# Patient Record
Sex: MALE | Race: WHITE | Employment: OTHER | URBAN - METROPOLITAN AREA
[De-identification: names, ages, dates, MRNs, and addresses within clinical notes are randomized per-mention and may not be internally consistent; named-entity substitution may affect disease eponyms.]

---

## 2019-01-18 ENCOUNTER — HOSPITAL ENCOUNTER (EMERGENCY)
Age: 54
Discharge: HOME | End: 2019-01-18
Attending: EMERGENCY MEDICINE
Payer: MEDICARE

## 2019-01-18 ENCOUNTER — APPOINTMENT (OUTPATIENT)
Dept: CT IMAGING | Age: 54
End: 2019-01-18
Payer: MEDICARE

## 2019-01-18 ENCOUNTER — APPOINTMENT (OUTPATIENT)
Dept: GENERAL RADIOLOGY | Age: 54
End: 2019-01-18
Payer: MEDICARE

## 2019-01-18 DIAGNOSIS — S83.92XA SPRAIN OF LEFT KNEE, UNSPECIFIED LIGAMENT, INITIAL ENCOUNTER: ICD-10-CM

## 2019-01-18 DIAGNOSIS — S00.83XA CONTUSION OF FACE, INITIAL ENCOUNTER: ICD-10-CM

## 2019-01-18 DIAGNOSIS — J01.00 ACUTE MAXILLARY SINUSITIS, RECURRENCE NOT SPECIFIED: ICD-10-CM

## 2019-01-18 DIAGNOSIS — S00.81XA ABRASION OF FACE, INITIAL ENCOUNTER: ICD-10-CM

## 2019-01-18 DIAGNOSIS — S09.90XA CLOSED HEAD INJURY, INITIAL ENCOUNTER: ICD-10-CM
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
## 2019-01-18 PROCEDURE — 70486 CT MAXILLOFACIAL W/O DYE: CPT

## 2019-01-18 PROCEDURE — 70450 CT HEAD/BRAIN W/O DYE: CPT

## 2019-01-18 PROCEDURE — 6370000000 HC RX 637 (ALT 250 FOR IP): Performed by: EMERGENCY MEDICINE

## 2019-01-18 PROCEDURE — 73560 X-RAY EXAM OF KNEE 1 OR 2: CPT

## 2019-01-18 PROCEDURE — 72125 CT NECK SPINE W/O DYE: CPT

## 2019-01-18 PROCEDURE — 99284 EMERGENCY DEPT VISIT MOD MDM: CPT

## 2019-01-18 RX ORDER — ACETAMINOPHEN 325 MG/1
650 TABLET ORAL EVERY 6 HOURS PRN
Qty: 30 TABLET | Refills: 0 | Status: SHIPPED | OUTPATIENT
Start: 2019-01-18 | End: 2022-06-20

## 2019-01-18 RX ORDER — DIAPER,BRIEF,INFANT-TODD,DISP
EACH MISCELLANEOUS ONCE
Status: COMPLETED | OUTPATIENT
Start: 2019-01-18 | End: 2019-01-18

## 2019-01-18 RX ORDER — LEVOFLOXACIN 500 MG/1
500 TABLET, FILM COATED ORAL DAILY
Qty: 10 TABLET | Refills: 0 | Status: SHIPPED | OUTPATIENT
Start: 2019-01-18 | End: 2019-01-28

## 2019-01-18 RX ORDER — GINSENG 100 MG
CAPSULE ORAL
Qty: 1 TUBE | Refills: 3 | Status: SHIPPED | OUTPATIENT
Start: 2019-01-18 | End: 2019-01-28

## 2019-01-18 RX ORDER — ACETAMINOPHEN 500 MG
1000 TABLET ORAL ONCE
Status: COMPLETED | OUTPATIENT
Start: 2019-01-18 | End: 2019-01-18

## 2019-01-18 RX ORDER — LEVOFLOXACIN 500 MG/1
500 TABLET, FILM COATED ORAL DAILY
Status: DISCONTINUED | OUTPATIENT
Start: 2019-01-18 | End: 2019-01-18 | Stop reason: HOSPADM

## 2019-01-18 RX ADMIN — ACETAMINOPHEN 1000 MG: 500 TABLET ORAL at 20:26

## 2019-01-18 RX ADMIN — LEVOFLOXACIN 500 MG: 500 TABLET, FILM COATED ORAL at 20:26

## 2019-01-18 RX ADMIN — BACITRACIN ZINC 1 G: 500 OINTMENT TOPICAL at 20:30

## 2019-01-18 ASSESSMENT — PAIN SCALES - GENERAL
PAINLEVEL_OUTOF10: 4
PAINLEVEL_OUTOF10: 7

## 2019-01-18 ASSESSMENT — PAIN DESCRIPTION - LOCATION: LOCATION: KNEE

## 2019-01-18 ASSESSMENT — PAIN DESCRIPTION - PAIN TYPE: TYPE: ACUTE PAIN

## 2019-01-18 ASSESSMENT — PAIN DESCRIPTION - ORIENTATION: ORIENTATION: LEFT

## 2019-01-19 VITALS
TEMPERATURE: 99 F | SYSTOLIC BLOOD PRESSURE: 132 MMHG | DIASTOLIC BLOOD PRESSURE: 87 MMHG | WEIGHT: 150 LBS | RESPIRATION RATE: 16 BRPM | BODY MASS INDEX: 23.54 KG/M2 | HEART RATE: 92 BPM | HEIGHT: 67 IN | OXYGEN SATURATION: 96 %

## 2019-11-30 ENCOUNTER — APPOINTMENT (OUTPATIENT)
Dept: CT IMAGING | Age: 54
DRG: 392 | End: 2019-11-30
Payer: MEDICARE

## 2019-11-30 ENCOUNTER — HOSPITAL ENCOUNTER (INPATIENT)
Age: 54
LOS: 2 days | Discharge: HOME | DRG: 392 | End: 2019-12-03
Attending: EMERGENCY MEDICINE | Admitting: INTERNAL MEDICINE
Payer: MEDICARE

## 2019-11-30 ENCOUNTER — APPOINTMENT (OUTPATIENT)
Dept: GENERAL RADIOLOGY | Age: 54
DRG: 392 | End: 2019-11-30
Payer: MEDICARE

## 2019-11-30 DIAGNOSIS — R11.2 NAUSEA AND VOMITING, INTRACTABILITY OF VOMITING NOT SPECIFIED, UNSPECIFIED VOMITING TYPE: ICD-10-CM

## 2019-11-30 DIAGNOSIS — K92.0 HEMATEMESIS WITH NAUSEA: Primary | ICD-10-CM

## 2019-11-30 DIAGNOSIS — K20.90 ESOPHAGITIS: ICD-10-CM

## 2019-11-30 DIAGNOSIS — E87.20 LACTIC ACIDOSIS: ICD-10-CM

## 2019-11-30 DIAGNOSIS — R10.84 GENERALIZED ABDOMINAL PAIN: ICD-10-CM

## 2019-11-30 LAB
ABO/RH: NORMAL
ALBUMIN SERPL-MCNC: 3.7 GM/DL (ref 3.4–5)
ALP BLD-CCNC: 104 IU/L (ref 40–128)
ALT SERPL-CCNC: 17 U/L (ref 10–40)
ANION GAP SERPL CALCULATED.3IONS-SCNC: 16 MMOL/L (ref 4–16)
ANTIBODY SCREEN: NEGATIVE
APTT: 22.7 SECONDS (ref 25.1–37.1)
AST SERPL-CCNC: 15 IU/L (ref 15–37)
BACTERIA: NEGATIVE /HPF
BASOPHILS ABSOLUTE: 0 K/CU MM
BASOPHILS RELATIVE PERCENT: 0.1 % (ref 0–1)
BILIRUB SERPL-MCNC: 0.8 MG/DL (ref 0–1)
BILIRUBIN URINE: NEGATIVE MG/DL
BLOOD, URINE: ABNORMAL
BUN BLDV-MCNC: 19 MG/DL (ref 6–23)
CALCIUM SERPL-MCNC: 8.4 MG/DL (ref 8.3–10.6)
CHLORIDE BLD-SCNC: 104 MMOL/L (ref 99–110)
CLARITY: CLEAR
CO2: 18 MMOL/L (ref 21–32)
COLOR: YELLOW
CREAT SERPL-MCNC: 0.8 MG/DL (ref 0.9–1.3)
DIFFERENTIAL TYPE: ABNORMAL
EOSINOPHILS ABSOLUTE: 0.1 K/CU MM
EOSINOPHILS RELATIVE PERCENT: 0.5 % (ref 0–3)
GFR AFRICAN AMERICAN: >60 ML/MIN/1.73M2
GFR NON-AFRICAN AMERICAN: >60 ML/MIN/1.73M2
GLUCOSE BLD-MCNC: 138 MG/DL (ref 70–99)
GLUCOSE, URINE: NEGATIVE MG/DL
HCT VFR BLD CALC: 52.8 % (ref 42–52)
HEMOGLOBIN: 16.9 GM/DL (ref 13.5–18)
IMMATURE NEUTROPHIL %: 0.4 % (ref 0–0.43)
INR BLD: 1.08 INDEX
KETONES, URINE: NEGATIVE MG/DL
LACTATE: 2.9 MMOL/L (ref 0.4–2)
LACTATE: ABNORMAL MMOL/L (ref 0.4–2)
LEUKOCYTE ESTERASE, URINE: NEGATIVE
LIPASE: 30 IU/L (ref 13–60)
LYMPHOCYTES ABSOLUTE: 0.6 K/CU MM
LYMPHOCYTES RELATIVE PERCENT: 4.5 % (ref 24–44)
MAGNESIUM: 1.6 MG/DL (ref 1.8–2.4)
MCH RBC QN AUTO: 30.6 PG (ref 27–31)
MCHC RBC AUTO-ENTMCNC: 32 % (ref 32–36)
MCV RBC AUTO: 95.7 FL (ref 78–100)
MONOCYTES ABSOLUTE: 1.3 K/CU MM
MONOCYTES RELATIVE PERCENT: 9.5 % (ref 0–4)
MUCUS: ABNORMAL HPF
NITRITE URINE, QUANTITATIVE: NEGATIVE
NUCLEATED RBC %: 0 %
PDW BLD-RTO: 12.3 % (ref 11.7–14.9)
PH, URINE: 5 (ref 5–8)
PLATELET # BLD: 235 K/CU MM (ref 140–440)
PMV BLD AUTO: 9.9 FL (ref 7.5–11.1)
POTASSIUM SERPL-SCNC: 3.4 MMOL/L (ref 3.5–5.1)
PRO-BNP: 17.54 PG/ML
PROTEIN UA: NEGATIVE MG/DL
PROTHROMBIN TIME: 12.3 SECONDS (ref 11.7–14.5)
RBC # BLD: 5.52 M/CU MM (ref 4.6–6.2)
RBC URINE: 2 /HPF (ref 0–3)
SEGMENTED NEUTROPHILS ABSOLUTE COUNT: 11.6 K/CU MM
SEGMENTED NEUTROPHILS RELATIVE PERCENT: 85 % (ref 36–66)
SODIUM BLD-SCNC: 138 MMOL/L (ref 135–145)
SPECIFIC GRAVITY UA: 1.03 (ref 1–1.03)
TOTAL IMMATURE NEUTOROPHIL: 0.06 K/CU MM
TOTAL NUCLEATED RBC: 0 K/CU MM
TOTAL PROTEIN: 7 GM/DL (ref 6.4–8.2)
TRICHOMONAS: ABNORMAL /HPF
TROPONIN T: <0.01 NG/ML
UROBILINOGEN, URINE: NORMAL MG/DL (ref 0.2–1)
WBC # BLD: 13.7 K/CU MM (ref 4–10.5)
WBC UA: 2 /HPF (ref 0–2)

## 2019-11-30 PROCEDURE — 96375 TX/PRO/DX INJ NEW DRUG ADDON: CPT

## 2019-11-30 PROCEDURE — 99285 EMERGENCY DEPT VISIT HI MDM: CPT

## 2019-11-30 PROCEDURE — 83880 ASSAY OF NATRIURETIC PEPTIDE: CPT

## 2019-11-30 PROCEDURE — 6360000004 HC RX CONTRAST MEDICATION: Performed by: PHYSICIAN ASSISTANT

## 2019-11-30 PROCEDURE — 74177 CT ABD & PELVIS W/CONTRAST: CPT

## 2019-11-30 PROCEDURE — 80053 COMPREHEN METABOLIC PANEL: CPT

## 2019-11-30 PROCEDURE — 93005 ELECTROCARDIOGRAM TRACING: CPT | Performed by: PHYSICIAN ASSISTANT

## 2019-11-30 PROCEDURE — 2580000003 HC RX 258: Performed by: PHYSICIAN ASSISTANT

## 2019-11-30 PROCEDURE — 71046 X-RAY EXAM CHEST 2 VIEWS: CPT

## 2019-11-30 PROCEDURE — 84484 ASSAY OF TROPONIN QUANT: CPT

## 2019-11-30 PROCEDURE — 86850 RBC ANTIBODY SCREEN: CPT

## 2019-11-30 PROCEDURE — 83605 ASSAY OF LACTIC ACID: CPT

## 2019-11-30 PROCEDURE — 85730 THROMBOPLASTIN TIME PARTIAL: CPT

## 2019-11-30 PROCEDURE — 83690 ASSAY OF LIPASE: CPT

## 2019-11-30 PROCEDURE — 36415 COLL VENOUS BLD VENIPUNCTURE: CPT

## 2019-11-30 PROCEDURE — 96374 THER/PROPH/DIAG INJ IV PUSH: CPT

## 2019-11-30 PROCEDURE — 85025 COMPLETE CBC W/AUTO DIFF WBC: CPT

## 2019-11-30 PROCEDURE — 83735 ASSAY OF MAGNESIUM: CPT

## 2019-11-30 PROCEDURE — 86900 BLOOD TYPING SEROLOGIC ABO: CPT

## 2019-11-30 PROCEDURE — 86901 BLOOD TYPING SEROLOGIC RH(D): CPT

## 2019-11-30 PROCEDURE — 2500000003 HC RX 250 WO HCPCS: Performed by: PHYSICIAN ASSISTANT

## 2019-11-30 PROCEDURE — 85610 PROTHROMBIN TIME: CPT

## 2019-11-30 PROCEDURE — 6360000002 HC RX W HCPCS: Performed by: PHYSICIAN ASSISTANT

## 2019-11-30 PROCEDURE — 81001 URINALYSIS AUTO W/SCOPE: CPT

## 2019-11-30 RX ORDER — 0.9 % SODIUM CHLORIDE 0.9 %
250 INTRAVENOUS SOLUTION INTRAVENOUS ONCE
Status: COMPLETED | OUTPATIENT
Start: 2019-11-30 | End: 2019-11-30

## 2019-11-30 RX ORDER — 0.9 % SODIUM CHLORIDE 0.9 %
1000 INTRAVENOUS SOLUTION INTRAVENOUS ONCE
Status: COMPLETED | OUTPATIENT
Start: 2019-11-30 | End: 2019-12-01

## 2019-11-30 RX ORDER — SODIUM CHLORIDE 0.9 % (FLUSH) 0.9 %
10 SYRINGE (ML) INJECTION 2 TIMES DAILY
Status: DISCONTINUED | OUTPATIENT
Start: 2019-11-30 | End: 2019-12-03 | Stop reason: HOSPADM

## 2019-11-30 RX ORDER — ONDANSETRON 2 MG/ML
4 INJECTION INTRAMUSCULAR; INTRAVENOUS ONCE
Status: COMPLETED | OUTPATIENT
Start: 2019-11-30 | End: 2019-11-30

## 2019-11-30 RX ADMIN — SODIUM CHLORIDE 250 ML: 9 INJECTION, SOLUTION INTRAVENOUS at 18:13

## 2019-11-30 RX ADMIN — FAMOTIDINE 20 MG: 10 INJECTION, SOLUTION INTRAVENOUS at 18:13

## 2019-11-30 RX ADMIN — IOPAMIDOL 80 ML: 755 INJECTION, SOLUTION INTRAVENOUS at 20:52

## 2019-11-30 RX ADMIN — Medication 10 ML: at 20:52

## 2019-11-30 RX ADMIN — ONDANSETRON 4 MG: 2 INJECTION INTRAMUSCULAR; INTRAVENOUS at 18:13

## 2019-11-30 RX ADMIN — SODIUM CHLORIDE 1000 ML: 9 INJECTION, SOLUTION INTRAVENOUS at 22:40

## 2019-11-30 ASSESSMENT — PAIN SCALES - GENERAL: PAINLEVEL_OUTOF10: 10

## 2019-11-30 ASSESSMENT — PAIN DESCRIPTION - PAIN TYPE: TYPE: ACUTE PAIN

## 2019-11-30 ASSESSMENT — PAIN DESCRIPTION - LOCATION: LOCATION: ABDOMEN

## 2019-12-01 PROBLEM — K92.2 ACUTE UPPER GI BLEED: Status: ACTIVE | Noted: 2019-12-01

## 2019-12-01 LAB
HCT VFR BLD CALC: 43.5 % (ref 42–52)
HEMOCCULT SP1 STL QL: POSITIVE
HEMOGLOBIN: 14.4 GM/DL (ref 13.5–18)

## 2019-12-01 PROCEDURE — 82705 FATS/LIPIDS FECES QUAL: CPT

## 2019-12-01 PROCEDURE — 87507 IADNA-DNA/RNA PROBE TQ 12-25: CPT

## 2019-12-01 PROCEDURE — 85014 HEMATOCRIT: CPT

## 2019-12-01 PROCEDURE — 87046 STOOL CULTR AEROBIC BACT EA: CPT

## 2019-12-01 PROCEDURE — 87045 FECES CULTURE AEROBIC BACT: CPT

## 2019-12-01 PROCEDURE — 87425 ROTAVIRUS AG IA: CPT

## 2019-12-01 PROCEDURE — 85018 HEMOGLOBIN: CPT

## 2019-12-01 PROCEDURE — 6360000002 HC RX W HCPCS: Performed by: INTERNAL MEDICINE

## 2019-12-01 PROCEDURE — C9113 INJ PANTOPRAZOLE SODIUM, VIA: HCPCS | Performed by: INTERNAL MEDICINE

## 2019-12-01 PROCEDURE — 36415 COLL VENOUS BLD VENIPUNCTURE: CPT

## 2019-12-01 PROCEDURE — 87329 GIARDIA AG IA: CPT

## 2019-12-01 PROCEDURE — 1200000000 HC SEMI PRIVATE

## 2019-12-01 PROCEDURE — G0328 FECAL BLOOD SCRN IMMUNOASSAY: HCPCS

## 2019-12-01 PROCEDURE — 93010 ELECTROCARDIOGRAM REPORT: CPT | Performed by: INTERNAL MEDICINE

## 2019-12-01 PROCEDURE — 2580000003 HC RX 258: Performed by: INTERNAL MEDICINE

## 2019-12-01 RX ORDER — SODIUM CHLORIDE 9 MG/ML
INJECTION, SOLUTION INTRAVENOUS CONTINUOUS
Status: DISCONTINUED | OUTPATIENT
Start: 2019-12-01 | End: 2019-12-01

## 2019-12-01 RX ORDER — MAGNESIUM SULFATE 4 G/50ML
4 INJECTION INTRAVENOUS ONCE
Status: COMPLETED | OUTPATIENT
Start: 2019-12-01 | End: 2019-12-01

## 2019-12-01 RX ORDER — PANTOPRAZOLE SODIUM 40 MG/10ML
40 INJECTION, POWDER, LYOPHILIZED, FOR SOLUTION INTRAVENOUS EVERY 12 HOURS
Status: DISCONTINUED | OUTPATIENT
Start: 2019-12-01 | End: 2019-12-03

## 2019-12-01 RX ORDER — SODIUM CHLORIDE 0.9 % (FLUSH) 0.9 %
10 SYRINGE (ML) INJECTION PRN
Status: DISCONTINUED | OUTPATIENT
Start: 2019-12-01 | End: 2019-12-03 | Stop reason: HOSPADM

## 2019-12-01 RX ORDER — LEVOTHYROXINE SODIUM 0.03 MG/1
25 TABLET ORAL DAILY
COMMUNITY

## 2019-12-01 RX ORDER — AMLODIPINE BESYLATE 10 MG/1
10 TABLET ORAL EVERY MORNING
Status: DISCONTINUED | OUTPATIENT
Start: 2019-12-02 | End: 2019-12-03 | Stop reason: HOSPADM

## 2019-12-01 RX ORDER — PANTOPRAZOLE SODIUM 40 MG/10ML
40 INJECTION, POWDER, LYOPHILIZED, FOR SOLUTION INTRAVENOUS ONCE
Status: DISCONTINUED | OUTPATIENT
Start: 2019-12-01 | End: 2019-12-01

## 2019-12-01 RX ORDER — PAROXETINE HYDROCHLORIDE 20 MG/1
40 TABLET, FILM COATED ORAL EVERY MORNING
Status: DISCONTINUED | OUTPATIENT
Start: 2019-12-02 | End: 2019-12-03 | Stop reason: HOSPADM

## 2019-12-01 RX ORDER — SODIUM CHLORIDE AND POTASSIUM CHLORIDE .9; .15 G/100ML; G/100ML
SOLUTION INTRAVENOUS CONTINUOUS
Status: DISCONTINUED | OUTPATIENT
Start: 2019-12-01 | End: 2019-12-03 | Stop reason: HOSPADM

## 2019-12-01 RX ORDER — ACETAMINOPHEN 325 MG/1
650 TABLET ORAL EVERY 4 HOURS PRN
Status: DISCONTINUED | OUTPATIENT
Start: 2019-12-01 | End: 2019-12-03 | Stop reason: HOSPADM

## 2019-12-01 RX ORDER — SODIUM CHLORIDE 9 MG/ML
10 INJECTION INTRAVENOUS EVERY 12 HOURS
Status: DISCONTINUED | OUTPATIENT
Start: 2019-12-01 | End: 2019-12-03 | Stop reason: HOSPADM

## 2019-12-01 RX ORDER — SODIUM CHLORIDE 0.9 % (FLUSH) 0.9 %
10 SYRINGE (ML) INJECTION EVERY 12 HOURS SCHEDULED
Status: DISCONTINUED | OUTPATIENT
Start: 2019-12-01 | End: 2019-12-03 | Stop reason: HOSPADM

## 2019-12-01 RX ORDER — ASPIRIN 81 MG/1
81 TABLET, CHEWABLE ORAL DAILY
Status: DISCONTINUED | OUTPATIENT
Start: 2019-12-01 | End: 2019-12-03 | Stop reason: HOSPADM

## 2019-12-01 RX ORDER — ONDANSETRON 2 MG/ML
4 INJECTION INTRAMUSCULAR; INTRAVENOUS EVERY 6 HOURS PRN
Status: DISCONTINUED | OUTPATIENT
Start: 2019-12-01 | End: 2019-12-03 | Stop reason: HOSPADM

## 2019-12-01 RX ADMIN — PANTOPRAZOLE SODIUM 40 MG: 40 INJECTION, POWDER, FOR SOLUTION INTRAVENOUS at 09:07

## 2019-12-01 RX ADMIN — MAGNESIUM SULFATE HEPTAHYDRATE 4 G: 80 INJECTION, SOLUTION INTRAVENOUS at 09:09

## 2019-12-01 RX ADMIN — POTASSIUM CHLORIDE AND SODIUM CHLORIDE: 900; 150 INJECTION, SOLUTION INTRAVENOUS at 09:19

## 2019-12-01 RX ADMIN — PANTOPRAZOLE SODIUM 40 MG: 40 INJECTION, POWDER, FOR SOLUTION INTRAVENOUS at 20:22

## 2019-12-01 RX ADMIN — SODIUM CHLORIDE: 9 INJECTION, SOLUTION INTRAVENOUS at 06:05

## 2019-12-01 RX ADMIN — Medication 10 ML: at 09:10

## 2019-12-01 RX ADMIN — SODIUM CHLORIDE: 9 INJECTION, SOLUTION INTRAVENOUS at 01:24

## 2019-12-01 RX ADMIN — Medication 10 ML: at 13:24

## 2019-12-01 RX ADMIN — LEVETIRACETAM 500 MG: 100 INJECTION, SOLUTION, CONCENTRATE INTRAVENOUS at 20:22

## 2019-12-01 RX ADMIN — ENOXAPARIN SODIUM 30 MG: 30 INJECTION SUBCUTANEOUS at 13:40

## 2019-12-01 RX ADMIN — Medication 10 ML: at 20:22

## 2019-12-01 RX ADMIN — LEVETIRACETAM 500 MG: 100 INJECTION, SOLUTION, CONCENTRATE INTRAVENOUS at 13:25

## 2019-12-01 RX ADMIN — ONDANSETRON 4 MG: 2 INJECTION INTRAMUSCULAR; INTRAVENOUS at 06:06

## 2019-12-01 ASSESSMENT — PAIN SCALES - GENERAL
PAINLEVEL_OUTOF10: 5
PAINLEVEL_OUTOF10: 0
PAINLEVEL_OUTOF10: 5
PAINLEVEL_OUTOF10: 5
PAINLEVEL_OUTOF10: 2
PAINLEVEL_OUTOF10: 5
PAINLEVEL_OUTOF10: 5
PAINLEVEL_OUTOF10: 0
PAINLEVEL_OUTOF10: 5
PAINLEVEL_OUTOF10: 5
PAINLEVEL_OUTOF10: 0

## 2019-12-01 ASSESSMENT — PAIN SCALES - WONG BAKER: WONGBAKER_NUMERICALRESPONSE: 0

## 2019-12-01 ASSESSMENT — PAIN DESCRIPTION - LOCATION
LOCATION: ABDOMEN

## 2019-12-01 ASSESSMENT — PAIN DESCRIPTION - FREQUENCY: FREQUENCY: CONTINUOUS

## 2019-12-01 ASSESSMENT — PAIN DESCRIPTION - PAIN TYPE
TYPE: ACUTE PAIN

## 2019-12-01 ASSESSMENT — PAIN DESCRIPTION - DESCRIPTORS: DESCRIPTORS: CRAMPING;DULL

## 2019-12-02 ENCOUNTER — ANESTHESIA EVENT (OUTPATIENT)
Dept: ENDOSCOPY | Age: 54
DRG: 392 | End: 2019-12-02
Payer: MEDICARE

## 2019-12-02 LAB
AMYLASE: 122 U/L (ref 25–115)
ANION GAP SERPL CALCULATED.3IONS-SCNC: 7 MMOL/L (ref 4–16)
APTT: 30.3 SECONDS (ref 25.1–37.1)
BASOPHILS ABSOLUTE: 0 K/CU MM
BASOPHILS RELATIVE PERCENT: 0.3 % (ref 0–1)
BUN BLDV-MCNC: 7 MG/DL (ref 6–23)
CALCIUM SERPL-MCNC: 8.1 MG/DL (ref 8.3–10.6)
CHLORIDE BLD-SCNC: 107 MMOL/L (ref 99–110)
CO2: 28 MMOL/L (ref 21–32)
CREAT SERPL-MCNC: 0.9 MG/DL (ref 0.9–1.3)
DIFFERENTIAL TYPE: ABNORMAL
EOSINOPHILS ABSOLUTE: 0.5 K/CU MM
EOSINOPHILS RELATIVE PERCENT: 5.5 % (ref 0–3)
GFR AFRICAN AMERICAN: >60 ML/MIN/1.73M2
GFR NON-AFRICAN AMERICAN: >60 ML/MIN/1.73M2
GIARDIA ANTIGEN STOOL: NEGATIVE
GLUCOSE BLD-MCNC: 97 MG/DL (ref 70–99)
HCT VFR BLD CALC: 43.3 % (ref 42–52)
HEMOGLOBIN: 14.3 GM/DL (ref 13.5–18)
IMMATURE NEUTROPHIL %: 0.3 % (ref 0–0.43)
INR BLD: 1.13 INDEX
LACTATE: 0.9 MMOL/L (ref 0.4–2)
LIPASE: 78 IU/L (ref 13–60)
LYMPHOCYTES ABSOLUTE: 1.7 K/CU MM
LYMPHOCYTES RELATIVE PERCENT: 19.3 % (ref 24–44)
MAGNESIUM: 2.2 MG/DL (ref 1.8–2.4)
MCH RBC QN AUTO: 30.9 PG (ref 27–31)
MCHC RBC AUTO-ENTMCNC: 33 % (ref 32–36)
MCV RBC AUTO: 93.5 FL (ref 78–100)
MONOCYTES ABSOLUTE: 1.6 K/CU MM
MONOCYTES RELATIVE PERCENT: 17.6 % (ref 0–4)
NUCLEATED RBC %: 0 %
PDW BLD-RTO: 12.4 % (ref 11.7–14.9)
PLATELET # BLD: 219 K/CU MM (ref 140–440)
PMV BLD AUTO: 10.1 FL (ref 7.5–11.1)
POTASSIUM SERPL-SCNC: 3.9 MMOL/L (ref 3.5–5.1)
PROTHROMBIN TIME: 12.9 SECONDS (ref 11.7–14.5)
RBC # BLD: 4.63 M/CU MM (ref 4.6–6.2)
ROTAVIRUS ANTIGEN: NEGATIVE
SEGMENTED NEUTROPHILS ABSOLUTE COUNT: 5 K/CU MM
SEGMENTED NEUTROPHILS RELATIVE PERCENT: 57 % (ref 36–66)
SODIUM BLD-SCNC: 142 MMOL/L (ref 135–145)
TOTAL IMMATURE NEUTOROPHIL: 0.03 K/CU MM
TOTAL NUCLEATED RBC: 0 K/CU MM
WBC # BLD: 8.8 K/CU MM (ref 4–10.5)

## 2019-12-02 PROCEDURE — 82150 ASSAY OF AMYLASE: CPT

## 2019-12-02 PROCEDURE — 83735 ASSAY OF MAGNESIUM: CPT

## 2019-12-02 PROCEDURE — 83690 ASSAY OF LIPASE: CPT

## 2019-12-02 PROCEDURE — 83605 ASSAY OF LACTIC ACID: CPT

## 2019-12-02 PROCEDURE — 6360000002 HC RX W HCPCS: Performed by: INTERNAL MEDICINE

## 2019-12-02 PROCEDURE — 80048 BASIC METABOLIC PNL TOTAL CA: CPT

## 2019-12-02 PROCEDURE — C9113 INJ PANTOPRAZOLE SODIUM, VIA: HCPCS | Performed by: INTERNAL MEDICINE

## 2019-12-02 PROCEDURE — 85025 COMPLETE CBC W/AUTO DIFF WBC: CPT

## 2019-12-02 PROCEDURE — 1200000000 HC SEMI PRIVATE

## 2019-12-02 PROCEDURE — 85730 THROMBOPLASTIN TIME PARTIAL: CPT

## 2019-12-02 PROCEDURE — 85610 PROTHROMBIN TIME: CPT

## 2019-12-02 PROCEDURE — 2580000003 HC RX 258: Performed by: EMERGENCY MEDICINE

## 2019-12-02 PROCEDURE — 2580000003 HC RX 258: Performed by: INTERNAL MEDICINE

## 2019-12-02 PROCEDURE — 36415 COLL VENOUS BLD VENIPUNCTURE: CPT

## 2019-12-02 PROCEDURE — 6370000000 HC RX 637 (ALT 250 FOR IP): Performed by: INTERNAL MEDICINE

## 2019-12-02 RX ADMIN — AMLODIPINE BESYLATE 10 MG: 10 TABLET ORAL at 11:28

## 2019-12-02 RX ADMIN — PAROXETINE HYDROCHLORIDE 40 MG: 20 TABLET, FILM COATED ORAL at 11:29

## 2019-12-02 RX ADMIN — LEVETIRACETAM 500 MG: 100 INJECTION, SOLUTION, CONCENTRATE INTRAVENOUS at 21:23

## 2019-12-02 RX ADMIN — PANTOPRAZOLE SODIUM 40 MG: 40 INJECTION, POWDER, FOR SOLUTION INTRAVENOUS at 21:22

## 2019-12-02 RX ADMIN — POTASSIUM CHLORIDE AND SODIUM CHLORIDE: 900; 150 INJECTION, SOLUTION INTRAVENOUS at 16:29

## 2019-12-02 RX ADMIN — Medication 10 ML: at 21:22

## 2019-12-02 RX ADMIN — Medication 10 ML: at 21:23

## 2019-12-02 RX ADMIN — Medication 10 ML: at 09:41

## 2019-12-02 RX ADMIN — POTASSIUM CHLORIDE AND SODIUM CHLORIDE: 900; 150 INJECTION, SOLUTION INTRAVENOUS at 05:31

## 2019-12-02 RX ADMIN — PANTOPRAZOLE SODIUM 40 MG: 40 INJECTION, POWDER, FOR SOLUTION INTRAVENOUS at 09:41

## 2019-12-02 RX ADMIN — LEVETIRACETAM 500 MG: 100 INJECTION, SOLUTION, CONCENTRATE INTRAVENOUS at 09:41

## 2019-12-02 ASSESSMENT — PAIN DESCRIPTION - LOCATION: LOCATION: ABDOMEN

## 2019-12-02 ASSESSMENT — PAIN - FUNCTIONAL ASSESSMENT: PAIN_FUNCTIONAL_ASSESSMENT: ACTIVITIES ARE NOT PREVENTED

## 2019-12-02 ASSESSMENT — PAIN DESCRIPTION - PROGRESSION: CLINICAL_PROGRESSION: GRADUALLY IMPROVING

## 2019-12-02 ASSESSMENT — PAIN SCALES - GENERAL: PAINLEVEL_OUTOF10: 4

## 2019-12-02 ASSESSMENT — PAIN DESCRIPTION - DESCRIPTORS: DESCRIPTORS: CRAMPING;DULL

## 2019-12-02 ASSESSMENT — PAIN DESCRIPTION - PAIN TYPE: TYPE: ACUTE PAIN

## 2019-12-02 ASSESSMENT — PAIN DESCRIPTION - ONSET: ONSET: ON-GOING

## 2019-12-02 ASSESSMENT — PAIN DESCRIPTION - FREQUENCY: FREQUENCY: CONTINUOUS

## 2019-12-02 ASSESSMENT — PAIN DESCRIPTION - ORIENTATION: ORIENTATION: LEFT

## 2019-12-03 ENCOUNTER — ANESTHESIA (OUTPATIENT)
Dept: ENDOSCOPY | Age: 54
DRG: 392 | End: 2019-12-03
Payer: MEDICARE

## 2019-12-03 VITALS — DIASTOLIC BLOOD PRESSURE: 71 MMHG | SYSTOLIC BLOOD PRESSURE: 101 MMHG | OXYGEN SATURATION: 93 %

## 2019-12-03 VITALS
OXYGEN SATURATION: 94 % | SYSTOLIC BLOOD PRESSURE: 136 MMHG | HEIGHT: 68 IN | RESPIRATION RATE: 17 BRPM | HEART RATE: 80 BPM | WEIGHT: 160 LBS | BODY MASS INDEX: 24.25 KG/M2 | TEMPERATURE: 96.2 F | DIASTOLIC BLOOD PRESSURE: 94 MMHG

## 2019-12-03 LAB
ADENOVIRUS F 40 41 PCR: NOT DETECTED
ASTROVIRUS PCR: NOT DETECTED
CAMPYLOBACTER PCR: NOT DETECTED
CRYPTOSPORIDIUM PCR: NOT DETECTED
CYCLOSPORA CAYETANENSIS PCR: NOT DETECTED
E COLI 0157 PCR: NOT DETECTED
E COLI ENTEROAGGREGATIVE PCR: NOT DETECTED
E COLI ENTEROPATHOGENIC PCR: NOT DETECTED
E COLI ENTEROTOXIGENIC PCR: NOT DETECTED
E COLI SHIGA LIKE TOXIN PCR: NOT DETECTED
E COLI SHIGELLA/ENTEROINVASIVE PCR: NOT DETECTED
ENTAMOEBA HISTOLYTICA PCR: NOT DETECTED
GIARDIA LAMBLIA PCR: NOT DETECTED
MAGNESIUM: 1.9 MG/DL (ref 1.8–2.4)
NOROVIRUS GI GII PCR: ABNORMAL
PLESIOMONAS SHIGELLOIDES PCR: NOT DETECTED
ROTAVIRUS A PCR: NOT DETECTED
SALMONELLA PCR: NOT DETECTED
SAPOVIRUS PCR: NOT DETECTED
VIBRIO CHOLERAE PCR: NOT DETECTED
VIBRIO PCR: NOT DETECTED
YERSINIA ENTEROCOLITICA PCR: NOT DETECTED

## 2019-12-03 PROCEDURE — 88305 TISSUE EXAM BY PATHOLOGIST: CPT

## 2019-12-03 PROCEDURE — 6360000002 HC RX W HCPCS: Performed by: INTERNAL MEDICINE

## 2019-12-03 PROCEDURE — 0DB68ZX EXCISION OF STOMACH, VIA NATURAL OR ARTIFICIAL OPENING ENDOSCOPIC, DIAGNOSTIC: ICD-10-PCS | Performed by: INTERNAL MEDICINE

## 2019-12-03 PROCEDURE — 6370000000 HC RX 637 (ALT 250 FOR IP): Performed by: INTERNAL MEDICINE

## 2019-12-03 PROCEDURE — 88342 IMHCHEM/IMCYTCHM 1ST ANTB: CPT

## 2019-12-03 PROCEDURE — 6360000002 HC RX W HCPCS: Performed by: NURSE ANESTHETIST, CERTIFIED REGISTERED

## 2019-12-03 PROCEDURE — 3609012400 HC EGD TRANSORAL BIOPSY SINGLE/MULTIPLE: Performed by: SPECIALIST

## 2019-12-03 PROCEDURE — 83735 ASSAY OF MAGNESIUM: CPT

## 2019-12-03 PROCEDURE — 2580000003 HC RX 258: Performed by: ANESTHESIOLOGY

## 2019-12-03 PROCEDURE — 2709999900 HC NON-CHARGEABLE SUPPLY: Performed by: SPECIALIST

## 2019-12-03 PROCEDURE — 0DB68ZZ EXCISION OF STOMACH, VIA NATURAL OR ARTIFICIAL OPENING ENDOSCOPIC: ICD-10-PCS | Performed by: INTERNAL MEDICINE

## 2019-12-03 PROCEDURE — 3700000000 HC ANESTHESIA ATTENDED CARE: Performed by: SPECIALIST

## 2019-12-03 PROCEDURE — 3700000001 HC ADD 15 MINUTES (ANESTHESIA): Performed by: SPECIALIST

## 2019-12-03 PROCEDURE — 2500000003 HC RX 250 WO HCPCS: Performed by: NURSE ANESTHETIST, CERTIFIED REGISTERED

## 2019-12-03 PROCEDURE — 83605 ASSAY OF LACTIC ACID: CPT

## 2019-12-03 PROCEDURE — 0DB58ZX EXCISION OF ESOPHAGUS, VIA NATURAL OR ARTIFICIAL OPENING ENDOSCOPIC, DIAGNOSTIC: ICD-10-PCS | Performed by: INTERNAL MEDICINE

## 2019-12-03 PROCEDURE — 36415 COLL VENOUS BLD VENIPUNCTURE: CPT

## 2019-12-03 RX ORDER — LIDOCAINE HYDROCHLORIDE 20 MG/ML
INJECTION, SOLUTION EPIDURAL; INFILTRATION; INTRACAUDAL; PERINEURAL PRN
Status: DISCONTINUED | OUTPATIENT
Start: 2019-12-03 | End: 2019-12-03 | Stop reason: SDUPTHER

## 2019-12-03 RX ORDER — PANTOPRAZOLE SODIUM 40 MG/1
40 TABLET, DELAYED RELEASE ORAL
Status: DISCONTINUED | OUTPATIENT
Start: 2019-12-03 | End: 2019-12-03 | Stop reason: HOSPADM

## 2019-12-03 RX ORDER — LEVETIRACETAM 500 MG/1
500 TABLET ORAL 2 TIMES DAILY
Status: DISCONTINUED | OUTPATIENT
Start: 2019-12-03 | End: 2019-12-03 | Stop reason: HOSPADM

## 2019-12-03 RX ORDER — SODIUM CHLORIDE, SODIUM LACTATE, POTASSIUM CHLORIDE, CALCIUM CHLORIDE 600; 310; 30; 20 MG/100ML; MG/100ML; MG/100ML; MG/100ML
INJECTION, SOLUTION INTRAVENOUS
Status: COMPLETED
Start: 2019-12-03 | End: 2019-12-03

## 2019-12-03 RX ORDER — SODIUM CHLORIDE, SODIUM LACTATE, POTASSIUM CHLORIDE, CALCIUM CHLORIDE 600; 310; 30; 20 MG/100ML; MG/100ML; MG/100ML; MG/100ML
INJECTION, SOLUTION INTRAVENOUS ONCE
Status: COMPLETED | OUTPATIENT
Start: 2019-12-03 | End: 2019-12-03

## 2019-12-03 RX ORDER — PROPOFOL 10 MG/ML
INJECTION, EMULSION INTRAVENOUS PRN
Status: DISCONTINUED | OUTPATIENT
Start: 2019-12-03 | End: 2019-12-03 | Stop reason: SDUPTHER

## 2019-12-03 RX ORDER — PANTOPRAZOLE SODIUM 40 MG/1
40 TABLET, DELAYED RELEASE ORAL
Qty: 60 TABLET | Refills: 0 | Status: ON HOLD | OUTPATIENT
Start: 2019-12-03 | End: 2022-04-21

## 2019-12-03 RX ADMIN — LIDOCAINE HYDROCHLORIDE 50 MG: 20 INJECTION, SOLUTION EPIDURAL; INFILTRATION; INTRACAUDAL; PERINEURAL at 08:30

## 2019-12-03 RX ADMIN — LEVETIRACETAM 500 MG: 500 TABLET, FILM COATED ORAL at 12:53

## 2019-12-03 RX ADMIN — SODIUM CHLORIDE, POTASSIUM CHLORIDE, SODIUM LACTATE AND CALCIUM CHLORIDE: 600; 310; 30; 20 INJECTION, SOLUTION INTRAVENOUS at 08:26

## 2019-12-03 RX ADMIN — ENOXAPARIN SODIUM 30 MG: 30 INJECTION SUBCUTANEOUS at 12:53

## 2019-12-03 RX ADMIN — PROPOFOL 30 MG: 10 INJECTION, EMULSION INTRAVENOUS at 08:31

## 2019-12-03 RX ADMIN — PROPOFOL 30 MG: 10 INJECTION, EMULSION INTRAVENOUS at 08:39

## 2019-12-03 RX ADMIN — POTASSIUM CHLORIDE AND SODIUM CHLORIDE: 900; 150 INJECTION, SOLUTION INTRAVENOUS at 03:39

## 2019-12-03 RX ADMIN — PAROXETINE HYDROCHLORIDE 40 MG: 20 TABLET, FILM COATED ORAL at 09:15

## 2019-12-03 RX ADMIN — ASPIRIN 81 MG 81 MG: 81 TABLET ORAL at 09:15

## 2019-12-03 RX ADMIN — LIDOCAINE HYDROCHLORIDE 50 MG: 20 INJECTION, SOLUTION EPIDURAL; INFILTRATION; INTRACAUDAL; PERINEURAL at 08:31

## 2019-12-03 RX ADMIN — PROPOFOL 30 MG: 10 INJECTION, EMULSION INTRAVENOUS at 08:33

## 2019-12-03 RX ADMIN — PROPOFOL 30 MG: 10 INJECTION, EMULSION INTRAVENOUS at 08:36

## 2019-12-03 RX ADMIN — AMLODIPINE BESYLATE 10 MG: 10 TABLET ORAL at 09:15

## 2019-12-03 RX ADMIN — PROPOFOL 50 MG: 10 INJECTION, EMULSION INTRAVENOUS at 08:30

## 2019-12-04 LAB
CULTURE: NORMAL
EKG ATRIAL RATE: 118 BPM
EKG DIAGNOSIS: NORMAL
EKG P AXIS: 20 DEGREES
EKG P-R INTERVAL: 142 MS
EKG Q-T INTERVAL: 324 MS
EKG QRS DURATION: 76 MS
EKG QTC CALCULATION (BAZETT): 454 MS
EKG R AXIS: 20 DEGREES
EKG T AXIS: 22 DEGREES
EKG VENTRICULAR RATE: 118 BPM
FAT QUALITATIVE NEUTRAL STOOL: NORMAL
FAT QUALITATIVE SPLIT STOOL: NORMAL
FAT QUALITATIVE SPLIT STOOL: NORMAL
Lab: NORMAL
SPECIMEN: NORMAL

## 2020-01-16 PROBLEM — K63.5 CECAL POLYP: Status: ACTIVE | Noted: 2020-01-16

## 2020-01-16 PROBLEM — L72.3 SEBACEOUS CYST: Status: ACTIVE | Noted: 2020-01-16

## 2020-08-03 ENCOUNTER — HOSPITAL ENCOUNTER (EMERGENCY)
Age: 55
Discharge: HOME | End: 2020-08-04
Attending: EMERGENCY MEDICINE
Payer: MEDICARE

## 2020-08-03 ENCOUNTER — APPOINTMENT (OUTPATIENT)
Dept: GENERAL RADIOLOGY | Age: 55
End: 2020-08-03
Payer: MEDICARE

## 2020-08-03 VITALS
RESPIRATION RATE: 15 BRPM | DIASTOLIC BLOOD PRESSURE: 95 MMHG | TEMPERATURE: 98.6 F | SYSTOLIC BLOOD PRESSURE: 139 MMHG | BODY MASS INDEX: 26.37 KG/M2 | HEIGHT: 67 IN | WEIGHT: 168 LBS | HEART RATE: 100 BPM | OXYGEN SATURATION: 94 %

## 2020-08-03 LAB
ALBUMIN SERPL-MCNC: 4.4 GM/DL (ref 3.4–5)
ALP BLD-CCNC: 100 IU/L (ref 40–129)
ALT SERPL-CCNC: 49 U/L (ref 10–40)
ANION GAP SERPL CALCULATED.3IONS-SCNC: 12 MMOL/L (ref 4–16)
AST SERPL-CCNC: 27 IU/L (ref 15–37)
BACTERIA: NEGATIVE /HPF
BASOPHILS ABSOLUTE: 0.1 K/CU MM
BASOPHILS RELATIVE PERCENT: 0.8 % (ref 0–1)
BILIRUB SERPL-MCNC: 0.4 MG/DL (ref 0–1)
BILIRUBIN URINE: NEGATIVE MG/DL
BLOOD, URINE: ABNORMAL
BUN BLDV-MCNC: 15 MG/DL (ref 6–23)
CALCIUM SERPL-MCNC: 9.3 MG/DL (ref 8.3–10.6)
CHLORIDE BLD-SCNC: 101 MMOL/L (ref 99–110)
CLARITY: CLEAR
CO2: 24 MMOL/L (ref 21–32)
COLOR: ABNORMAL
CREAT SERPL-MCNC: 1 MG/DL (ref 0.9–1.3)
DIFFERENTIAL TYPE: ABNORMAL
EOSINOPHILS ABSOLUTE: 0.8 K/CU MM
EOSINOPHILS RELATIVE PERCENT: 9 % (ref 0–3)
GFR AFRICAN AMERICAN: >60 ML/MIN/1.73M2
GFR NON-AFRICAN AMERICAN: >60 ML/MIN/1.73M2
GLUCOSE BLD-MCNC: 128 MG/DL (ref 70–99)
GLUCOSE, URINE: NEGATIVE MG/DL
HCT VFR BLD CALC: 47.1 % (ref 42–52)
HEMOGLOBIN: 16.4 GM/DL (ref 13.5–18)
IMMATURE NEUTROPHIL %: 0.2 % (ref 0–0.43)
KETONES, URINE: NEGATIVE MG/DL
LEUKOCYTE ESTERASE, URINE: NEGATIVE
LIPASE: 90 IU/L (ref 13–60)
LYMPHOCYTES ABSOLUTE: 2.3 K/CU MM
LYMPHOCYTES RELATIVE PERCENT: 25.1 % (ref 24–44)
MCH RBC QN AUTO: 31.7 PG (ref 27–31)
MCHC RBC AUTO-ENTMCNC: 34.8 % (ref 32–36)
MCV RBC AUTO: 90.9 FL (ref 78–100)
MONOCYTES ABSOLUTE: 1.1 K/CU MM
MONOCYTES RELATIVE PERCENT: 11.9 % (ref 0–4)
MUCUS: ABNORMAL HPF
NITRITE URINE, QUANTITATIVE: NEGATIVE
NUCLEATED RBC %: 0 %
PDW BLD-RTO: 11.8 % (ref 11.7–14.9)
PH, URINE: 5 (ref 5–8)
PLATELET # BLD: 226 K/CU MM (ref 140–440)
PMV BLD AUTO: 9.7 FL (ref 7.5–11.1)
POTASSIUM SERPL-SCNC: 3.7 MMOL/L (ref 3.5–5.1)
PROTEIN UA: NEGATIVE MG/DL
RBC # BLD: 5.18 M/CU MM (ref 4.6–6.2)
RBC URINE: ABNORMAL /HPF (ref 0–3)
SEGMENTED NEUTROPHILS ABSOLUTE COUNT: 4.8 K/CU MM
SEGMENTED NEUTROPHILS RELATIVE PERCENT: 53 % (ref 36–66)
SODIUM BLD-SCNC: 137 MMOL/L (ref 135–145)
SPECIFIC GRAVITY UA: 1.01 (ref 1–1.03)
TOTAL IMMATURE NEUTOROPHIL: 0.02 K/CU MM
TOTAL NUCLEATED RBC: 0 K/CU MM
TOTAL PROTEIN: 7.5 GM/DL (ref 6.4–8.2)
TRICHOMONAS: ABNORMAL /HPF
TROPONIN T: <0.01 NG/ML
UROBILINOGEN, URINE: NORMAL MG/DL (ref 0.2–1)
WBC # BLD: 9.1 K/CU MM (ref 4–10.5)
WBC UA: ABNORMAL /HPF (ref 0–2)

## 2020-08-03 PROCEDURE — 2580000003 HC RX 258: Performed by: EMERGENCY MEDICINE

## 2020-08-03 PROCEDURE — 81001 URINALYSIS AUTO W/SCOPE: CPT

## 2020-08-03 PROCEDURE — 83690 ASSAY OF LIPASE: CPT

## 2020-08-03 PROCEDURE — 99284 EMERGENCY DEPT VISIT MOD MDM: CPT

## 2020-08-03 PROCEDURE — 71045 X-RAY EXAM CHEST 1 VIEW: CPT

## 2020-08-03 PROCEDURE — 84484 ASSAY OF TROPONIN QUANT: CPT

## 2020-08-03 PROCEDURE — 80053 COMPREHEN METABOLIC PANEL: CPT

## 2020-08-03 PROCEDURE — 85025 COMPLETE CBC W/AUTO DIFF WBC: CPT

## 2020-08-03 PROCEDURE — 93005 ELECTROCARDIOGRAM TRACING: CPT | Performed by: EMERGENCY MEDICINE

## 2020-08-03 PROCEDURE — 6370000000 HC RX 637 (ALT 250 FOR IP): Performed by: EMERGENCY MEDICINE

## 2020-08-03 RX ORDER — DICYCLOMINE HCL 20 MG
20 TABLET ORAL ONCE
Status: COMPLETED | OUTPATIENT
Start: 2020-08-03 | End: 2020-08-03

## 2020-08-03 RX ORDER — SODIUM CHLORIDE, SODIUM LACTATE, POTASSIUM CHLORIDE, CALCIUM CHLORIDE 600; 310; 30; 20 MG/100ML; MG/100ML; MG/100ML; MG/100ML
1000 INJECTION, SOLUTION INTRAVENOUS ONCE
Status: COMPLETED | OUTPATIENT
Start: 2020-08-03 | End: 2020-08-04

## 2020-08-03 RX ADMIN — DICYCLOMINE HYDROCHLORIDE 20 MG: 20 TABLET ORAL at 23:06

## 2020-08-03 RX ADMIN — SODIUM CHLORIDE, POTASSIUM CHLORIDE, SODIUM LACTATE AND CALCIUM CHLORIDE 1000 ML: 600; 310; 30; 20 INJECTION, SOLUTION INTRAVENOUS at 23:30

## 2020-08-03 ASSESSMENT — ENCOUNTER SYMPTOMS
CONSTIPATION: 0
SORE THROAT: 0
NAUSEA: 0
ABDOMINAL PAIN: 1
VOMITING: 0
COUGH: 0
DIARRHEA: 1
SHORTNESS OF BREATH: 0
BACK PAIN: 0
RHINORRHEA: 0
EYE REDNESS: 0

## 2020-08-03 ASSESSMENT — PAIN SCALES - GENERAL: PAINLEVEL_OUTOF10: 9

## 2020-08-04 ENCOUNTER — APPOINTMENT (OUTPATIENT)
Dept: CT IMAGING | Age: 55
End: 2020-08-04
Payer: MEDICARE

## 2020-08-04 PROCEDURE — 93010 ELECTROCARDIOGRAM REPORT: CPT | Performed by: INTERNAL MEDICINE

## 2020-08-04 PROCEDURE — 74177 CT ABD & PELVIS W/CONTRAST: CPT

## 2020-08-04 PROCEDURE — 6360000004 HC RX CONTRAST MEDICATION: Performed by: EMERGENCY MEDICINE

## 2020-08-04 RX ORDER — DICYCLOMINE HYDROCHLORIDE 10 MG/1
10 CAPSULE ORAL 3 TIMES DAILY
Qty: 15 CAPSULE | Refills: 0 | Status: ON HOLD | OUTPATIENT
Start: 2020-08-04 | End: 2022-04-21

## 2020-08-04 RX ADMIN — IOPAMIDOL 75 ML: 755 INJECTION, SOLUTION INTRAVENOUS at 00:24

## 2020-08-04 NOTE — ED NOTES
Discharge instructions reviewed. Pt verbalized understanding.        Mamadou Beckett RN  08/04/20 5150

## 2020-08-04 NOTE — ED PROVIDER NOTES
Triage Chief Complaint:   Abdominal Pain    Yocha Dehe:  Eliana De Dios is a 53 y.o. male that presents with periumbilical abdominal pain since yesterday.  + diarrhea. Nonbloody. States he is having a couple of episodes of diarrhea today. No nausea or vomiting. No fever or chills. No dysuria or increased urinary frequency. No chest pain, shortness of breath or cough. Had polyps taken out of colon a few months ago. Had a history of Meckel's diverticulum as a child but has not had any other abdominal surgeries. ROS:   Review of Systems   Constitutional: Negative for chills and fever. HENT: Negative for congestion, rhinorrhea and sore throat. Eyes: Negative for redness and visual disturbance. Respiratory: Negative for cough and shortness of breath. Cardiovascular: Negative for chest pain and leg swelling. Gastrointestinal: Positive for abdominal pain and diarrhea. Negative for constipation, nausea and vomiting. Genitourinary: Negative for dysuria and frequency. Musculoskeletal: Negative for arthralgias and back pain. Skin: Negative for rash and wound. Neurological: Negative for syncope and headaches. Psychiatric/Behavioral: Negative for hallucinations and suicidal ideas. Past Medical History:   Diagnosis Date    Cerebral [facility]y ([facility] [address]     Depression     Hypertension     Seizures ([facility] [address]      Past Surgical History:   Procedure Laterality Date    ABDOMEN SURGERY      EYE SURGERY      UPPER GASTROINTESTINAL ENDOSCOPY N/A 12/3/2019    EGD BIOPSY performed by Philip Narayan MD at [place] [address] reviewed. No pertinent family history.   Social History     Socioeconomic History    Marital status: Single     Spouse name: Not on file    Number of children: Not on file    Years of education: Not on file    Highest education level: Not on file   Occupational History    Not on file   Social Needs    Financial resource strain: Not on file    Food insecurity     Worry: Not on file     Inability: Not on file    Transportation needs     Medical: Not on file     Non-medical: Not on file   Tobacco Use    Smoking status: Never Smoker    Smokeless tobacco: Never Used   Substance and Sexual Activity    Alcohol use: No    Drug use: No    Sexual activity: Not on file   Lifestyle    Physical activity     Days per week: Not on file     Minutes per session: Not on file    Stress: Not on file   Relationships    Social connections     Talks on phone: Not on file     Gets together: Not on file     Attends Yarsanism service: Not on file     Active member of club or organization: Not on file     Attends meetings of clubs or organizations: Not on file     Relationship status: Not on file    Intimate partner violence     Fear of current or ex partner: Not on file     Emotionally abused: Not on file     Physically abused: Not on file     Forced sexual activity: Not on file   Other Topics Concern    Not on file   Social History Narrative    Not on file     No current facility-administered medications for this encounter.       Current Outpatient Medications   Medication Sig Dispense Refill    dicyclomine (BENTYL) 10 MG capsule Take 1 capsule by mouth 3 times daily As needed for abdominal pain 15 capsule 0    pantoprazole (PROTONIX) 40 MG tablet Take 1 tablet by mouth 2 times daily (before meals) 60 tablet 0    levothyroxine (SYNTHROID) 25 MCG tablet Take 25 mcg by mouth Daily      acetaminophen (AMINOFEN) 325 MG tablet Take 2 tablets by mouth every 6 hours as needed for Pain 30 tablet 0    levETIRAcetam (KEPPRA) 500 MG tablet Take 1 tablet by mouth 2 times daily 60 tablet 0    PARoxetine (PAXIL) 40 MG tablet Take 1 tablet by mouth every morning 30 tablet 0    amLODIPine (NORVASC) 10 MG tablet Take 1 tablet by mouth every morning 30 tablet 0     No Known Allergies    Nursing Notes Reviewed     Physical Exam:   ED Triage Vitals   Enc Vitals Group      BP 08/03/20 1923 (!) 139/95      Pulse 08/03/20 1923 100      Resp 08/03/20 1923 15      Temp 08/03/20 1923 98.6 °F (37 °C)      Temp Source 08/03/20 1923 Oral      SpO2 08/03/20 1923 94 %      Weight 08/03/20 1920 168 lb (76.2 kg)      Height 08/03/20 1920 5' 7\" (1.702 m)      Head Circumference --       Peak Flow --       Pain Score --       Pain Loc --       Pain Edu? --       Excl. in [address]? --      BP (!) 139/95   Pulse 100   Temp 98.6 °F (37 °C) (Oral)   Resp 15   Ht 5' 7\" (1.702 m)   Wt 168 lb (76.2 kg)   SpO2 94%   BMI 26.31 kg/m²   My pulse ox interpretation is - normal  Physical Exam  Constitutional:       General: He is not in acute distress. Appearance: He is well-developed. He is not diaphoretic. HENT:      Head: Normocephalic and atraumatic. Eyes:      General:         Right eye: No discharge. Left eye: No discharge. Conjunctiva/sclera: Conjunctivae normal.   Cardiovascular:      Rate and Rhythm: Normal rate and regular rhythm. Pulmonary:      Effort: Pulmonary effort is normal. No respiratory distress. Breath sounds: Normal breath sounds. Abdominal:      General: There is no distension. Palpations: Abdomen is soft. Tenderness: There is abdominal tenderness (mild periumbilical and suprapubic). There is no guarding or rebound. Musculoskeletal: Normal range of motion. General: No swelling or signs of injury. Skin:     General: Skin is warm and dry. Neurological:      General: No focal deficit present. Mental Status: He is alert. Cranial Nerves: No cranial nerve deficit.    Psychiatric:         Mood and Affect: Mood normal.         Behavior: Behavior normal.         I have reviewed and interpreted all of the currently available lab results from this visit (if applicable):  Results for orders placed or performed during the hospital encounter of 08/03/20   Urinalysis   Result Value Ref Range    Color, UA STRAW (A) YELLOW    Clarity, UA CLEAR CLEAR    Glucose, Urine NEGATIVE American >60 >60 mL/min/1.73m2    Anion Gap 12 4 - 16   Lipase   Result Value Ref Range    Lipase 90 (H) 13 - 60 IU/L   Troponin   Result Value Ref Range    Troponin T <0.010 <0.01 NG/ML   EKG 12 Lead   Result Value Ref Range    Ventricular Rate 76 BPM    Atrial Rate 76 BPM    P-R Interval 152 ms    QRS Duration 100 ms    Q-T Interval 390 ms    QTc Calculation (Bazett) 438 ms    P Axis 15 degrees    R Axis 21 degrees    T Axis 16 degrees    Diagnosis       Normal sinus rhythm  Normal ECG  When compared with ECG of 30-NOV-2019 17:27,  Vent. rate has decreased BY  42 BPM  Confirmed by [facility] Reid DURAN ([id number]) on 8/4/2020 5:03:40 PM        Radiographs (if obtained):  [] The following radiograph was interpreted by myself in the absence of a radiologist:  [x]Radiologist's Report Reviewed:  CT ABDOMEN PELVIS W IV CONTRAST Additional Contrast? None   Final Result   1. No acute findings in the abdomen or pelvis. 2. Persistent circumferential wall thickening in the distal thoracic   esophagus likely due to esophagitis given a tiny sliding hiatal hernia as   well as findings of esophagitis at prior endoscopy. 3. Additional incidental findings as above, including hepatic steatosis. XR CHEST PORTABLE   Final Result   No acute cardiopulmonary process. EKG (if obtained): (All EKG's are interpreted by myself in the absence of a cardiologist)  Normal sinus rhythm with a rate of 76. MD interval 152, , QTc 438. No ST elevations or depressions. Normal T waves. Impression: Normal EKG. When compared to previous EKG from 11/30/2019, the previously noted tachycardia is resolved, otherwise no significant changes. MDM:  Differential diagnoses considered include but are not limited to abdominal cramping, gastritis, gastroenteritis, diverticulitis, colitis, urinary tract infection, renal colic, appendicitis. Basic labs are obtained and are unremarkable. Slightly elevated lipase level.   EKG is not concerning for acute ischemia and troponin is negative. Urinalysis is not concerning for an infection. CT scan of patient's abdomen shows no acute findings. There is persistent wall thickening of the distal esophagus likely due to esophagitis. Patient was given 1 dose of Bentyl in the emergency department and is feeling better  Repeat abdominal exam is benign. I do not suspect an emergent cause of patient's symptoms. We will discharge him home in stable condition. Prescription. Plan of care explained to patient. Concerning signs and symptoms warranting a return visit to the Emergency Department were explained in detail. All questions and concerns were addressed to the patient's satisfaction. Patient understood and agreed with plan. I did don appropriate PPE (including N95 face mask, protective eye ware/safety glasses, gloves, hair covering, and no isolation gown), as recommended by the health facility/national standard best practice, during my bedside interactions with the patient. The likelihood of other entities in the differential is insufficient to justify any further testing for them. This was explained to the patient. The patient was advised that persistent or worsening symptoms would requirefurther evaluation. Clinical Impression:  1. Periumbilical abdominal pain          Jason Argueta MD       Please note that portions of this note may have been complete with a voice recognition program.  Effortswere made to edit the dictations, but occasional words are mis-transcribed.           Jason Argueta MD  08/06/20 5559

## 2020-08-06 LAB
EKG ATRIAL RATE: 76 BPM
EKG DIAGNOSIS: NORMAL
EKG P AXIS: 15 DEGREES
EKG P-R INTERVAL: 152 MS
EKG Q-T INTERVAL: 390 MS
EKG QRS DURATION: 100 MS
EKG QTC CALCULATION (BAZETT): 438 MS
EKG R AXIS: 21 DEGREES
EKG T AXIS: 16 DEGREES
EKG VENTRICULAR RATE: 76 BPM

## 2020-08-25 ENCOUNTER — HOSPITAL ENCOUNTER (EMERGENCY)
Age: 55
Discharge: HOME | End: 2020-08-26
Attending: EMERGENCY MEDICINE
Payer: MEDICARE

## 2020-08-25 ENCOUNTER — APPOINTMENT (OUTPATIENT)
Dept: GENERAL RADIOLOGY | Age: 55
End: 2020-08-25
Payer: MEDICARE

## 2020-08-25 ENCOUNTER — APPOINTMENT (OUTPATIENT)
Dept: CT IMAGING | Age: 55
End: 2020-08-25
Payer: MEDICARE

## 2020-08-25 VITALS
WEIGHT: 168 LBS | OXYGEN SATURATION: 94 % | HEART RATE: 97 BPM | TEMPERATURE: 98 F | SYSTOLIC BLOOD PRESSURE: 132 MMHG | HEIGHT: 67 IN | DIASTOLIC BLOOD PRESSURE: 97 MMHG | BODY MASS INDEX: 26.37 KG/M2 | RESPIRATION RATE: 16 BRPM

## 2020-08-25 LAB
ALBUMIN SERPL-MCNC: 4.3 GM/DL (ref 3.4–5)
ALP BLD-CCNC: 91 IU/L (ref 40–129)
ALT SERPL-CCNC: 64 U/L (ref 10–40)
ANION GAP SERPL CALCULATED.3IONS-SCNC: 12 MMOL/L (ref 4–16)
AST SERPL-CCNC: 32 IU/L (ref 15–37)
BASOPHILS ABSOLUTE: 0.1 K/CU MM
BASOPHILS RELATIVE PERCENT: 0.8 % (ref 0–1)
BILIRUB SERPL-MCNC: 0.9 MG/DL (ref 0–1)
BUN BLDV-MCNC: 15 MG/DL (ref 6–23)
CALCIUM SERPL-MCNC: 8.9 MG/DL (ref 8.3–10.6)
CHLORIDE BLD-SCNC: 106 MMOL/L (ref 99–110)
CO2: 23 MMOL/L (ref 21–32)
CREAT SERPL-MCNC: 1 MG/DL (ref 0.9–1.3)
DIFFERENTIAL TYPE: ABNORMAL
EOSINOPHILS ABSOLUTE: 0.8 K/CU MM
EOSINOPHILS RELATIVE PERCENT: 9.2 % (ref 0–3)
GFR AFRICAN AMERICAN: >60 ML/MIN/1.73M2
GFR NON-AFRICAN AMERICAN: >60 ML/MIN/1.73M2
GLUCOSE BLD-MCNC: 157 MG/DL (ref 70–99)
HCT VFR BLD CALC: 46.7 % (ref 42–52)
HEMOGLOBIN: 15.8 GM/DL (ref 13.5–18)
IMMATURE NEUTROPHIL %: 0.3 % (ref 0–0.43)
LYMPHOCYTES ABSOLUTE: 2.1 K/CU MM
LYMPHOCYTES RELATIVE PERCENT: 24.1 % (ref 24–44)
MAGNESIUM: 2.2 MG/DL (ref 1.8–2.4)
MCH RBC QN AUTO: 31.2 PG (ref 27–31)
MCHC RBC AUTO-ENTMCNC: 33.8 % (ref 32–36)
MCV RBC AUTO: 92.1 FL (ref 78–100)
MONOCYTES ABSOLUTE: 1.3 K/CU MM
MONOCYTES RELATIVE PERCENT: 15.1 % (ref 0–4)
NUCLEATED RBC %: 0 %
PDW BLD-RTO: 11.8 % (ref 11.7–14.9)
PLATELET # BLD: 210 K/CU MM (ref 140–440)
PMV BLD AUTO: 9.7 FL (ref 7.5–11.1)
POTASSIUM SERPL-SCNC: 3.5 MMOL/L (ref 3.5–5.1)
RBC # BLD: 5.07 M/CU MM (ref 4.6–6.2)
SEGMENTED NEUTROPHILS ABSOLUTE COUNT: 4.3 K/CU MM
SEGMENTED NEUTROPHILS RELATIVE PERCENT: 50.5 % (ref 36–66)
SODIUM BLD-SCNC: 141 MMOL/L (ref 135–145)
TOTAL IMMATURE NEUTOROPHIL: 0.03 K/CU MM
TOTAL NUCLEATED RBC: 0 K/CU MM
TOTAL PROTEIN: 6.8 GM/DL (ref 6.4–8.2)
WBC # BLD: 8.6 K/CU MM (ref 4–10.5)

## 2020-08-25 PROCEDURE — 2580000003 HC RX 258: Performed by: EMERGENCY MEDICINE

## 2020-08-25 PROCEDURE — 85025 COMPLETE CBC W/AUTO DIFF WBC: CPT

## 2020-08-25 PROCEDURE — 80053 COMPREHEN METABOLIC PANEL: CPT

## 2020-08-25 PROCEDURE — 96368 THER/DIAG CONCURRENT INF: CPT

## 2020-08-25 PROCEDURE — 84484 ASSAY OF TROPONIN QUANT: CPT

## 2020-08-25 PROCEDURE — 71046 X-RAY EXAM CHEST 2 VIEWS: CPT

## 2020-08-25 PROCEDURE — 96375 TX/PRO/DX INJ NEW DRUG ADDON: CPT

## 2020-08-25 PROCEDURE — 83880 ASSAY OF NATRIURETIC PEPTIDE: CPT

## 2020-08-25 PROCEDURE — 99285 EMERGENCY DEPT VISIT HI MDM: CPT

## 2020-08-25 PROCEDURE — 36415 COLL VENOUS BLD VENIPUNCTURE: CPT

## 2020-08-25 PROCEDURE — 83735 ASSAY OF MAGNESIUM: CPT

## 2020-08-25 PROCEDURE — 96365 THER/PROPH/DIAG IV INF INIT: CPT

## 2020-08-25 PROCEDURE — 83690 ASSAY OF LIPASE: CPT

## 2020-08-25 PROCEDURE — 6360000002 HC RX W HCPCS: Performed by: EMERGENCY MEDICINE

## 2020-08-25 RX ORDER — LORAZEPAM 2 MG/ML
1 INJECTION INTRAMUSCULAR ONCE
Status: COMPLETED | OUTPATIENT
Start: 2020-08-25 | End: 2020-08-25

## 2020-08-25 RX ORDER — SODIUM CHLORIDE, SODIUM LACTATE, POTASSIUM CHLORIDE, CALCIUM CHLORIDE 600; 310; 30; 20 MG/100ML; MG/100ML; MG/100ML; MG/100ML
1000 INJECTION, SOLUTION INTRAVENOUS ONCE
Status: COMPLETED | OUTPATIENT
Start: 2020-08-25 | End: 2020-08-26

## 2020-08-25 RX ADMIN — LORAZEPAM 1 MG: 2 INJECTION INTRAMUSCULAR; INTRAVENOUS at 23:56

## 2020-08-25 RX ADMIN — SODIUM CHLORIDE, POTASSIUM CHLORIDE, SODIUM LACTATE AND CALCIUM CHLORIDE 1000 ML: 600; 310; 30; 20 INJECTION, SOLUTION INTRAVENOUS at 23:41

## 2020-08-25 RX ADMIN — LEVETIRACETAM 1000 MG: 100 INJECTION, SOLUTION INTRAVENOUS at 23:56

## 2020-08-26 ENCOUNTER — APPOINTMENT (OUTPATIENT)
Dept: CT IMAGING | Age: 55
End: 2020-08-26
Payer: MEDICARE

## 2020-08-26 LAB
LIPASE: 57 IU/L (ref 13–60)
PRO-BNP: 20.25 PG/ML
TROPONIN T: <0.01 NG/ML

## 2020-08-26 PROCEDURE — 96366 THER/PROPH/DIAG IV INF ADDON: CPT

## 2020-08-26 PROCEDURE — 70450 CT HEAD/BRAIN W/O DYE: CPT

## 2020-08-26 ASSESSMENT — ENCOUNTER SYMPTOMS
STRIDOR: 0
BLOOD IN STOOL: 0
EYES NEGATIVE: 1
DIARRHEA: 0
CHOKING: 0
WHEEZING: 0
RECTAL PAIN: 0
GASTROINTESTINAL NEGATIVE: 1
SINUS PRESSURE: 0
CONSTIPATION: 0
NAUSEA: 0
COUGH: 0
FACIAL SWELLING: 0
RHINORRHEA: 0
PHOTOPHOBIA: 0
APNEA: 0
EYE REDNESS: 0
SHORTNESS OF BREATH: 0
ABDOMINAL PAIN: 0
RESPIRATORY NEGATIVE: 1
EYE DISCHARGE: 0
VOICE CHANGE: 0
TROUBLE SWALLOWING: 0
EYE PAIN: 0
SINUS PAIN: 0
EYE ITCHING: 0
BACK PAIN: 0
CHEST TIGHTNESS: 0
VOMITING: 0

## 2020-08-26 NOTE — ED PROVIDER NOTES
[address] ENCOUNTER      Pt Name: Deandra Go  MRN: [id number]  [place] 1965  Date of evaluation: 8/25/2020  Provider: Abida Briggs, [address] [place] [address]       Chief Complaint   Patient presents with    Seizures     group home staff states 3 min seizure, no postictal state, hx of seizures, on keppra BID          HISTORY OF PRESENT ILLNESS      Deandra Go is a 53 y.o. male who presents to the emergency department  for   Chief Complaint   Patient presents with    Seizures     group home staff states 3 min seizure, no postictal state, hx of seizures, on keppra BID        The history is provided by the patient, the EMS personnel and medical records. No  was used.    Seizures   Seizure activity on arrival: no    Seizure type:  Grand mal  Preceding symptoms: no sensation of an aura present, no dizziness, no euphoria, no headache, no hyperventilation, no nausea, no numbness, no panic and no vision change    Initial focality:  None  Episode characteristics: abnormal movements and generalized shaking    Episode characteristics: no apnea, no combativeness, no confusion, no disorientation, no eye deviation, no focal shaking, no incontinence, no limpness, fully responsive, no stiffening, no tongue biting and responsive    Postictal symptoms: no confusion, no memory loss and no somnolence    Return to baseline: yes    Severity:  Moderate  Duration:  3 minutes  Timing:  Once  Progression:  Resolved  Context: cerebral palsy and developmental delay    Context: not alcohol withdrawal, not change in medication, not sleeping less, not drug use, not emotional upset, not fever, not hydrocephalus, not intracranial lesion, not intracranial shunt, medical compliance, not possible hypoglycemia, not possible medication ingestion, not pregnant, not previous head injury and not stress    Recent head injury:  No recent head injuries  PTA treatment:  None  History of seizures: yes          Nursing Notes, Triage Notes & Vital Signs were reviewed. REVIEW OF SYSTEMS    (2-9 systems for level 4, 10 or more for level 5)     Review of Systems   Constitutional: Negative. Negative for activity change, appetite change, chills, fatigue and fever. HENT: Negative. Negative for congestion, dental problem, drooling, facial swelling, nosebleeds, postnasal drip, rhinorrhea, sinus pressure, sinus pain, tinnitus, trouble swallowing and voice change. Eyes: Negative. Negative for photophobia, pain, discharge, redness, itching and visual disturbance. Respiratory: Negative. Negative for apnea, cough, choking, chest tightness, shortness of breath, wheezing and stridor. Cardiovascular: Negative. Negative for chest pain, palpitations and leg swelling. Gastrointestinal: Negative. Negative for abdominal pain, blood in stool, constipation, diarrhea, nausea, rectal pain and vomiting. Endocrine: Negative for polyuria. Genitourinary: Negative. Negative for dysuria, flank pain, frequency, hematuria and urgency. Musculoskeletal: Negative. Negative for arthralgias, back pain, gait problem, myalgias, neck pain and neck stiffness. Skin: Negative. Negative for rash. Neurological: Positive for seizures. Negative for dizziness, speech difficulty, light-headedness, numbness and headaches. Psychiatric/Behavioral: Negative. Negative for agitation, confusion, self-injury, sleep disturbance and suicidal ideas. The patient is not nervous/anxious. All other systems reviewed and are negative. Except as noted above the remainder of the review of systems was reviewed and negative. PAST MEDICAL HISTORY     Past Medical History:   Diagnosis Date    C[facility] palsy ([facility] [address]     Depression     Hypertension     Seizures ([facility] [address]        Prior to Admission medications    Medication Sig Start Date End Date Taking?  Authorizing Provider   dicyclomine (BENTYL) 10 MG capsule Take 1 capsule by mouth 3 times daily As needed for abdominal pain 8/4/20   Brandi Main MD   pantoprazole (PROTONIX) 40 MG tablet Take 1 tablet by mouth 2 times daily (before meals) 12/3/19   Brigida Jensen MD   levothyroxine (SYNTHROID) 25 MCG tablet Take 25 mcg by mouth Daily    Historical Provider, MD   acetaminophen (AMINOFEN) 325 MG tablet Take 2 tablets by mouth every 6 hours as needed for Pain 1/18/19   Juana Gilford, MD   levETIRAcetam (KEPPRA) 500 MG tablet Take 1 tablet by mouth 2 times daily 3/22/16   C Emmanuel Monzon MD   PARoxetine (PAXIL) 40 MG tablet Take 1 tablet by mouth every morning 3/22/16   C Emmanuel Monzon MD   amLODIPine (NORVASC) 10 MG tablet Take 1 tablet by mouth every morning 3/22/16   C Emmanuel Monzon MD        Patient Active Problem List   Diagnosis    Pneumonia    Cerebral palsy, hemiplegic ([address]    Hypoxia    Seizure disorder ([address]    Gait disturbance    Pneumonia of both lungs due to infectious organism    Acute upper GI bleed    Cecal polyp    Sebaceous cyst         SURGICAL HISTORY       Past Surgical History:   Procedure Laterality Date    ABDOMEN SURGERY      EYE SURGERY      UPPER GASTROINTESTINAL ENDOSCOPY N/A 12/3/2019    EGD BIOPSY performed by Ruthie Nava MD at [address] [place] [address]       Previous Medications    ACETAMINOPHEN (AMINOFEN) 325 MG TABLET    Take 2 tablets by mouth every 6 hours as needed for Pain    AMLODIPINE (NORVASC) 10 MG TABLET    Take 1 tablet by mouth every morning    DICYCLOMINE (BENTYL) 10 MG CAPSULE    Take 1 capsule by mouth 3 times daily As needed for abdominal pain    LEVETIRACETAM (KEPPRA) 500 MG TABLET    Take 1 tablet by mouth 2 times daily    LEVOTHYROXINE (SYNTHROID) 25 MCG TABLET    Take 25 mcg by mouth Daily    PANTOPRAZOLE (PROTONIX) 40 MG TABLET    Take 1 tablet by mouth 2 times daily (before meals)    PAROXETINE (PAXIL) 40 MG TABLET    Take 1 tablet by mouth cerumen. Left Ear: Tympanic membrane, ear canal and external ear normal. There is no impacted cerumen. Nose: Nose normal. No congestion or rhinorrhea. Mouth/Throat:      Mouth: Mucous membranes are dry. Pharynx: Oropharynx is clear. No oropharyngeal exudate or posterior oropharyngeal erythema. Eyes:      General: No scleral icterus. Right eye: No discharge. Left eye: No discharge. Extraocular Movements: Extraocular movements intact. Conjunctiva/sclera: Conjunctivae normal.      Pupils: Pupils are equal, round, and reactive to light. Neck:      Musculoskeletal: Normal range of motion and neck supple. No neck rigidity or muscular tenderness. Vascular: No carotid bruit. Cardiovascular:      Rate and Rhythm: Normal rate. Pulses: Normal pulses. Heart sounds: Normal heart sounds. No murmur. No friction rub. No gallop. Pulmonary:      Effort: Pulmonary effort is normal. No respiratory distress. Breath sounds: Normal breath sounds. No stridor. No wheezing, rhonchi or rales. Chest:      Chest wall: No tenderness. Abdominal:      General: Abdomen is flat. Bowel sounds are normal. There is no distension. Palpations: Abdomen is soft. There is no mass. Tenderness: There is no abdominal tenderness. There is no right CVA tenderness, left CVA tenderness, guarding or rebound. Hernia: No hernia is present. Musculoskeletal: Normal range of motion. General: No swelling, tenderness, deformity or signs of injury. Right lower leg: No edema. Left lower leg: No edema. Lymphadenopathy:      Cervical: No cervical adenopathy. Skin:     Capillary Refill: Capillary refill takes less than 2 seconds. Coloration: Skin is not jaundiced or pale. Findings: No bruising, erythema, lesion or rash. Neurological:      General: No focal deficit present. Mental Status: He is alert and oriented to person, place, and time. limiting evaluation. Bibasilar atelectasis is   seen. ED BEDSIDE ULTRASOUND:   Performed by ED Physician Sky Parham DO       LABS:  Labs Reviewed   COMPREHENSIVE METABOLIC PANEL - Abnormal; Notable for the following components:       Result Value    Glucose 157 (*)     ALT 64 (*)     All other components within normal limits   CBC WITH AUTO DIFFERENTIAL - Abnormal; Notable for the following components:    MCH 31.2 (*)     Monocytes % 15.1 (*)     Eosinophils % 9.2 (*)     All other components within normal limits   MAGNESIUM   BRAIN NATRIURETIC PEPTIDE   TROPONIN   LIPASE   BLOOD GAS, VENOUS       All other labs were within normal range or not returned as of this dictation. EMERGENCY DEPARTMENT COURSE and DIFFERENTIAL DIAGNOSIS/MDM:   Vitals:    Vitals:    08/25/20 2122   BP: (!) 132/97   Pulse: 97   Resp: 16   Temp: 98 °F (36.7 °C)   TempSrc: Oral   SpO2: 94%   Weight: 168 lb (76.2 kg)   Height: 5' 7\" (1.702 m)           MDM  Number of Diagnoses or Management Options  Diagnosis management comments: 26-year-old male presents emergency department from a group home for reported seizure. Witnesses reported seizure that lasted 3 minutes. Patient does have a history of seizures and takes Keppra. Patient reports his last seizure was a decade ago. CT of the head was negative. Lab work was negative including sodium level. Patient is at his normal baseline. Patient does have a history of cerebral palsy. Patient has no neurologic deficits whatsoever. HI NTS exam is normal.  Patient was given Keppra in the emergency department and 1 mg of Ativan. Patient had no seizure activity while in the emergency department. Will discharge patient to home with return precautions and primary care follow-up. Patient is to resume his Keppra as previously ordered. Witnesses reported no postictal phase on this patient. Unclear if this was a true seizure.   Patient is negative per [facility] syncope rules. Amount and/or Complexity of Data Reviewed  Clinical lab tests: ordered and reviewed  Tests in the radiology section of CPT®: ordered and reviewed  Tests in the medicine section of CPT®: ordered and reviewed    Risk of Complications, Morbidity, and/or Mortality  Presenting problems: moderate  Diagnostic procedures: moderate  Management options: moderate    Critical Care  Total time providing critical care: < 30 minutes    Patient Progress  Patient progress: improved        REASSESSMENT          CRITICAL CARE TIME     This excludes seperately billable procedures and family discussion time. Critical care time provided for obtaining history, conducting a physical exam, performing and monitoring interventions, ordering, collecting and interpreting tests, and establishing medical decision-making. There was a potential for life/limb threatening pathology requiring close evaluation and intervention with concern for patient decompensation. CONSULTS:  None    PROCEDURES:  None performed unless otherwise noted below     Procedures        FINAL IMPRESSION      1. Seizure ([facility] [address]    2. Seizure disorder ([facility] [address]    3. Convulsions, unspecified convulsion type [facility])          DISPOSITION/PLAN   DISPOSITION Decision To Discharge 08/26/2020 12:49:45 AM      PATIENT REFERRED TO:  Randy Trinidad MD  [facility]  [phone]    In 1 day        DISCHARGE MEDICATIONS:  New Prescriptions    No medications on file       ED Provider Disposition Time  DISPOSITION Decision To Discharge 08/26/2020 12:49:45 AM      Appropriate personal protective equipment was worn during the patient's evaluation. These included surgical, eye protection, surgical mask or in 95 respirator and gloves. The patient was also placed in a surgical mask for the prevention of possible spread of respiratory viral illnesses. The Patient was instructed to read the package inserts with any medication that was prescribed.   Major potential

## 2021-06-01 ENCOUNTER — HOSPITAL ENCOUNTER (INPATIENT)
Age: 56
LOS: 7 days | Discharge: HOME | DRG: 871 | End: 2021-06-09
Attending: INTERNAL MEDICINE | Admitting: INTERNAL MEDICINE
Payer: MEDICARE

## 2021-06-01 ENCOUNTER — APPOINTMENT (OUTPATIENT)
Dept: GENERAL RADIOLOGY | Age: 56
DRG: 871 | End: 2021-06-01
Payer: MEDICARE

## 2021-06-01 DIAGNOSIS — J18.9 PNEUMONIA OF BOTH LUNGS DUE TO INFECTIOUS ORGANISM, UNSPECIFIED PART OF LUNG: ICD-10-CM

## 2021-06-01 DIAGNOSIS — R09.02 HYPOXIA: ICD-10-CM

## 2021-06-01 DIAGNOSIS — J18.9 MULTIFOCAL PNEUMONIA: Primary | ICD-10-CM

## 2021-06-01 DIAGNOSIS — K76.0 HEPATIC STEATOSIS: ICD-10-CM

## 2021-06-01 DIAGNOSIS — K44.9 HIATAL HERNIA: ICD-10-CM

## 2021-06-01 PROCEDURE — 83880 ASSAY OF NATRIURETIC PEPTIDE: CPT

## 2021-06-01 PROCEDURE — 85025 COMPLETE CBC W/AUTO DIFF WBC: CPT

## 2021-06-01 PROCEDURE — 87081 CULTURE SCREEN ONLY: CPT

## 2021-06-01 PROCEDURE — 80053 COMPREHEN METABOLIC PANEL: CPT

## 2021-06-01 PROCEDURE — 87635 SARS-COV-2 COVID-19 AMP PRB: CPT

## 2021-06-01 PROCEDURE — 71045 X-RAY EXAM CHEST 1 VIEW: CPT

## 2021-06-01 PROCEDURE — 96365 THER/PROPH/DIAG IV INF INIT: CPT

## 2021-06-01 PROCEDURE — 99285 EMERGENCY DEPT VISIT HI MDM: CPT

## 2021-06-01 PROCEDURE — 87430 STREP A AG IA: CPT

## 2021-06-01 PROCEDURE — 93005 ELECTROCARDIOGRAM TRACING: CPT | Performed by: PHYSICIAN ASSISTANT

## 2021-06-01 RX ORDER — GUAIFENESIN 600 MG/1
600 TABLET, EXTENDED RELEASE ORAL ONCE
Status: COMPLETED | OUTPATIENT
Start: 2021-06-01 | End: 2021-06-02

## 2021-06-01 RX ORDER — ACETAMINOPHEN 500 MG
1000 TABLET ORAL ONCE
Status: COMPLETED | OUTPATIENT
Start: 2021-06-01 | End: 2021-06-02

## 2021-06-01 RX ORDER — BENZONATATE 100 MG/1
100 CAPSULE ORAL ONCE
Status: COMPLETED | OUTPATIENT
Start: 2021-06-01 | End: 2021-06-02

## 2021-06-02 ENCOUNTER — APPOINTMENT (OUTPATIENT)
Dept: CT IMAGING | Age: 56
DRG: 871 | End: 2021-06-02
Payer: MEDICARE

## 2021-06-02 LAB
ADENOVIRUS DETECTION BY PCR: NOT DETECTED
ALBUMIN SERPL-MCNC: 4.1 GM/DL (ref 3.4–5)
ALP BLD-CCNC: 72 IU/L (ref 40–129)
ALT SERPL-CCNC: 39 U/L (ref 10–40)
ANION GAP SERPL CALCULATED.3IONS-SCNC: 10 MMOL/L (ref 4–16)
AST SERPL-CCNC: 25 IU/L (ref 15–37)
BASE EXCESS MIXED: 1 (ref 0–1.2)
BASOPHILS ABSOLUTE: 0.1 K/CU MM
BASOPHILS RELATIVE PERCENT: 0.6 % (ref 0–1)
BILIRUB SERPL-MCNC: 1 MG/DL (ref 0–1)
BORDETELLA PARAPERTUSSIS BY PCR: NOT DETECTED
BORDETELLA PERTUSSIS PCR: NOT DETECTED
BUN BLDV-MCNC: 13 MG/DL (ref 6–23)
CALCIUM SERPL-MCNC: 8.4 MG/DL (ref 8.3–10.6)
CHLAMYDOPHILA PNEUMONIA PCR: NOT DETECTED
CHLORIDE BLD-SCNC: 100 MMOL/L (ref 99–110)
CO2: 25 MMOL/L (ref 21–32)
COMMENT: ABNORMAL
CORONAVIRUS 229E PCR: NOT DETECTED
CORONAVIRUS HKU1 PCR: NOT DETECTED
CORONAVIRUS NL63 PCR: NOT DETECTED
CORONAVIRUS OC43 PCR: NOT DETECTED
CREAT SERPL-MCNC: 1 MG/DL (ref 0.9–1.3)
DIFFERENTIAL TYPE: ABNORMAL
EKG ATRIAL RATE: 106 BPM
EKG DIAGNOSIS: NORMAL
EKG P AXIS: 27 DEGREES
EKG P-R INTERVAL: 132 MS
EKG Q-T INTERVAL: 330 MS
EKG QRS DURATION: 82 MS
EKG QTC CALCULATION (BAZETT): 438 MS
EKG R AXIS: 25 DEGREES
EKG T AXIS: 28 DEGREES
EKG VENTRICULAR RATE: 106 BPM
EOSINOPHILS ABSOLUTE: 0.2 K/CU MM
EOSINOPHILS RELATIVE PERCENT: 2 % (ref 0–3)
GFR AFRICAN AMERICAN: >60 ML/MIN/1.73M2
GFR NON-AFRICAN AMERICAN: >60 ML/MIN/1.73M2
GLUCOSE BLD-MCNC: 117 MG/DL (ref 70–99)
HCO3 VENOUS: 27.1 MMOL/L (ref 19–25)
HCT VFR BLD CALC: 43.2 % (ref 42–52)
HEMOGLOBIN: 14.8 GM/DL (ref 13.5–18)
HUMAN METAPNEUMOVIRUS PCR: NOT DETECTED
IMMATURE NEUTROPHIL %: 0.5 % (ref 0–0.43)
INFLUENZA A BY PCR: NOT DETECTED
INFLUENZA A H1 (2009) PCR: NOT DETECTED
INFLUENZA A H1 PANDEMIC PCR: NOT DETECTED
INFLUENZA A H3 PCR: NOT DETECTED
INFLUENZA B BY PCR: NOT DETECTED
LACTIC ACID, SEPSIS: 1.5 MMOL/L (ref 0.5–1.9)
LYMPHOCYTES ABSOLUTE: 1.4 K/CU MM
LYMPHOCYTES RELATIVE PERCENT: 17.8 % (ref 24–44)
MCH RBC QN AUTO: 31.6 PG (ref 27–31)
MCHC RBC AUTO-ENTMCNC: 34.3 % (ref 32–36)
MCV RBC AUTO: 92.1 FL (ref 78–100)
MONOCYTES ABSOLUTE: 1.4 K/CU MM
MONOCYTES RELATIVE PERCENT: 17.8 % (ref 0–4)
MYCOPLASMA PNEUMONIAE PCR: NOT DETECTED
NUCLEATED RBC %: 0 %
O2 SAT, VEN: 93.7 % (ref 50–70)
PARAINFLUENZA 1 PCR: NOT DETECTED
PARAINFLUENZA 2 PCR: NOT DETECTED
PARAINFLUENZA 3 PCR: ABNORMAL
PARAINFLUENZA 4 PCR: NOT DETECTED
PCO2, VEN: 48 MMHG (ref 38–52)
PDW BLD-RTO: 12 % (ref 11.7–14.9)
PH VENOUS: 7.36 (ref 7.32–7.42)
PLATELET # BLD: 163 K/CU MM (ref 140–440)
PMV BLD AUTO: 10.1 FL (ref 7.5–11.1)
PO2, VEN: 85 MMHG (ref 28–48)
POTASSIUM SERPL-SCNC: 3.7 MMOL/L (ref 3.5–5.1)
PRO-BNP: 50.93 PG/ML
RBC # BLD: 4.69 M/CU MM (ref 4.6–6.2)
RHINOVIRUS ENTEROVIRUS PCR: NOT DETECTED
RSV PCR: NOT DETECTED
SARS-COV-2, NAAT: NOT DETECTED
SARS-COV-2: NOT DETECTED
SEGMENTED NEUTROPHILS ABSOLUTE COUNT: 4.9 K/CU MM
SEGMENTED NEUTROPHILS RELATIVE PERCENT: 61.3 % (ref 36–66)
SODIUM BLD-SCNC: 135 MMOL/L (ref 135–145)
SOURCE: NORMAL
TOTAL IMMATURE NEUTOROPHIL: 0.04 K/CU MM
TOTAL NUCLEATED RBC: 0 K/CU MM
TOTAL PROTEIN: 6.5 GM/DL (ref 6.4–8.2)
WBC # BLD: 8 K/CU MM (ref 4–10.5)

## 2021-06-02 PROCEDURE — 6360000002 HC RX W HCPCS: Performed by: PHYSICIAN ASSISTANT

## 2021-06-02 PROCEDURE — 2580000003 HC RX 258: Performed by: INTERNAL MEDICINE

## 2021-06-02 PROCEDURE — 74177 CT ABD & PELVIS W/CONTRAST: CPT

## 2021-06-02 PROCEDURE — 6370000000 HC RX 637 (ALT 250 FOR IP): Performed by: PHYSICIAN ASSISTANT

## 2021-06-02 PROCEDURE — 87040 BLOOD CULTURE FOR BACTERIA: CPT

## 2021-06-02 PROCEDURE — 6360000002 HC RX W HCPCS: Performed by: INTERNAL MEDICINE

## 2021-06-02 PROCEDURE — 6370000000 HC RX 637 (ALT 250 FOR IP): Performed by: INTERNAL MEDICINE

## 2021-06-02 PROCEDURE — 94640 AIRWAY INHALATION TREATMENT: CPT

## 2021-06-02 PROCEDURE — 93010 ELECTROCARDIOGRAM REPORT: CPT | Performed by: INTERNAL MEDICINE

## 2021-06-02 PROCEDURE — 83605 ASSAY OF LACTIC ACID: CPT

## 2021-06-02 PROCEDURE — 82805 BLOOD GASES W/O2 SATURATION: CPT

## 2021-06-02 PROCEDURE — 0202U NFCT DS 22 TRGT SARS-COV-2: CPT

## 2021-06-02 PROCEDURE — 1200000000 HC SEMI PRIVATE

## 2021-06-02 PROCEDURE — 6360000004 HC RX CONTRAST MEDICATION: Performed by: PHYSICIAN ASSISTANT

## 2021-06-02 PROCEDURE — 2580000003 HC RX 258: Performed by: PHYSICIAN ASSISTANT

## 2021-06-02 PROCEDURE — 71275 CT ANGIOGRAPHY CHEST: CPT

## 2021-06-02 RX ORDER — ONDANSETRON 2 MG/ML
4 INJECTION INTRAMUSCULAR; INTRAVENOUS EVERY 6 HOURS PRN
Status: DISCONTINUED | OUTPATIENT
Start: 2021-06-02 | End: 2021-06-09 | Stop reason: HOSPADM

## 2021-06-02 RX ORDER — PANTOPRAZOLE SODIUM 40 MG/1
40 TABLET, DELAYED RELEASE ORAL
Status: DISCONTINUED | OUTPATIENT
Start: 2021-06-02 | End: 2021-06-09 | Stop reason: HOSPADM

## 2021-06-02 RX ORDER — SODIUM CHLORIDE 0.9 % (FLUSH) 0.9 %
5-40 SYRINGE (ML) INJECTION PRN
Status: DISCONTINUED | OUTPATIENT
Start: 2021-06-02 | End: 2021-06-09 | Stop reason: HOSPADM

## 2021-06-02 RX ORDER — ALBUTEROL SULFATE 90 UG/1
2 AEROSOL, METERED RESPIRATORY (INHALATION) EVERY 6 HOURS PRN
Status: DISCONTINUED | OUTPATIENT
Start: 2021-06-02 | End: 2021-06-05

## 2021-06-02 RX ORDER — LEVETIRACETAM 500 MG/1
500 TABLET ORAL 2 TIMES DAILY
Status: DISCONTINUED | OUTPATIENT
Start: 2021-06-02 | End: 2021-06-09 | Stop reason: HOSPADM

## 2021-06-02 RX ORDER — POLYETHYLENE GLYCOL 3350 17 G/17G
17 POWDER, FOR SOLUTION ORAL DAILY PRN
Status: DISCONTINUED | OUTPATIENT
Start: 2021-06-02 | End: 2021-06-09 | Stop reason: HOSPADM

## 2021-06-02 RX ORDER — SODIUM CHLORIDE 9 MG/ML
25 INJECTION, SOLUTION INTRAVENOUS PRN
Status: DISCONTINUED | OUTPATIENT
Start: 2021-06-02 | End: 2021-06-09 | Stop reason: HOSPADM

## 2021-06-02 RX ORDER — LEVOTHYROXINE SODIUM 0.03 MG/1
25 TABLET ORAL DAILY
Status: DISCONTINUED | OUTPATIENT
Start: 2021-06-02 | End: 2021-06-09 | Stop reason: HOSPADM

## 2021-06-02 RX ORDER — SODIUM CHLORIDE 0.9 % (FLUSH) 0.9 %
5-40 SYRINGE (ML) INJECTION EVERY 12 HOURS SCHEDULED
Status: DISCONTINUED | OUTPATIENT
Start: 2021-06-02 | End: 2021-06-09 | Stop reason: HOSPADM

## 2021-06-02 RX ORDER — ACETAMINOPHEN 650 MG/1
650 SUPPOSITORY RECTAL EVERY 6 HOURS PRN
Status: DISCONTINUED | OUTPATIENT
Start: 2021-06-02 | End: 2021-06-09 | Stop reason: HOSPADM

## 2021-06-02 RX ORDER — AZITHROMYCIN 250 MG/1
500 TABLET, FILM COATED ORAL EVERY 24 HOURS
Status: DISCONTINUED | OUTPATIENT
Start: 2021-06-02 | End: 2021-06-02 | Stop reason: SDUPTHER

## 2021-06-02 RX ORDER — PAROXETINE HYDROCHLORIDE 20 MG/1
40 TABLET, FILM COATED ORAL EVERY MORNING
Status: DISCONTINUED | OUTPATIENT
Start: 2021-06-03 | End: 2021-06-09 | Stop reason: HOSPADM

## 2021-06-02 RX ORDER — ONDANSETRON 4 MG/1
4 TABLET, ORALLY DISINTEGRATING ORAL EVERY 8 HOURS PRN
Status: DISCONTINUED | OUTPATIENT
Start: 2021-06-02 | End: 2021-06-09 | Stop reason: HOSPADM

## 2021-06-02 RX ORDER — ACETAMINOPHEN 325 MG/1
650 TABLET ORAL EVERY 6 HOURS PRN
Status: DISCONTINUED | OUTPATIENT
Start: 2021-06-02 | End: 2021-06-09 | Stop reason: HOSPADM

## 2021-06-02 RX ORDER — ALBUTEROL SULFATE 90 UG/1
4 AEROSOL, METERED RESPIRATORY (INHALATION) ONCE
Status: COMPLETED | OUTPATIENT
Start: 2021-06-02 | End: 2021-06-02

## 2021-06-02 RX ORDER — 0.9 % SODIUM CHLORIDE 0.9 %
500 INTRAVENOUS SOLUTION INTRAVENOUS ONCE
Status: COMPLETED | OUTPATIENT
Start: 2021-06-02 | End: 2021-06-02

## 2021-06-02 RX ORDER — AMLODIPINE BESYLATE 10 MG/1
10 TABLET ORAL EVERY MORNING
Status: DISCONTINUED | OUTPATIENT
Start: 2021-06-03 | End: 2021-06-09 | Stop reason: HOSPADM

## 2021-06-02 RX ADMIN — ACETAMINOPHEN 1000 MG: 500 TABLET, FILM COATED ORAL at 00:49

## 2021-06-02 RX ADMIN — ENOXAPARIN SODIUM 40 MG: 40 INJECTION SUBCUTANEOUS at 14:58

## 2021-06-02 RX ADMIN — CEFTRIAXONE SODIUM 1000 MG: 1 INJECTION, POWDER, FOR SOLUTION INTRAMUSCULAR; INTRAVENOUS at 04:34

## 2021-06-02 RX ADMIN — LEVETIRACETAM 500 MG: 500 TABLET, FILM COATED ORAL at 14:57

## 2021-06-02 RX ADMIN — IOPAMIDOL 100 ML: 755 INJECTION, SOLUTION INTRAVENOUS at 03:29

## 2021-06-02 RX ADMIN — ALBUTEROL SULFATE 4 PUFF: 90 AEROSOL, METERED RESPIRATORY (INHALATION) at 03:40

## 2021-06-02 RX ADMIN — SODIUM CHLORIDE 500 ML: 9 INJECTION, SOLUTION INTRAVENOUS at 04:41

## 2021-06-02 RX ADMIN — GUAIFENESIN 600 MG: 600 TABLET, EXTENDED RELEASE ORAL at 00:49

## 2021-06-02 RX ADMIN — BENZONATATE 100 MG: 100 CAPSULE ORAL at 00:49

## 2021-06-02 RX ADMIN — PANTOPRAZOLE SODIUM 40 MG: 40 TABLET, DELAYED RELEASE ORAL at 18:40

## 2021-06-02 RX ADMIN — LEVOTHYROXINE SODIUM 25 MCG: 25 TABLET ORAL at 14:57

## 2021-06-02 RX ADMIN — AZITHROMYCIN MONOHYDRATE 500 MG: 500 INJECTION, POWDER, LYOPHILIZED, FOR SOLUTION INTRAVENOUS at 05:50

## 2021-06-02 RX ADMIN — SODIUM CHLORIDE, PRESERVATIVE FREE 10 ML: 5 INJECTION INTRAVENOUS at 21:44

## 2021-06-02 RX ADMIN — LEVETIRACETAM 500 MG: 500 TABLET, FILM COATED ORAL at 21:44

## 2021-06-02 ASSESSMENT — PAIN SCALES - GENERAL
PAINLEVEL_OUTOF10: 0

## 2021-06-02 NOTE — ED TRIAGE NOTES
Pt presents to the ED via EMS with complaints of cough, nasal congestion, and pharyngitis. Pt states this has been going on for 3 days but worse today. Pt states he is unable to cough anything up, feels like its in his throat.

## 2021-06-02 NOTE — PROGRESS NOTES
Patient seen and examined at bedside. Mild distress on 5L NC. Denies any chest pains or fevers/chills. Will continue breathing treatments and abx today. Appreciate pulmonology recs.

## 2021-06-02 NOTE — PROGRESS NOTES
Physician Progress Note      Jean Gandara  CSN #:                  [id number]  :                       1965  ADMIT DATE:       2021 10:22 PM  [address] [place] DATE:  RESPONDING  PROVIDER #:        Emily Boston MD          QUERY TEXT:    Dear Hospitalist,    Pt admitted with PNA. Pt noted to have fever, tachycardia, tachypnea and acute   hypoxia. If possible, please document in the progress notes and discharge   summary if you are evaluating and /or treating any of the following: The medical record reflects the following:  Risk Factors: PNA, DM, CP, respiratory failure  Clinical Indicators: T 100.7 - 97.1,  P 116 - 86,  R 30 - 20,  Alex Catragena PA-c in ED provider notes \"Patient is hypoxic at 88% on room air. At 3 L of   oxygen per minute via nasal cannula his oxygen saturation is at 92%. \"  CTA   pulmonary on  \"Multifocal pneumonia. \", Lactic acid 1.5, WBC 8  Treatment: CXR, CTA pulmonary, lactic acid, CBC, 500 ml 0.9% NS bolus, IV   Zithromax and Rocephin, O2    Thank you,  RUY EllsworthN, RN, CDS  [phone]  Options provided:  -- Sepsis, present on admission  -- No Sepsis, pneumonia only  -- Other - I will add my own diagnosis  -- Disagree - Not applicable / Not valid  -- Disagree - Clinically unable to determine / Unknown  -- Refer to Clinical Documentation Reviewer    PROVIDER RESPONSE TEXT:    This patient has sepsis which was present on admission.     Query created by: Arpita Tolbert on 2021 8:16 AM      Electronically signed by:  Emily Boston MD 2021 10:27 AM

## 2021-06-02 NOTE — ED PROVIDER NOTES
Josefina VASQUEZ Johnson 89 ENCOUNTER        PCP: Noble Rodríguez MD    [facility]    Chief Complaint   Patient presents with    Cough    Nasal Congestion    Pharyngitis       This patient was not evaluated by the attending physician. I have independently evaluated this patient. My supervising physician was available for consultation. HPI      Floyd Putnam is a 63 y.o. male with PMH of cerebral palsy, seizure disorder who presents with nonproductive cough cough, nasal congestion, mild sore throat. Onset was about 3 days ago. Duration that has been mostly constant since the onset. Patient reports sore throat worsens with coughing. Patient has not been taking medication for symptoms. Patient reports mild left upper abdominal pain with coughing only. Pain does not radiate. Patient denies any recent sick contacts. Patient has been vaccinated against COVID-19 with second dosage of COVID-19 vaccine more than 2 weeks ago. Patient does not use oxygen at home. Patient denies having asthma or COPD. Patient denies chest pain, shortness of breath, wheezing, hemoptysis, nausea, vomiting. REVIEW OF SYSTEMS    Constitutional:  Denies fever, chills. HENT:  Denies sore throat or ear pain   Cardiovascular:  Denies chest pain, palpitations, syncope, extremity edema.   Respiratory: See HPI    GI:  + abdominal pain; Denies nausea, vomiting, diarrhea  :  Denies any urinary symptoms  Musculoskeletal:  Denies back pain, extremity swelling  Skin:  Denies rash  Neurologic:  Denies lightheadedness, dizziness, headache, focal weakness or sensory changes   Endocrine:  Denies polyuria or polydypsia   Lymphatic:  Denies swollen glands     All other review of systems are negative  See HPI and nursing notes for additional information         PAST MEDICAL & SURGICAL HISTORY    Past Medical History:   Diagnosis Date    [facility] [address]     Depression     Hypertension     Seizures ([facility] [address]      Past Surgical History:   Procedure Laterality Date    ABDOMEN SURGERY      EYE SURGERY      UPPER GASTROINTESTINAL ENDOSCOPY N/A 12/3/2019    EGD BIOPSY performed by Harika Coffey MD at [address] [place] [address] Drive    Current Outpatient Rx   Medication Sig Dispense Refill    dicyclomine (BENTYL) 10 MG capsule Take 1 capsule by mouth 3 times daily As needed for abdominal pain 15 capsule 0    pantoprazole (PROTONIX) 40 MG tablet Take 1 tablet by mouth 2 times daily (before meals) 60 tablet 0    levothyroxine (SYNTHROID) 25 MCG tablet Take 25 mcg by mouth Daily      acetaminophen (AMINOFEN) 325 MG tablet Take 2 tablets by mouth every 6 hours as needed for Pain 30 tablet 0    levETIRAcetam (KEPPRA) 500 MG tablet Take 1 tablet by mouth 2 times daily 60 tablet 0    PARoxetine (PAXIL) 40 MG tablet Take 1 tablet by mouth every morning 30 tablet 0    amLODIPine (NORVASC) 10 MG tablet Take 1 tablet by mouth every morning 30 tablet 0       ALLERGIES    No Known Allergies    SOCIAL & FAMILY HISTORY    Social History     Socioeconomic History    Marital status: Single     Spouse name: Not on file    Number of children: Not on file    Years of education: Not on file    Highest education level: Not on file   Occupational History    Not on file   Tobacco Use    Smoking status: Never Smoker    Smokeless tobacco: Never Used   Substance and Sexual Activity    Alcohol use: No    Drug use: No    Sexual activity: Not on file   Other Topics Concern    Not on file   Social History Narrative    Not on file     Social Determinants of Health     Financial Resource Strain:     Difficulty of Paying Living Expenses:    Food Insecurity:     Worried About Running Out of Food in the Last Year:     Ran Out of Food in the Last Year:    Transportation Needs:     Lack of Transportation (Medical):      Lack of Transportation (Non-Medical):    Physical Activity:     Days of Exercise per Week:     Minutes of Exercise per Session: Stress:     Feeling of Stress :    Social Connections:     Frequency of Communication with Friends and Family:     Frequency of Social Gatherings with Friends and Family:     Attends Ten[employer] Services:     Active Member of Clubs or Organizations:     Attends Club or Organization Meetings:     Marital Status:    Intimate Partner Violence:     Fear of Current or Ex-Partner:     Emotionally Abused:     Physically Abused:     Sexually Abused:      No family history on file. PHYSICAL EXAM    VITAL SIGNS: BP (!) 149/86   Pulse 108   Temp 100.7 °F (38.2 °C) (Oral)   Resp 20   Ht 5' 7\" (1.702 m)   Wt 168 lb (76.2 kg)   SpO2 95%   BMI 26.31 kg/m²    Constitutional:  Well developed, well nourished, no acute distress but is hypoxic requiring supplemental oxygen. HENT:  Atraumatic, moist mucus membranes. Posterior oropharynx without edema. Uvula is midline and rises evenly with phonation. No tonsillar hypertrophy or exudates. No hot potato voice. Neck/Lymphatics: supple, no JVD  Respiratory:  Lungs clear without wheezing, rhonchi, rales. No retractions. No accessory muscle usage. Patient is hypoxic at 88% on room air. At 3 L of oxygen per minute via nasal cannula his oxygen saturation is at 92%. Cardiovascular:  Rate tachycardic, regular Rhythm,  no murmurs/rubs/gallops. Vascular: Radial pulses and DP pulses 2+ equal bilaterally  GI:  Soft, nontender, normal bowel sounds. No guarding or rigidity. No distention. No discoloration. Musculoskeletal:  No edema, no acute deformities. No thigh/calf tenderness to palpation bilaterally. Compartments are soft in bilateral lower extremities. Integument:  Skin warm and dry, no petechiae. Neurologic:  Alert & oriented, normal speech.   Psych: Pleasant affect, no hallucinations        LABS:  Results for orders placed or performed during the hospital encounter of 06/01/21   Strep Screen Group A Throat    Specimen: Throat   Result Value Ref Range Specimen THROAT     Special Requests NONE     Strep A Direct Screen NEGATIVE    COVID-19, Rapid    Specimen: Nasopharyngeal   Result Value Ref Range    Source THROAT     SARS-CoV-2, NAAT NOT DETECTED NOT DETECTED   CBC Auto Differential   Result Value Ref Range    WBC 8.0 4.0 - 10.5 K/CU MM    RBC 4.69 4.6 - 6.2 M/CU MM    Hemoglobin 14.8 13.5 - 18.0 GM/DL    Hematocrit 43.2 42 - 52 %    MCV 92.1 78 - 100 FL    MCH 31.6 (H) 27 - 31 PG    MCHC 34.3 32.0 - 36.0 %    RDW 12.0 11.7 - 14.9 %    Platelets 851 084 - 361 K/CU MM    MPV 10.1 7.5 - 11.1 FL    Differential Type AUTOMATED DIFFERENTIAL     Segs Relative 61.3 36 - 66 %    Lymphocytes % 17.8 (L) 24 - 44 %    Monocytes % 17.8 (H) 0 - 4 %    Eosinophils % 2.0 0 - 3 %    Basophils % 0.6 0 - 1 %    Segs Absolute 4.9 K/CU MM    Lymphocytes Absolute 1.4 K/CU MM    Monocytes Absolute 1.4 K/CU MM    Eosinophils Absolute 0.2 K/CU MM    Basophils Absolute 0.1 K/CU MM    Nucleated RBC % 0.0 %    Total Nucleated RBC 0.0 K/CU MM    Total Immature Neutrophil 0.04 K/CU MM    Immature Neutrophil % 0.5 (H) 0 - 0.43 %   Comprehensive Metabolic Panel   Result Value Ref Range    Sodium 135 135 - 145 MMOL/L    Potassium 3.7 3.5 - 5.1 MMOL/L    Chloride 100 99 - 110 mMol/L    CO2 25 21 - 32 MMOL/L    BUN 13 6 - 23 MG/DL    CREATININE 1.0 0.9 - 1.3 MG/DL    Glucose 117 (H) 70 - 99 MG/DL    Calcium 8.4 8.3 - 10.6 MG/DL    Albumin 4.1 3.4 - 5.0 GM/DL    Total Protein 6.5 6.4 - 8.2 GM/DL    Total Bilirubin 1.0 0.0 - 1.0 MG/DL    ALT 39 10 - 40 U/L    AST 25 15 - 37 IU/L    Alkaline Phosphatase 72 40 - 129 IU/L    GFR Non-African American >60 >60 mL/min/1.73m2    GFR African American >60 >60 mL/min/1.73m2    Anion Gap 10 4 - 16   Brain Natriuretic Peptide   Result Value Ref Range    Pro-BNP 50.93 <300 PG/ML   EKG 12 Lead   Result Value Ref Range    Ventricular Rate 106 BPM    Atrial Rate 106 BPM    P-R Interval 132 ms    QRS Duration 82 ms    Q-T Interval 330 ms    QTc Calculation (Bazett) 438 ms    P Axis 27 degrees    R Axis 25 degrees    T Axis 28 degrees    Diagnosis       Sinus tachycardia  Possible Left atrial enlargement  Borderline ECG  When compared with ECG of 03-AUG-2020 23:23,  No significant change was found           EKG: EKG Interpretation  Please see ED physician's note for EKG interpretation- Dr. Chavez Naknek    RADIOLOGY/PROCEDURES    CTA PULMONARY W CONTRAST   Preliminary Result   1. No pulmonary embolus to the lobar level. Further evaluation is limited by   bolus quality and patient motion. 2. Multifocal pneumonia. 3. Hepatic steatosis. 4. Hiatal hernia. CT ABDOMEN PELVIS W IV CONTRAST Additional Contrast? None   Preliminary Result   1. No acute intra-abdominal abnormality. 2. Hepatic steatosis. 3. Basilar opacities, right greater than left. Please refer to dedicated CT   of the chest for further evaluation. 4. Hiatal hernia. XR CHEST PORTABLE   Preliminary Result   1. Mild prominence of the perihilar vasculature without overt congestive   heart failure. 2. Elevation of the right hemidiaphragm. ED COURSE & MEDICAL DECISION MAKING       Vital signs and nursing notes reviewed during ED course. I have independently evaluated this patient. Supervising physician present in the Emergency Department, available for consultation, throughout entirety of patient care. Patient presents as above. Patient placed on telemetry monitoring as well as continuous pulse oximetry monitoring. While in the ED today, labs, imaging, and EKG were obtained. For EKG interpretation- please see ED physician's note. Labs reveal rapid strep test be negative. Throat culture in process. BNP normal.  No leukocytosis or leukopenia. Rapid COVID-19 test is negative. Chest xray obtained today and reveals mild prominence of the perihilar vasculature without overt CHF. There is elevation of right josiah diaphragm per radiologist read.   As no apparent source of infection is identified at this time did obtain CTA pulmonary with contrast and CT abdomen and pelvis to further evaluate for PE, occult pneumonia, or intra-abdominal infection given left upper abdominal pain with coughing. Imaging significant for multifocal pneumonia as well as hepatic static ptosis and hiatal hernia. No acute intra-abdominal abnormality present. No evidence of PE to the lobar level. Patient started on IV azithromycin and IV ceftriaxone for pneumonia. Patient is requiring supplemental oxygen via nasal cannula for hypoxia. Lactic acid is normal.  Blood cultures obtained. Did trial albuterol MDIs. Patient also treated with cough medications in the ED and Tylenol with resolution of fever. Given patient's hypoxia and multifocal pneumonia- I consulted with hospitalist and we discussed the patient's case. Hospitalist, Dr. Abebe Villagran, agrees to admit the patient at this time for further evaluation and care. All pertinent Lab data and radiographic results reviewed with patient at bedside. The patient and/or the family were informed of the results of any tests/labs/imaging, the treatment plan, and time was allotted to answer questions. Diagnosis, disposition, and treatment plan reviewed in detail with patient who understands and agrees to admission at this time. See chart for details of medications given during the ED stay. In consideration of current COVID19 pandemic, with effort to minimize unnecessary provider exposure, this patient was seen at bedside by me independently. However, in compliance with current hospital [facility]/ED protocol, prior to admission I did discuss this patient case with emergency department physician, Dr. Beverley Aguilar. CRITICAL CARE NOTE:  There was a high probability of clinically significant life-threatening deterioration of the patient's condition requiring my urgent intervention due to hypoxia.   Supplemental oxygen via nasal cannula being titrated upward, albuterol metered-dose inhaler was performed to address this. Total critical care time is  31 minutes. This includes vital sign monitoring, pulse oximetry monitoring, telemetry monitoring, clinical response to the IV medications, reviewing the nursing notes, consultation time, dictation/documentation time, and interpretation of the lab work. This time excludes time spent performing procedures and separately billable procedures and family discussion time. Clinical  IMPRESSION    1. Multifocal pneumonia    2. Hypoxia    3. Hepatic steatosis    4. Hiatal hernia        PLAN:  Admission to the hospital    Comment: Please note this report has been produced using speech recognition software and may contain errors related to that system including errors in grammar, punctuation, and spelling, as well as words and phrases that may be inappropriate. If there are any questions or concerns please feel free to contact the dictating provider for clarification.        Alex Munguia PA-C  06/02/21 0351

## 2021-06-02 NOTE — H&P
HISTORY AND PHYSICAL  (Hospitalist, Internal Medicine)  IDENTIFYING INFORMATION   PATIENT:  Regulo Dean  MRN:  [id number]  ADMIT DATE: 6/1/2021  TIME OF EVALUATION: 6/2/2021 4:32 AM    CHIEF COMPLAINT     Cough and shortness of breath  HISTORY OF PRESENT ILLNESS   Regulo Dean is a 63 y.o. male admitted for shortness of breath shortness of breath has been going on for couple days, patient states that he has cough, and productive phlegm. Patient states that he has had pneumonia in the past, however his breathing has been much worse this time. Patient states that he does not use oxygen at home, lives in a group home because of the cerebral palsy. Denies any associated chest pain with the shortness of breath, has no nausea, vomiting, diarrhea, open wounds or rashes. Patient states that he has no urinary troubles either. He does complain of fevers, and has some chills. PMH listed below:    PAST MEDICAL, SURGICAL, FAMILY, and SOCIAL HISTORY     Past Medical History:   Diagnosis Date    Cerebral palsy ([facility] [address]     Depression     Hypertension     Seizures ([facility] [address]      Past Surgical History:   Procedure Laterality Date    ABDOMEN SURGERY      EYE SURGERY      UPPER GASTROINTESTINAL ENDOSCOPY N/A 12/3/2019    [facility]OPSY [facility] at [facility]     No family history on file.   Family Hx of HTN  Family Hx as reviewed above, otherwise non-contributory  Social History     Socioeconomic History    Marital status: Single     Spouse name: Not on file    Number of children: Not on file    Years of education: Not on file    Highest education level: Not on file   Occupational History    Not on file   Tobacco Use    Smoking status: Never Smoker    Smokeless tobacco: Never Used   Substance and Sexual Activity    Alcohol use: No    Drug use: No    Sexual activity: Not on file   Other Topics Concern    Not on file   Social History Narrative    Not on file     Social Determinants of Health     Financial Resource Strain:     Difficulty of Paying Living Expenses:    Food Insecurity:     Worried About [address] in the Last Year:     [address] in the Last Year:    Transportation Needs:     Lack of Transportation (Medical):  Lack of Transportation (Non-Medical):    Physical Activity:     Days of Exercise per Week:     Minutes of Exercise per Session:    Stress:     Feeling of Stress :    Social Connections:     Frequency of Communication with Friends and Family:     Frequency of Social Gatherings with Friends and Family:     Attends Nondenominational Services:     Active Member of Clubs or Organizations:     Attends Club or Organization Meetings:     Marital Status:    Intimate Partner Violence:     Fear of Current or Ex-Partner:     Emotionally Abused:     Physically Abused:     Sexually Abused:        MEDICATIONS   Medications Prior to Admission  Not in a hospital admission.     Current Medications  Current Facility-Administered Medications   Medication Dose Route Frequency Provider Last Rate Last Admin    sodium chloride flush 0.9 % injection 5-40 mL  5-40 mL Intravenous PRN Moiz Sterling PA-C        0.9 % sodium chloride bolus  500 mL Intravenous Once Moiz Sterling PA-C        cefTRIAXone (ROCEPHIN) 1000 mg IVPB in 50 mL D5W minibag  1,000 mg Intravenous Once Moiz Sterling PA-C        azithromycin (ZITHROMAX) 500 mg in dextrose 5 % 250 mL IVPB  500 mg Intravenous Once Moiz Sterling PA-C         Current Outpatient Medications   Medication Sig Dispense Refill    dicyclomine (BENTYL) 10 MG capsule Take 1 capsule by mouth 3 times daily As needed for abdominal pain 15 capsule 0    pantoprazole (PROTONIX) 40 MG tablet Take 1 tablet by mouth 2 times daily (before meals) 60 tablet 0    levothyroxine (SYNTHROID) 25 MCG tablet Take 25 mcg by mouth Daily      acetaminophen (AMINOFEN) 325 MG tablet Take 2 tablets by mouth every 6 hours as needed for Pain 30 tablet 0    levETIRAcetam (KEPPRA) 500 MG tablet Take 1 tablet by mouth 2 times daily 60 tablet 0    PARoxetine (PAXIL) 40 MG tablet Take 1 tablet by mouth every morning 30 tablet 0    amLODIPine (NORVASC) 10 MG tablet Take 1 tablet by mouth every morning 30 tablet 0         Allergies  No Known Allergies    REVIEW OF SYSTEMS   Within above limitations. 14 point review of systems reviewed. Pertinent positive or negative as per HPI or otherwise negative per 14 point systems review. PHYSICAL EXAM     Wt Readings from Last 3 Encounters:   06/01/21 168 lb (76.2 kg)   08/25/20 168 lb (76.2 kg)   08/03/20 168 lb (76.2 kg)       Blood pressure 121/79, pulse 92, temperature 98.7 °F (37.1 °C), temperature source Oral, resp. rate 23, height 5' 7\" (1.702 m), weight 168 lb (76.2 kg), SpO2 90 %. General - AAO x 3  Psych - Appropriate affect/speech. No agitation  Eyes - Eye lids intact. No scleral icterus  ENT - Lips wnl. External ear clear/dry/intact. No thyromegaly on inspection  Neuro - No gross peripheral or central neuro deficits on inspection  Heart - Sinus. RRR. S1 and S2 present. No added HS/murmurs appreciated. No elevated JVD appreciated  Lung - Adequate air entry b/l, rhonchus, no wheezing  GI - Soft. No guarding/rigidity. No hepatosplenomegaly/ascites. BS+   - No CVA/suprapubic tenderness or palpable bladder distension  Skin - Intact. No rash/petechiae/ecchymosis. Warm extremities  MSK - Joints with normal ROM.  No joint swellings    Lines/Drains/Airways/Wounds:  [unfilled]    LABS AND IMAGING   CBC  [unfilled]    Last 3 Hemoglobin  Lab Results   Component Value Date    HGB 14.8 06/01/2021    HGB 15.8 08/25/2020    HGB 16.4 08/03/2020     Last 3 WBC/ANC  Lab Results   Component Value Date    WBC 8.0 06/01/2021    WBC 8.6 08/25/2020    WBC 9.1 08/03/2020     No components found for: GRNLOCTYABS  Last 3 Platelets  No results found for: PLATELET  Chemistry  [unfilled]  [unfilled]  No results found for: LDH  Coagulation Studies  Lab Results   Component Value Date    INR 1.13 12/02/2019     Liver Function Studies  Lab Results   Component Value Date    ALT 39 06/01/2021    AST 25 06/01/2021    ALKPHOS 72 06/01/2021       Recent Imaging        Relevant labs and imaging reviewed    ASSESSMENT AND PLAN   Kermit Mccoy is a 63 y.o. male p/w    Acute hypoxic respiratory failure likely d/t Community-acquired pneumonia  -Unlikely PE  Possibly emphysematous disease  - CXR with infiltrates  - ceftriaxone and azithromycin   - sputum cuture, , legionella antigen, strep pneumonia antigen  - flu swab  - PRN O2 via NC  - pulmonary consult if needed    Type 2 diabetes- uncontrolled  ISS, TIDAC finger stick  Hypoglycemic protocol  F/u A1c      Possible emphysematous disease  Continue duo nebs  Pulmonary consult  Continuous pulse ox    New diagnosis hepatic steatosis  Consider GI consult    Hypertension-labile, uncontrolled  -Patient is on Norvasc    Depression-patient is on Paxil.     Seizure disorder  Keppra 500 mg twice daily p.o. twice daily    History of cerebral palsy    Case d/w ED provider    DVT ppx: Lovenox  Code status: Full code    [facility], Internal Medicine  6/2/2021 at 4:32 AM

## 2021-06-03 LAB
ALBUMIN SERPL-MCNC: 4.1 GM/DL (ref 3.4–5)
ALP BLD-CCNC: 77 IU/L (ref 40–129)
ALT SERPL-CCNC: 32 U/L (ref 10–40)
ANION GAP SERPL CALCULATED.3IONS-SCNC: 13 MMOL/L (ref 4–16)
AST SERPL-CCNC: 21 IU/L (ref 15–37)
BASOPHILS ABSOLUTE: 0.1 K/CU MM
BASOPHILS RELATIVE PERCENT: 0.6 % (ref 0–1)
BILIRUB SERPL-MCNC: 1.1 MG/DL (ref 0–1)
BUN BLDV-MCNC: 11 MG/DL (ref 6–23)
CALCIUM SERPL-MCNC: 8.4 MG/DL (ref 8.3–10.6)
CHLORIDE BLD-SCNC: 98 MMOL/L (ref 99–110)
CO2: 25 MMOL/L (ref 21–32)
CREAT SERPL-MCNC: 0.8 MG/DL (ref 0.9–1.3)
DIFFERENTIAL TYPE: ABNORMAL
EOSINOPHILS ABSOLUTE: 0.5 K/CU MM
EOSINOPHILS RELATIVE PERCENT: 5.1 % (ref 0–3)
GFR AFRICAN AMERICAN: >60 ML/MIN/1.73M2
GFR NON-AFRICAN AMERICAN: >60 ML/MIN/1.73M2
GLUCOSE BLD-MCNC: 97 MG/DL (ref 70–99)
HCT VFR BLD CALC: 44.2 % (ref 42–52)
HEMOGLOBIN: 15.4 GM/DL (ref 13.5–18)
HIGH SENSITIVE C-REACTIVE PROTEIN: 89.9 MG/L
IMMATURE NEUTROPHIL %: 0.3 % (ref 0–0.43)
LYMPHOCYTES ABSOLUTE: 1.8 K/CU MM
LYMPHOCYTES RELATIVE PERCENT: 19.6 % (ref 24–44)
MCH RBC QN AUTO: 30.7 PG (ref 27–31)
MCHC RBC AUTO-ENTMCNC: 34.8 % (ref 32–36)
MCV RBC AUTO: 88.2 FL (ref 78–100)
MONOCYTES ABSOLUTE: 1.6 K/CU MM
MONOCYTES RELATIVE PERCENT: 17.4 % (ref 0–4)
NUCLEATED RBC %: 0 %
PDW BLD-RTO: 11.7 % (ref 11.7–14.9)
PLATELET # BLD: 166 K/CU MM (ref 140–440)
PMV BLD AUTO: 9.9 FL (ref 7.5–11.1)
POTASSIUM SERPL-SCNC: 3.6 MMOL/L (ref 3.5–5.1)
PROCALCITONIN: 0.21
RBC # BLD: 5.01 M/CU MM (ref 4.6–6.2)
SEGMENTED NEUTROPHILS ABSOLUTE COUNT: 5.1 K/CU MM
SEGMENTED NEUTROPHILS RELATIVE PERCENT: 57 % (ref 36–66)
SODIUM BLD-SCNC: 136 MMOL/L (ref 135–145)
TOTAL IMMATURE NEUTOROPHIL: 0.03 K/CU MM
TOTAL NUCLEATED RBC: 0 K/CU MM
TOTAL PROTEIN: 6.5 GM/DL (ref 6.4–8.2)
WBC # BLD: 9 K/CU MM (ref 4–10.5)

## 2021-06-03 PROCEDURE — 80053 COMPREHEN METABOLIC PANEL: CPT

## 2021-06-03 PROCEDURE — 94664 DEMO&/EVAL PT USE INHALER: CPT

## 2021-06-03 PROCEDURE — 84145 PROCALCITONIN (PCT): CPT

## 2021-06-03 PROCEDURE — 94640 AIRWAY INHALATION TREATMENT: CPT

## 2021-06-03 PROCEDURE — 87449 NOS EACH ORGANISM AG IA: CPT

## 2021-06-03 PROCEDURE — 2700000000 HC OXYGEN THERAPY PER DAY

## 2021-06-03 PROCEDURE — 87070 CULTURE OTHR SPECIMN AEROBIC: CPT

## 2021-06-03 PROCEDURE — 36415 COLL VENOUS BLD VENIPUNCTURE: CPT

## 2021-06-03 PROCEDURE — 2580000003 HC RX 258: Performed by: INTERNAL MEDICINE

## 2021-06-03 PROCEDURE — 1200000000 HC SEMI PRIVATE

## 2021-06-03 PROCEDURE — 6360000002 HC RX W HCPCS: Performed by: INTERNAL MEDICINE

## 2021-06-03 PROCEDURE — 6370000000 HC RX 637 (ALT 250 FOR IP): Performed by: INTERNAL MEDICINE

## 2021-06-03 PROCEDURE — 86141 C-REACTIVE PROTEIN HS: CPT

## 2021-06-03 PROCEDURE — 87899 AGENT NOS ASSAY W/OPTIC: CPT

## 2021-06-03 PROCEDURE — 87205 SMEAR GRAM STAIN: CPT

## 2021-06-03 PROCEDURE — 94761 N-INVAS EAR/PLS OXIMETRY MLT: CPT

## 2021-06-03 PROCEDURE — 85025 COMPLETE CBC W/AUTO DIFF WBC: CPT

## 2021-06-03 RX ORDER — IPRATROPIUM BROMIDE AND ALBUTEROL SULFATE 2.5; .5 MG/3ML; MG/3ML
1 SOLUTION RESPIRATORY (INHALATION)
Status: DISCONTINUED | OUTPATIENT
Start: 2021-06-03 | End: 2021-06-05

## 2021-06-03 RX ORDER — GUAIFENESIN 600 MG/1
600 TABLET, EXTENDED RELEASE ORAL 2 TIMES DAILY
Status: DISCONTINUED | OUTPATIENT
Start: 2021-06-03 | End: 2021-06-09 | Stop reason: HOSPADM

## 2021-06-03 RX ADMIN — LEVOTHYROXINE SODIUM 25 MCG: 25 TABLET ORAL at 05:04

## 2021-06-03 RX ADMIN — IPRATROPIUM BROMIDE AND ALBUTEROL SULFATE 1 AMPULE: 2.5; .5 SOLUTION RESPIRATORY (INHALATION) at 19:38

## 2021-06-03 RX ADMIN — AMLODIPINE BESYLATE 10 MG: 10 TABLET ORAL at 09:21

## 2021-06-03 RX ADMIN — IPRATROPIUM BROMIDE AND ALBUTEROL SULFATE 1 AMPULE: 2.5; .5 SOLUTION RESPIRATORY (INHALATION) at 12:54

## 2021-06-03 RX ADMIN — GUAIFENESIN 600 MG: 600 TABLET, EXTENDED RELEASE ORAL at 20:52

## 2021-06-03 RX ADMIN — LEVETIRACETAM 500 MG: 500 TABLET, FILM COATED ORAL at 20:52

## 2021-06-03 RX ADMIN — SODIUM CHLORIDE, PRESERVATIVE FREE 10 ML: 5 INJECTION INTRAVENOUS at 20:51

## 2021-06-03 RX ADMIN — PANTOPRAZOLE SODIUM 40 MG: 40 TABLET, DELAYED RELEASE ORAL at 05:04

## 2021-06-03 RX ADMIN — LEVETIRACETAM 500 MG: 500 TABLET, FILM COATED ORAL at 09:21

## 2021-06-03 RX ADMIN — PAROXETINE HYDROCHLORIDE 40 MG: 20 TABLET, FILM COATED ORAL at 09:21

## 2021-06-03 RX ADMIN — GUAIFENESIN 600 MG: 600 TABLET, EXTENDED RELEASE ORAL at 11:37

## 2021-06-03 RX ADMIN — CEFTRIAXONE 1000 MG: 1 INJECTION, POWDER, FOR SOLUTION INTRAMUSCULAR; INTRAVENOUS at 03:55

## 2021-06-03 RX ADMIN — ENOXAPARIN SODIUM 40 MG: 40 INJECTION SUBCUTANEOUS at 09:20

## 2021-06-03 RX ADMIN — PANTOPRAZOLE SODIUM 40 MG: 40 TABLET, DELAYED RELEASE ORAL at 15:58

## 2021-06-03 RX ADMIN — AZITHROMYCIN DIHYDRATE 500 MG: 500 INJECTION, POWDER, LYOPHILIZED, FOR SOLUTION INTRAVENOUS at 05:04

## 2021-06-03 ASSESSMENT — PAIN SCALES - GENERAL
PAINLEVEL_OUTOF10: 0

## 2021-06-03 NOTE — CONSULTS
Per the physical rehabilitation triage process, the PT referral will be held at this time. Recommend nursing mobility as patient tolerates. Reviewed RNCM note: patient with stable d/c plan and support within group home. No current needs for acute care PT service.   Marti Juarez, PT,   6/3/2021, 4:23 PM

## 2021-06-03 NOTE — PROGRESS NOTES
Hospitalist Progress Note      Name:  Mat Saunders /Age/Sex: 1965  (63 y.o. male)   MRN & CSN:  [id number] & [id number] Admission Date/Time: 2021 10:22 PM   Location:  -A PCP: Dixon Sauceda MD         Hospital Day: 3    Assessment and Plan:   Mat Saunders is a 63 y.o. male p/w acute hypoxemic respiratory failure     Acute hypoxic respiratory failure  Community Acquired Pneumonia  Parainfluenza Infection  - Continue Azithromycin/Ceftriaxone  - Continue breathing treatments PRN  - Trend inflammatory markers  - f/u sputum cultures and urine leginella/strep  - Symptomatic treatment PRN    Type 2 diabetes- uncontrolled  - ISS, TIDAC finger stick  - Hypoglycemic protocol     Possible emphysematous disease  - Continue inhaler PRN  - Pulmonology following, appreciate recs     New diagnosis hepatic steatosis  - LFTS wnl, f/u outpatient     Hypertension-labile, uncontrolled  -Patient is on Norvasc     Depression-patient is on Paxil.     Seizure disorder  Keppra 500 mg twice daily p.o. twice daily     History of cerebral palsy    Diet ADULT DIET; Regular   DVT Prophylaxis [] Lovenox, []  Heparin, [] SCDs, [] Ambulation   GI Prophylaxis [] PPI,  [] H2 Blocker,  [] Carafate,  [] Diet/Tube Feeds   Code Status Full Code   Disposition Patient requires continued admission due to    MDM [] Low, [] Moderate,[]  High  Patient's risk as above due t     History of Present Illness:     States his breathing is about the same but no worse; denies any chills. Does have cough    Objective: Intake/Output Summary (Last 24 hours) at 6/3/2021 1153  Last data filed at 6/3/2021 0605  Gross per 24 hour   Intake --   Output 425 ml   Net -425 ml      Vitals:   Vitals:    21 0933   BP:    Pulse:    Resp: 17   Temp:    SpO2: 93%     Physical Exam:   GEN Awake male, lying in bed; no distress  EYES Pupils are equally round. No scleral erythema, discharge, or conjunctivitis.   NECK Supple, no apparent

## 2021-06-03 NOTE — CARE COORDINATION
Chart reviewed. Pt has history of cerebral palsy and is from group home setting. CM attempted to call pt room phone, however, rings busy. CM then called pts caregiver supervisor - Isabela Segovia. Isabela Segovia confirmed that pt is from group home. Per Isabela Segovia pt has no DME In the home currently. ARUNA asked if the group home would be able to accommodate oxygen for the pt if needed at discharge and she stated they would. Isabela Segovia stated that the group home will be able to provide transport at discharge. Discharging RN should call [phone] to arrange discharge with pts group home.

## 2021-06-04 LAB
ALBUMIN SERPL-MCNC: 3.7 GM/DL (ref 3.4–5)
ALP BLD-CCNC: 70 IU/L (ref 40–128)
ALT SERPL-CCNC: 32 U/L (ref 10–40)
ANION GAP SERPL CALCULATED.3IONS-SCNC: 11 MMOL/L (ref 4–16)
AST SERPL-CCNC: 23 IU/L (ref 15–37)
BASOPHILS ABSOLUTE: 0.1 K/CU MM
BASOPHILS RELATIVE PERCENT: 0.6 % (ref 0–1)
BILIRUB SERPL-MCNC: 0.6 MG/DL (ref 0–1)
BUN BLDV-MCNC: 14 MG/DL (ref 6–23)
CALCIUM SERPL-MCNC: 8.3 MG/DL (ref 8.3–10.6)
CHLORIDE BLD-SCNC: 102 MMOL/L (ref 99–110)
CO2: 26 MMOL/L (ref 21–32)
CREAT SERPL-MCNC: 0.8 MG/DL (ref 0.9–1.3)
CULTURE: NORMAL
DIFFERENTIAL TYPE: ABNORMAL
EOSINOPHILS ABSOLUTE: 0.8 K/CU MM
EOSINOPHILS RELATIVE PERCENT: 9.7 % (ref 0–3)
GFR AFRICAN AMERICAN: >60 ML/MIN/1.73M2
GFR NON-AFRICAN AMERICAN: >60 ML/MIN/1.73M2
GLUCOSE BLD-MCNC: 146 MG/DL (ref 70–99)
HCT VFR BLD CALC: 46.4 % (ref 42–52)
HEMOGLOBIN: 15.7 GM/DL (ref 13.5–18)
HIGH SENSITIVE C-REACTIVE PROTEIN: 68.4 MG/L
IMMATURE NEUTROPHIL %: 0.4 % (ref 0–0.43)
LEGIONELLA URINARY AG: NEGATIVE
LYMPHOCYTES ABSOLUTE: 1.8 K/CU MM
LYMPHOCYTES RELATIVE PERCENT: 23.5 % (ref 24–44)
Lab: NORMAL
MAGNESIUM: 2.3 MG/DL (ref 1.8–2.4)
MCH RBC QN AUTO: 31.2 PG (ref 27–31)
MCHC RBC AUTO-ENTMCNC: 33.8 % (ref 32–36)
MCV RBC AUTO: 92.1 FL (ref 78–100)
MONOCYTES ABSOLUTE: 1 K/CU MM
MONOCYTES RELATIVE PERCENT: 12.2 % (ref 0–4)
NUCLEATED RBC %: 0 %
PDW BLD-RTO: 11.9 % (ref 11.7–14.9)
PHOSPHORUS: 2.7 MG/DL (ref 2.5–4.9)
PLATELET # BLD: 201 K/CU MM (ref 140–440)
PMV BLD AUTO: 9.9 FL (ref 7.5–11.1)
POTASSIUM SERPL-SCNC: 3.4 MMOL/L (ref 3.5–5.1)
PRO-BNP: 31.09 PG/ML
PROCALCITONIN: 0.2
RBC # BLD: 5.04 M/CU MM (ref 4.6–6.2)
SEGMENTED NEUTROPHILS ABSOLUTE COUNT: 4.2 K/CU MM
SEGMENTED NEUTROPHILS RELATIVE PERCENT: 53.6 % (ref 36–66)
SODIUM BLD-SCNC: 139 MMOL/L (ref 135–145)
SPECIMEN: NORMAL
STREP A DIRECT SCREEN: NEGATIVE
STREP PNEUMONIAE ANTIGEN: NORMAL
TOTAL IMMATURE NEUTOROPHIL: 0.03 K/CU MM
TOTAL NUCLEATED RBC: 0 K/CU MM
TOTAL PROTEIN: 6 GM/DL (ref 6.4–8.2)
WBC # BLD: 7.8 K/CU MM (ref 4–10.5)

## 2021-06-04 PROCEDURE — 36415 COLL VENOUS BLD VENIPUNCTURE: CPT

## 2021-06-04 PROCEDURE — 85025 COMPLETE CBC W/AUTO DIFF WBC: CPT

## 2021-06-04 PROCEDURE — 94640 AIRWAY INHALATION TREATMENT: CPT

## 2021-06-04 PROCEDURE — 80053 COMPREHEN METABOLIC PANEL: CPT

## 2021-06-04 PROCEDURE — 1200000000 HC SEMI PRIVATE

## 2021-06-04 PROCEDURE — 6370000000 HC RX 637 (ALT 250 FOR IP): Performed by: INTERNAL MEDICINE

## 2021-06-04 PROCEDURE — 94664 DEMO&/EVAL PT USE INHALER: CPT

## 2021-06-04 PROCEDURE — 83735 ASSAY OF MAGNESIUM: CPT

## 2021-06-04 PROCEDURE — 84145 PROCALCITONIN (PCT): CPT

## 2021-06-04 PROCEDURE — 2580000003 HC RX 258: Performed by: INTERNAL MEDICINE

## 2021-06-04 PROCEDURE — 86141 C-REACTIVE PROTEIN HS: CPT

## 2021-06-04 PROCEDURE — 94761 N-INVAS EAR/PLS OXIMETRY MLT: CPT

## 2021-06-04 PROCEDURE — 6360000002 HC RX W HCPCS: Performed by: INTERNAL MEDICINE

## 2021-06-04 PROCEDURE — 2700000000 HC OXYGEN THERAPY PER DAY

## 2021-06-04 PROCEDURE — 83880 ASSAY OF NATRIURETIC PEPTIDE: CPT

## 2021-06-04 PROCEDURE — 84100 ASSAY OF PHOSPHORUS: CPT

## 2021-06-04 RX ADMIN — AMLODIPINE BESYLATE 10 MG: 10 TABLET ORAL at 10:53

## 2021-06-04 RX ADMIN — ENOXAPARIN SODIUM 40 MG: 40 INJECTION SUBCUTANEOUS at 10:54

## 2021-06-04 RX ADMIN — IPRATROPIUM BROMIDE AND ALBUTEROL SULFATE 1 AMPULE: 2.5; .5 SOLUTION RESPIRATORY (INHALATION) at 07:13

## 2021-06-04 RX ADMIN — LEVETIRACETAM 500 MG: 500 TABLET, FILM COATED ORAL at 10:53

## 2021-06-04 RX ADMIN — GUAIFENESIN 600 MG: 600 TABLET, EXTENDED RELEASE ORAL at 10:57

## 2021-06-04 RX ADMIN — LEVETIRACETAM 500 MG: 500 TABLET, FILM COATED ORAL at 21:59

## 2021-06-04 RX ADMIN — GUAIFENESIN 600 MG: 600 TABLET, EXTENDED RELEASE ORAL at 21:59

## 2021-06-04 RX ADMIN — IPRATROPIUM BROMIDE AND ALBUTEROL SULFATE 1 AMPULE: 2.5; .5 SOLUTION RESPIRATORY (INHALATION) at 11:23

## 2021-06-04 RX ADMIN — PANTOPRAZOLE SODIUM 40 MG: 40 TABLET, DELAYED RELEASE ORAL at 17:02

## 2021-06-04 RX ADMIN — IPRATROPIUM BROMIDE AND ALBUTEROL SULFATE 1 AMPULE: 2.5; .5 SOLUTION RESPIRATORY (INHALATION) at 21:00

## 2021-06-04 RX ADMIN — AZITHROMYCIN DIHYDRATE 500 MG: 500 INJECTION, POWDER, LYOPHILIZED, FOR SOLUTION INTRAVENOUS at 05:13

## 2021-06-04 RX ADMIN — PANTOPRAZOLE SODIUM 40 MG: 40 TABLET, DELAYED RELEASE ORAL at 05:44

## 2021-06-04 RX ADMIN — IPRATROPIUM BROMIDE AND ALBUTEROL SULFATE 1 AMPULE: 2.5; .5 SOLUTION RESPIRATORY (INHALATION) at 15:24

## 2021-06-04 RX ADMIN — CEFTRIAXONE 1000 MG: 1 INJECTION, POWDER, FOR SOLUTION INTRAMUSCULAR; INTRAVENOUS at 03:55

## 2021-06-04 RX ADMIN — LEVOTHYROXINE SODIUM 25 MCG: 25 TABLET ORAL at 05:44

## 2021-06-04 RX ADMIN — PAROXETINE HYDROCHLORIDE 40 MG: 20 TABLET, FILM COATED ORAL at 10:54

## 2021-06-04 ASSESSMENT — PAIN SCALES - GENERAL
PAINLEVEL_OUTOF10: 0

## 2021-06-04 NOTE — PROGRESS NOTES
Assessment completed (see doc flow sheet), in bed resting, bed alarm activated, call light within reach, without distress, vital signs stable (see doc flow sheet), denies pain or needs at present time, will continue to monitor and treat. Sophia Shaffer RN

## 2021-06-04 NOTE — CONSULTS
[address], [facility]                                  CONSULTATION    PATIENT NAME: Rosa M Pavon               :        1965  MED REC NO:   [id number]                          ROOM:       2019  ACCOUNT NO:   [de-identified]                           ADMIT DATE: 2021  PROVIDER:     Calli Gandara MD    CONSULT DATE:  2021    HISTORY OF PRESENT ILLNESS:  The patient is a 25-year-old gentleman with  multiple medical problems including hypertension, depression, seizure  disorder, cerebral palsy, who lives in a group home, who was admitted  through the emergency room with complaints of increasing shortness of  breath, cough productive of whitish to yellow phlegm. He denied any  hemoptysis. He denied any fever or chills. He denied any nausea or  vomiting. He denied any chest pains. He had a CAT scan of the chest  which showed bilateral pneumonia. He was hypoxic. He was placed on  oxygen, started on antibiotics and was admitted to the hospital.  His  respiratory disease panel PCR was positive for parainfluenza 3. His  COVID-19 was negative. PAST MEDICAL HISTORY:  Significant for hypertension, depression, seizure  disorder, cerebral palsy. PAST SURGICAL HISTORY:  Remarkable for eye surgery, abdominal surgery,  upper endoscopy. FAMILY HISTORY:  Noncontributory. SOCIAL HISTORY:  Reveals that he is a nonsmoker. No history of alcohol  or drug abuse. MEDICATIONS:  His medications were reviewed. ALLERGIES:  He has no known allergies. REVIEW OF SYSTEMS:  A 10- to 14-point review of systems were reviewed  and are negative except for what is mentioned in history of present  illness. PHYSICAL EXAMINATION:  GENERAL:  The patient is awake and responsive, in no acute respiratory  distress.   VITAL SIGNS:  Blood pressure was 131/91 mmHg, pulse of 94 per minute,  respiratory rate of 17 per

## 2021-06-04 NOTE — PROGRESS NOTES
97 146*      ABG:  No results for input(s): PH, PO2ART, [id number], HCO3, BEART, O2SAT in the last 72 hours. Lab Results   Component Value Date    PROBNP 31.09 06/04/2021    PROBNP 50.93 06/01/2021    PROBNP 20.25 08/25/2020     No results found for: [facility]    Radiology Review:  Pertinent images / reports were reviewed as a part of this visit. Assessment:     Patient Active Problem List   Diagnosis    Pneumonia    Cerebral palsy, hemiplegic (HCC)    Hypoxia    Seizure disorder ([facility] [address]    Gait disturbance    Pneumonia of both lungs due to infectious organism    Acute upper GI bleed    Cecal polyp    Sebaceous cyst       Plan:   1. Overall the patient has improved. 2. [address] Activity.     Tito Arenas MD MD  6/4/2021  10:22 AM

## 2021-06-04 NOTE — PROGRESS NOTES
Hospitalist Progress Note      Name:  Cristal Gibson /Age/Sex: 1965  (63 y.o. male)   MRN & CSN:  [id number] & [id number] Admission Date/Time: 2021 10:22 PM   Location:  -A PCP: Starla Berumen MD         Hospital Day: 4    Assessment and Plan:   Cristal Gibson is a 63 y.o. male p/w acute hypoxemic respiratory failure     Acute hypoxic respiratory failure  Community Acquired Pneumonia  Parainfluenza Infection  - Continue Azithromycin/Ceftriaxone  - Continue breathing treatments   - Trend inflammatory markers  - f/u sputum cultures and urine leginella/strep  - Symptomatic treatment PRN and scheduled Guiafenasin  - Appreciate pulmonology assistance    Type 2 diabetes- uncontrolled  - ISS, TIDAC finger stick  - Hypoglycemic protocol     Possible emphysematous disease  - Continue inhaler PRN  - Pulmonology following, appreciate recs     New diagnosis hepatic steatosis  - LFTS wnl, f/u outpatient     Hypertension-labile, uncontrolled  -Patient is on Norvasc     Depression-patient is on Paxil.     Seizure disorder  Keppra 500 mg twice daily p.o. twice daily     History of cerebral palsy    Diet ADULT DIET; Regular   DVT Prophylaxis [] Lovenox, []  Heparin, [] SCDs, [] Ambulation   GI Prophylaxis [] PPI,  [] H2 Blocker,  [] Carafate,  [] Diet/Tube Feeds   Code Status Full Code   Disposition Patient requires continued admission due to    MDM [] Low, [] Moderate,[]  High  Patient's risk as above due t     History of Present Illness:     Cough improved per patient but still on 6L; no fevers overnight; no chills    Objective: Intake/Output Summary (Last 24 hours) at 2021 1019  Last data filed at 2021 0649  Gross per 24 hour   Intake --   Output 151 ml   Net -151 ml      Vitals:   Vitals:    21 0823   BP: (!) 142/111   Pulse: 90   Resp: 19   Temp: 97.8 °F (36.6 °C)   SpO2: 93%     Physical Exam:   GEN Awake male, lying in bed; no distress  EYES Pupils are equally round.   No scleral erythema, discharge, or conjunctivitis. NECK Supple, no apparent thyromegaly or masses. RESP Some scattered rhochi; 6L NC this AM  CARDIO/VASC S1/S2 auscultated. Regular rate without appreciable murmurs, rubs, or gallops. GI Abdomen is soft without significant tenderness, masses, or guarding   No costovertebral angle tenderness  MSK No gross joint deformities. SKIN Normal coloration, warm, dry. NEURO Cranial nerves appear grossly intact, normal speech, no lateralizing weakness.   PSYCH Awake, alert, oriented    Medications:   Medications:    ipratropium-albuterol  1 ampule Inhalation Q4H WA    guaiFENesin  600 mg Oral BID    amLODIPine  10 mg Oral QAM    levETIRAcetam  500 mg Oral BID    levothyroxine  25 mcg Oral Daily    pantoprazole  40 mg Oral BID AC    PARoxetine  40 mg Oral QAM    sodium chloride flush  5-40 mL Intravenous 2 times per day    enoxaparin  40 mg Subcutaneous Daily    cefTRIAXone (ROCEPHIN) IV  1,000 mg Intravenous Q24H    And    azithromycin  500 mg Intravenous Q24H      Infusions:    sodium chloride       PRN Meds: sodium chloride flush, 5-40 mL, PRN  sodium chloride flush, 5-40 mL, PRN  sodium chloride, 25 mL, PRN  ondansetron, 4 mg, Q8H PRN   Or  ondansetron, 4 mg, Q6H PRN  polyethylene glycol, 17 g, Daily PRN  acetaminophen, 650 mg, Q6H PRN   Or  acetaminophen, 650 mg, Q6H PRN  albuterol sulfate HFA, 2 puff, Q6H PRN          Electronically signed by Jose A Peters MD on 6/4/2021 at 10:19 AM

## 2021-06-05 ENCOUNTER — APPOINTMENT (OUTPATIENT)
Dept: CT IMAGING | Age: 56
DRG: 871 | End: 2021-06-05
Payer: MEDICARE

## 2021-06-05 LAB
ALBUMIN SERPL-MCNC: 3.6 GM/DL (ref 3.4–5)
ALP BLD-CCNC: 69 IU/L (ref 40–128)
ALT SERPL-CCNC: 38 U/L (ref 10–40)
ANION GAP SERPL CALCULATED.3IONS-SCNC: 9 MMOL/L (ref 4–16)
AST SERPL-CCNC: 26 IU/L (ref 15–37)
BASOPHILS ABSOLUTE: 0.1 K/CU MM
BASOPHILS RELATIVE PERCENT: 0.6 % (ref 0–1)
BILIRUB SERPL-MCNC: 0.4 MG/DL (ref 0–1)
BUN BLDV-MCNC: 12 MG/DL (ref 6–23)
CALCIUM SERPL-MCNC: 8.4 MG/DL (ref 8.3–10.6)
CHLORIDE BLD-SCNC: 105 MMOL/L (ref 99–110)
CO2: 30 MMOL/L (ref 21–32)
CREAT SERPL-MCNC: 0.8 MG/DL (ref 0.9–1.3)
DIFFERENTIAL TYPE: ABNORMAL
EOSINOPHILS ABSOLUTE: 1.1 K/CU MM
EOSINOPHILS RELATIVE PERCENT: 11 % (ref 0–3)
GFR AFRICAN AMERICAN: >60 ML/MIN/1.73M2
GFR NON-AFRICAN AMERICAN: >60 ML/MIN/1.73M2
GLUCOSE BLD-MCNC: 115 MG/DL (ref 70–99)
GLUCOSE BLD-MCNC: 133 MG/DL (ref 70–99)
HCT VFR BLD CALC: 44.5 % (ref 42–52)
HEMOGLOBIN: 15 GM/DL (ref 13.5–18)
HIGH SENSITIVE C-REACTIVE PROTEIN: 32 MG/L
IMMATURE NEUTROPHIL %: 0.3 % (ref 0–0.43)
LYMPHOCYTES ABSOLUTE: 2.4 K/CU MM
LYMPHOCYTES RELATIVE PERCENT: 24.2 % (ref 24–44)
MAGNESIUM: 2.1 MG/DL (ref 1.8–2.4)
MCH RBC QN AUTO: 31.2 PG (ref 27–31)
MCHC RBC AUTO-ENTMCNC: 33.7 % (ref 32–36)
MCV RBC AUTO: 92.5 FL (ref 78–100)
MONOCYTES ABSOLUTE: 1 K/CU MM
MONOCYTES RELATIVE PERCENT: 10.4 % (ref 0–4)
NUCLEATED RBC %: 0 %
PDW BLD-RTO: 11.9 % (ref 11.7–14.9)
PLATELET # BLD: 246 K/CU MM (ref 140–440)
PMV BLD AUTO: 9.4 FL (ref 7.5–11.1)
POTASSIUM SERPL-SCNC: 3.3 MMOL/L (ref 3.5–5.1)
PROCALCITONIN: 0.17
RBC # BLD: 4.81 M/CU MM (ref 4.6–6.2)
SEGMENTED NEUTROPHILS ABSOLUTE COUNT: 5.3 K/CU MM
SEGMENTED NEUTROPHILS RELATIVE PERCENT: 53.5 % (ref 36–66)
SODIUM BLD-SCNC: 144 MMOL/L (ref 135–145)
TOTAL IMMATURE NEUTOROPHIL: 0.03 K/CU MM
TOTAL NUCLEATED RBC: 0 K/CU MM
TOTAL PROTEIN: 6 GM/DL (ref 6.4–8.2)
WBC # BLD: 9.9 K/CU MM (ref 4–10.5)

## 2021-06-05 PROCEDURE — 2580000003 HC RX 258: Performed by: INTERNAL MEDICINE

## 2021-06-05 PROCEDURE — 6370000000 HC RX 637 (ALT 250 FOR IP): Performed by: INTERNAL MEDICINE

## 2021-06-05 PROCEDURE — 82962 GLUCOSE BLOOD TEST: CPT

## 2021-06-05 PROCEDURE — 2700000000 HC OXYGEN THERAPY PER DAY

## 2021-06-05 PROCEDURE — 6360000002 HC RX W HCPCS: Performed by: INTERNAL MEDICINE

## 2021-06-05 PROCEDURE — 94640 AIRWAY INHALATION TREATMENT: CPT

## 2021-06-05 PROCEDURE — 86141 C-REACTIVE PROTEIN HS: CPT

## 2021-06-05 PROCEDURE — 36415 COLL VENOUS BLD VENIPUNCTURE: CPT

## 2021-06-05 PROCEDURE — 85025 COMPLETE CBC W/AUTO DIFF WBC: CPT

## 2021-06-05 PROCEDURE — 84145 PROCALCITONIN (PCT): CPT

## 2021-06-05 PROCEDURE — 94761 N-INVAS EAR/PLS OXIMETRY MLT: CPT

## 2021-06-05 PROCEDURE — 70450 CT HEAD/BRAIN W/O DYE: CPT

## 2021-06-05 PROCEDURE — 83735 ASSAY OF MAGNESIUM: CPT

## 2021-06-05 PROCEDURE — 1200000000 HC SEMI PRIVATE

## 2021-06-05 PROCEDURE — 80053 COMPREHEN METABOLIC PANEL: CPT

## 2021-06-05 RX ORDER — ALBUTEROL SULFATE 90 UG/1
2 AEROSOL, METERED RESPIRATORY (INHALATION)
Status: DISCONTINUED | OUTPATIENT
Start: 2021-06-05 | End: 2021-06-09 | Stop reason: HOSPADM

## 2021-06-05 RX ORDER — IPRATROPIUM BROMIDE AND ALBUTEROL SULFATE 2.5; .5 MG/3ML; MG/3ML
1 SOLUTION RESPIRATORY (INHALATION)
Status: DISCONTINUED | OUTPATIENT
Start: 2021-06-05 | End: 2021-06-08

## 2021-06-05 RX ORDER — POTASSIUM CHLORIDE 20 MEQ/1
40 TABLET, EXTENDED RELEASE ORAL ONCE
Status: COMPLETED | OUTPATIENT
Start: 2021-06-05 | End: 2021-06-05

## 2021-06-05 RX ADMIN — PAROXETINE HYDROCHLORIDE 40 MG: 20 TABLET, FILM COATED ORAL at 10:05

## 2021-06-05 RX ADMIN — LEVETIRACETAM 500 MG: 500 TABLET, FILM COATED ORAL at 10:05

## 2021-06-05 RX ADMIN — SODIUM CHLORIDE, PRESERVATIVE FREE 10 ML: 5 INJECTION INTRAVENOUS at 10:05

## 2021-06-05 RX ADMIN — ENOXAPARIN SODIUM 40 MG: 40 INJECTION SUBCUTANEOUS at 10:05

## 2021-06-05 RX ADMIN — GUAIFENESIN 600 MG: 600 TABLET, EXTENDED RELEASE ORAL at 10:05

## 2021-06-05 RX ADMIN — IPRATROPIUM BROMIDE AND ALBUTEROL SULFATE 1 AMPULE: 2.5; .5 SOLUTION RESPIRATORY (INHALATION) at 15:25

## 2021-06-05 RX ADMIN — SODIUM CHLORIDE, PRESERVATIVE FREE 10 ML: 5 INJECTION INTRAVENOUS at 21:21

## 2021-06-05 RX ADMIN — POTASSIUM CHLORIDE 40 MEQ: 1500 TABLET, EXTENDED RELEASE ORAL at 10:05

## 2021-06-05 RX ADMIN — GUAIFENESIN 600 MG: 600 TABLET, EXTENDED RELEASE ORAL at 21:21

## 2021-06-05 RX ADMIN — AMLODIPINE BESYLATE 10 MG: 10 TABLET ORAL at 10:04

## 2021-06-05 RX ADMIN — LEVETIRACETAM 500 MG: 500 TABLET, FILM COATED ORAL at 21:21

## 2021-06-05 RX ADMIN — IPRATROPIUM BROMIDE AND ALBUTEROL SULFATE 1 AMPULE: 2.5; .5 SOLUTION RESPIRATORY (INHALATION) at 11:03

## 2021-06-05 RX ADMIN — PANTOPRAZOLE SODIUM 40 MG: 40 TABLET, DELAYED RELEASE ORAL at 20:14

## 2021-06-05 RX ADMIN — AZITHROMYCIN DIHYDRATE 500 MG: 500 INJECTION, POWDER, LYOPHILIZED, FOR SOLUTION INTRAVENOUS at 04:40

## 2021-06-05 RX ADMIN — IPRATROPIUM BROMIDE AND ALBUTEROL SULFATE 1 AMPULE: 2.5; .5 SOLUTION RESPIRATORY (INHALATION) at 20:41

## 2021-06-05 RX ADMIN — CEFTRIAXONE 1000 MG: 1 INJECTION, POWDER, FOR SOLUTION INTRAMUSCULAR; INTRAVENOUS at 04:05

## 2021-06-05 ASSESSMENT — PAIN SCALES - GENERAL: PAINLEVEL_OUTOF10: 0

## 2021-06-05 NOTE — RT PROTOCOL NOTE
RT Nebulizer Bronchodilator Protocol Note    There is a bronchodilator order in the chart from a provider indicating to follow the RT Bronchodilator Protocol and there is an Initiate RT Bronchodilator Protocol order as well (see protocol at bottom of note). The findings from the last RT Protocol Assessment were as follows:  Smoking: Non smoker  Surgical Status: No surgery  Xray: One lobe infiltrates/atelectasis/pleural effusion/edema  Respiratory Pattern: RR 12-20  Mental Status: Alert and Oriented  Breath Sounds: Diminished and/or crackles  Cough: Strong, spontaneous, non-productive  Activity Level: Walking unassisted  Oxygen Requirement: 29% or 3LNC - 5LNC/40%  Indication for Bronchodilator Therapy: None  Bronchodilator Assessment Score: 3    Aerosolized bronchodilator medication orders have been revised according to the RT Bronchodilator Protocol. RT Bronchodilator Protocol:    Respiratory Therapist to perform RT Therapy Protocol Assessment then follow the protocol. No Indications - adjust the frequency to every 6 hours PRN wheezing or bronchospasm, if no treatments needed after 48 hours then discontinue using Per Protocol order mode. If indication present, adjust the RT bronchodilator orders based on the Bronchodilator Assessment Score as follows:    0-6 - enter or revise RT Bronchodilator order to Albuterol Nebulizer order with frequency of every 2 hours PRN for wheezing or increased work of breathing using Per Protocol order mode. Repeat RT Therapy Protocol Assessment as needed. 7-10 - discontinue any other Inpatient aerosolized bronchodilator medication orders and enter or revise two Albuterol Nebulizer orders, one with BID frequency and one with frequency of every 2 hours PRN wheezing or increased work of breathing using Per Protocol order mode. Repeat RT Therapy Protocol Assessment with second treatment then BID and as needed.       11-13 - discontinue any other Inpatient aerosolized bronchodilator medication orders and enter DuoNeb Nebulizer order with QID frequency and an Albuterol Nebulizer order with frequency of every 2 hours PRN wheezing or increased work of breathing using Per Protocol order mode. Repeat RT Therapy Protocol Assessment with second treatment then QID and as needed. Greater than 13 - discontinue any other Inpatient aerosolized bronchodilator medication orders and enter a DuoNeb Nebulizer order with every 4 hours frequency and an Albuterol Nebulizer order with frequency of every 2 hours PRN wheezing or increased work of breathing using Per Protocol order mode. Repeat RT Therapy Protocol Assessment with second treatment then every 4 hours and as needed. RT to enter RT Home Evaluation for COPD & MDI Assessment order using Per Protocol order mode.     Electronically signed by Cheri Black RCP on 6/5/2021 at 8:02 AM

## 2021-06-05 NOTE — PROGRESS NOTES
Patient slipped and toppled over while using the rest room. I witnessed the fall and he hit his right elbow and right side of his face. Called the house supervisor and called and advised Dr Yazmin Knox.

## 2021-06-05 NOTE — PROGRESS NOTES
Pulmonary and Critical Care  Progress Note    Subjective: The patient is better. Shortness of breath has improved. Chest pain none  Addressing respiratory complaints Patient is negative for  hemoptysis and cyanosis  CONSTITUTIONAL:  negative for fevers and chills      Past Medical History:     has a past medical history of Cerebral palsy ([facility] [address] Depression, Hypertension, and Seizures ([facility] [address] has a past surgical history that includes Eye surgery; Abdomen surgery; and Upper gastrointestinal endoscopy (N/A, 12/3/2019). reports that he has never smoked. He has never used smokeless tobacco. He reports that he does not drink alcohol and does not use drugs. Family history:  family history is not on file. No Known Allergies  Social History:    Reviewed; no changes    Objective:   PHYSICAL EXAM:        VITALS:  /83   Pulse 83   Temp 97.5 °F (36.4 °C) (Oral)   Resp 22   Ht 5' 7\" (1.702 m)   Wt 171 lb 4.8 oz (77.7 kg)   SpO2 93%   BMI 26.83 kg/m²     24HR INTAKE/OUTPUT:      Intake/Output Summary (Last 24 hours) at 6/5/2021 1116  Last data filed at 6/5/2021 0818  Gross per 24 hour   Intake --   Output 475 ml   Net -475 ml       CONSTITUTIONAL:  awake, alert, cooperative, no apparent distress, and appears stated age. LUNGS:  decreased breath sounds, occ basilar crackles. CARDIOVASCULAR:  normal S1 and S2 and negative JVD  ABD:Abdomen soft, non-tender. BS normal. No masses,  No organomegaly  NEURO:Alert.   DATA:    CBC:  Recent Labs     06/03/21  0754 06/04/21  0559 06/05/21  0454   WBC 9.0 7.8 9.9   RBC 5.01 5.04 4.81   HGB 15.4 15.7 15.0   HCT 44.2 46.4 44.5    201 246   MCV 88.2 92.1 92.5   MCH 30.7 31.2* 31.2*   MCHC 34.8 33.8 33.7   RDW 11.7 11.9 11.9   SEGSPCT 57.0 53.6 53.5      BMP:  Recent Labs     06/03/21  0754 06/04/21  0559 06/05/21  0454    139 144   K 3.6 3.4* 3.3*   CL 98* 102 105   CO2 25 26 30   BUN 11 14 12   CREATININE 0.8* 0.8* 0.8*   CALCIUM 8.4 8.3 8.4   GLUCOSE 97 146* 133*      ABG:  No results for input(s): PH, PO2ART, [id number], HCO3, BEART, O2SAT in the last 72 hours. Lab Results   Component Value Date    PROBNP 31.09 06/04/2021    PROBNP 50.93 06/01/2021    PROBNP 20.25 08/25/2020     No results found for: [facility]    Radiology Review:  Pertinent images / reports were reviewed as a part of this visit. Assessment:     Patient Active Problem List   Diagnosis    Pneumonia    Cerebral palsy, hemiplegic (HCC)    Hypoxia    Seizure disorder ([facility] [address]    Gait disturbance    Pneumonia of both lungs due to infectious organism    Acute upper GI bleed    Cecal polyp    Sebaceous cyst       Plan:   1. Overall the patient is better. 2. [address] Activity.     Darshan Hyatt MD MD  6/5/2021  11:16 AM

## 2021-06-05 NOTE — PLAN OF CARE
Problem: Discharge Planning:  Goal: Discharged to appropriate level of care  Outcome: Ongoing  Goal: Participates in care planning  Outcome: Ongoing     Problem: Airway Clearance - Ineffective:  Goal: Clear lung sounds  Outcome: Ongoing  Goal: Ability to maintain a clear airway will improve  Outcome: Ongoing     Problem: Fluid Volume - Deficit:  Goal: Achieves intake and output within specified parameters  Outcome: Ongoing     Problem: Gas Exchange - Impaired:  Goal: Levels of oxygenation will improve  Outcome: Ongoing     Problem: Hyperthermia:  Goal: Ability to maintain a body temperature in the normal range will improve  Outcome: Ongoing     Problem: Tobacco Use:  Goal: Will participate in inpatient tobacco-use cessation counseling  Outcome: Ongoing     Problem: Falls - Risk of:  Goal: Will remain free from falls  Outcome: Ongoing  Goal: Absence of physical injury  Outcome: Ongoing

## 2021-06-05 NOTE — PROGRESS NOTES
Physical Exam:   GEN Awake male, lying in bed; no distress  EYES Pupils are equally round. No scleral erythema, discharge, or conjunctivitis. NECK Supple, no apparent thyromegaly or masses. RESP Still with rhochi but down to 4L today  CARDIO/VASC S1/S2 auscultated. Regular rate without appreciable murmurs, rubs, or gallops. GI Abdomen is soft without significant tenderness, masses, or guarding   No costovertebral angle tenderness  MSK No gross joint deformities. SKIN Normal coloration, warm, dry. NEURO Cranial nerves appear grossly intact, normal speech, no lateralizing weakness.   PSYCH Awake, alert, oriented    Medications:   Medications:    potassium chloride  40 mEq Oral Once    guaiFENesin  600 mg Oral BID    amLODIPine  10 mg Oral QAM    levETIRAcetam  500 mg Oral BID    levothyroxine  25 mcg Oral Daily    pantoprazole  40 mg Oral BID AC    PARoxetine  40 mg Oral QAM    sodium chloride flush  5-40 mL Intravenous 2 times per day    enoxaparin  40 mg Subcutaneous Daily    cefTRIAXone (ROCEPHIN) IV  1,000 mg Intravenous Q24H    And    azithromycin  500 mg Intravenous Q24H      Infusions:    sodium chloride       PRN Meds: albuterol sulfate HFA, 2 puff, Q2H PRN  sodium chloride flush, 5-40 mL, PRN  sodium chloride flush, 5-40 mL, PRN  sodium chloride, 25 mL, PRN  ondansetron, 4 mg, Q8H PRN   Or  ondansetron, 4 mg, Q6H PRN  polyethylene glycol, 17 g, Daily PRN  acetaminophen, 650 mg, Q6H PRN   Or  acetaminophen, 650 mg, Q6H PRN          Electronically signed by Clara Marmolejo MD on 6/5/2021 at 9:32 AM

## 2021-06-06 ENCOUNTER — APPOINTMENT (OUTPATIENT)
Dept: GENERAL RADIOLOGY | Age: 56
DRG: 871 | End: 2021-06-06
Payer: MEDICARE

## 2021-06-06 LAB
ALBUMIN SERPL-MCNC: 3.9 GM/DL (ref 3.4–5)
ALP BLD-CCNC: 69 IU/L (ref 40–129)
ALT SERPL-CCNC: 40 U/L (ref 10–40)
ANION GAP SERPL CALCULATED.3IONS-SCNC: 10 MMOL/L (ref 4–16)
AST SERPL-CCNC: 23 IU/L (ref 15–37)
BASOPHILS ABSOLUTE: 0.1 K/CU MM
BASOPHILS RELATIVE PERCENT: 0.8 % (ref 0–1)
BILIRUB SERPL-MCNC: 0.5 MG/DL (ref 0–1)
BUN BLDV-MCNC: 11 MG/DL (ref 6–23)
CALCIUM SERPL-MCNC: 8.3 MG/DL (ref 8.3–10.6)
CHLORIDE BLD-SCNC: 102 MMOL/L (ref 99–110)
CO2: 27 MMOL/L (ref 21–32)
CREAT SERPL-MCNC: 0.7 MG/DL (ref 0.9–1.3)
CULTURE: NORMAL
DIFFERENTIAL TYPE: ABNORMAL
EOSINOPHILS ABSOLUTE: 1.1 K/CU MM
EOSINOPHILS RELATIVE PERCENT: 10.9 % (ref 0–3)
GFR AFRICAN AMERICAN: >60 ML/MIN/1.73M2
GFR NON-AFRICAN AMERICAN: >60 ML/MIN/1.73M2
GLUCOSE BLD-MCNC: 143 MG/DL (ref 70–99)
GRAM SMEAR: NORMAL
HCT VFR BLD CALC: 42.6 % (ref 42–52)
HEMOGLOBIN: 14.4 GM/DL (ref 13.5–18)
HIGH SENSITIVE C-REACTIVE PROTEIN: 32.8 MG/L
IMMATURE NEUTROPHIL %: 0.3 % (ref 0–0.43)
LYMPHOCYTES ABSOLUTE: 2.3 K/CU MM
LYMPHOCYTES RELATIVE PERCENT: 22.9 % (ref 24–44)
Lab: NORMAL
MCH RBC QN AUTO: 31.4 PG (ref 27–31)
MCHC RBC AUTO-ENTMCNC: 33.8 % (ref 32–36)
MCV RBC AUTO: 92.8 FL (ref 78–100)
MONOCYTES ABSOLUTE: 1.1 K/CU MM
MONOCYTES RELATIVE PERCENT: 11.4 % (ref 0–4)
NUCLEATED RBC %: 0 %
PDW BLD-RTO: 11.9 % (ref 11.7–14.9)
PLATELET # BLD: 296 K/CU MM (ref 140–440)
PMV BLD AUTO: 9.6 FL (ref 7.5–11.1)
POTASSIUM SERPL-SCNC: 3.6 MMOL/L (ref 3.5–5.1)
PROCALCITONIN: 0.15
RBC # BLD: 4.59 M/CU MM (ref 4.6–6.2)
SEGMENTED NEUTROPHILS ABSOLUTE COUNT: 5.4 K/CU MM
SEGMENTED NEUTROPHILS RELATIVE PERCENT: 53.7 % (ref 36–66)
SODIUM BLD-SCNC: 139 MMOL/L (ref 135–145)
SPECIMEN: NORMAL
TOTAL IMMATURE NEUTOROPHIL: 0.03 K/CU MM
TOTAL NUCLEATED RBC: 0 K/CU MM
TOTAL PROTEIN: 6.2 GM/DL (ref 6.4–8.2)
WBC # BLD: 10 K/CU MM (ref 4–10.5)

## 2021-06-06 PROCEDURE — 36415 COLL VENOUS BLD VENIPUNCTURE: CPT

## 2021-06-06 PROCEDURE — 94761 N-INVAS EAR/PLS OXIMETRY MLT: CPT

## 2021-06-06 PROCEDURE — 6370000000 HC RX 637 (ALT 250 FOR IP): Performed by: INTERNAL MEDICINE

## 2021-06-06 PROCEDURE — 80053 COMPREHEN METABOLIC PANEL: CPT

## 2021-06-06 PROCEDURE — 6360000002 HC RX W HCPCS: Performed by: INTERNAL MEDICINE

## 2021-06-06 PROCEDURE — 84145 PROCALCITONIN (PCT): CPT

## 2021-06-06 PROCEDURE — 1200000000 HC SEMI PRIVATE

## 2021-06-06 PROCEDURE — 94640 AIRWAY INHALATION TREATMENT: CPT

## 2021-06-06 PROCEDURE — 2700000000 HC OXYGEN THERAPY PER DAY

## 2021-06-06 PROCEDURE — 2580000003 HC RX 258: Performed by: INTERNAL MEDICINE

## 2021-06-06 PROCEDURE — 85025 COMPLETE CBC W/AUTO DIFF WBC: CPT

## 2021-06-06 PROCEDURE — 86141 C-REACTIVE PROTEIN HS: CPT

## 2021-06-06 PROCEDURE — 71046 X-RAY EXAM CHEST 2 VIEWS: CPT

## 2021-06-06 RX ADMIN — SODIUM CHLORIDE, PRESERVATIVE FREE 10 ML: 5 INJECTION INTRAVENOUS at 21:48

## 2021-06-06 RX ADMIN — SODIUM CHLORIDE, PRESERVATIVE FREE 10 ML: 5 INJECTION INTRAVENOUS at 11:37

## 2021-06-06 RX ADMIN — LEVETIRACETAM 500 MG: 500 TABLET, FILM COATED ORAL at 11:36

## 2021-06-06 RX ADMIN — GUAIFENESIN 600 MG: 600 TABLET, EXTENDED RELEASE ORAL at 21:47

## 2021-06-06 RX ADMIN — LEVETIRACETAM 500 MG: 500 TABLET, FILM COATED ORAL at 21:47

## 2021-06-06 RX ADMIN — CEFTRIAXONE 1000 MG: 1 INJECTION, POWDER, FOR SOLUTION INTRAMUSCULAR; INTRAVENOUS at 04:11

## 2021-06-06 RX ADMIN — PANTOPRAZOLE SODIUM 40 MG: 40 TABLET, DELAYED RELEASE ORAL at 06:35

## 2021-06-06 RX ADMIN — AMLODIPINE BESYLATE 10 MG: 10 TABLET ORAL at 11:36

## 2021-06-06 RX ADMIN — IPRATROPIUM BROMIDE AND ALBUTEROL SULFATE 1 AMPULE: 2.5; .5 SOLUTION RESPIRATORY (INHALATION) at 16:01

## 2021-06-06 RX ADMIN — PANTOPRAZOLE SODIUM 40 MG: 40 TABLET, DELAYED RELEASE ORAL at 17:12

## 2021-06-06 RX ADMIN — PAROXETINE HYDROCHLORIDE 40 MG: 20 TABLET, FILM COATED ORAL at 11:36

## 2021-06-06 RX ADMIN — GUAIFENESIN 600 MG: 600 TABLET, EXTENDED RELEASE ORAL at 11:36

## 2021-06-06 RX ADMIN — IPRATROPIUM BROMIDE AND ALBUTEROL SULFATE 1 AMPULE: 2.5; .5 SOLUTION RESPIRATORY (INHALATION) at 21:01

## 2021-06-06 RX ADMIN — AZITHROMYCIN DIHYDRATE 500 MG: 500 INJECTION, POWDER, LYOPHILIZED, FOR SOLUTION INTRAVENOUS at 04:46

## 2021-06-06 RX ADMIN — IPRATROPIUM BROMIDE AND ALBUTEROL SULFATE 1 AMPULE: 2.5; .5 SOLUTION RESPIRATORY (INHALATION) at 11:40

## 2021-06-06 RX ADMIN — IPRATROPIUM BROMIDE AND ALBUTEROL SULFATE 1 AMPULE: 2.5; .5 SOLUTION RESPIRATORY (INHALATION) at 07:45

## 2021-06-06 RX ADMIN — ENOXAPARIN SODIUM 40 MG: 40 INJECTION SUBCUTANEOUS at 11:37

## 2021-06-06 RX ADMIN — LEVOTHYROXINE SODIUM 25 MCG: 25 TABLET ORAL at 06:35

## 2021-06-06 ASSESSMENT — PAIN SCALES - GENERAL: PAINLEVEL_OUTOF10: 0

## 2021-06-06 NOTE — PLAN OF CARE
Problem: Discharge Planning:  Goal: Discharged to appropriate level of care  Outcome: Ongoing  Goal: Participates in care planning  Outcome: Ongoing     Problem: Airway Clearance - Ineffective:  Goal: Clear lung sounds  Outcome: Ongoing  Goal: Ability to maintain a clear airway will improve  Outcome: Ongoing     Problem: Fluid Volume - Deficit:  Goal: Achieves intake and output within specified parameters  Outcome: Ongoing     Problem: Gas Exchange - Impaired:  Goal: Levels of oxygenation will improve  Outcome: Ongoing     Problem: Hyperthermia:  Goal: Ability to maintain a body temperature in the normal range will improve  Outcome: Ongoing     Problem: Tobacco Use:  Goal: Will participate in inpatient tobacco-use cessation counseling  Outcome: Ongoing     Problem: Falls - Risk of:  Goal: Will remain free from falls  Outcome: Ongoing  Goal: Absence of physical injury  Outcome: Ongoing     Problem: Skin Integrity:  Goal: Will show no infection signs and symptoms  Outcome: Ongoing  Goal: Absence of new skin breakdown  Outcome: Ongoing

## 2021-06-06 NOTE — PROGRESS NOTES
8.3   GLUCOSE 146* 133* 143*      ABG:  No results for input(s): PH, PO2ART, BAF7KPG, HCO3, BEART, O2SAT in the last 72 hours. Lab Results   Component Value Date    PROBNP 31.09 06/04/2021    PROBNP 50.93 06/01/2021    PROBNP 20.25 08/25/2020     No results found for: [facility]    Radiology Review:  Pertinent images / reports were reviewed as a part of this visit. Assessment:     Patient Active Problem List   Diagnosis    Pneumonia    Cerebral palsy, hemiplegic (HCC)    Hypoxia    Seizure disorder ([facility] [address]    Gait disturbance    Pneumonia of both lungs due to infectious organism    Acute upper GI bleed    Cecal polyp    Sebaceous cyst       Plan:   1. Overall the patient has improved. 2. CXR. 3. [address] Activity.     Magaly Lara MD MD  6/6/2021  12:19 PM

## 2021-06-07 LAB
ALBUMIN SERPL-MCNC: 3.5 GM/DL (ref 3.4–5)
ALP BLD-CCNC: 74 IU/L (ref 40–128)
ALT SERPL-CCNC: 53 U/L (ref 10–40)
ANION GAP SERPL CALCULATED.3IONS-SCNC: 11 MMOL/L (ref 4–16)
AST SERPL-CCNC: 34 IU/L (ref 15–37)
BASOPHILS ABSOLUTE: 0.1 K/CU MM
BASOPHILS RELATIVE PERCENT: 0.7 % (ref 0–1)
BILIRUB SERPL-MCNC: 0.3 MG/DL (ref 0–1)
BUN BLDV-MCNC: 12 MG/DL (ref 6–23)
CALCIUM SERPL-MCNC: 8.3 MG/DL (ref 8.3–10.6)
CHLORIDE BLD-SCNC: 103 MMOL/L (ref 99–110)
CO2: 27 MMOL/L (ref 21–32)
CREAT SERPL-MCNC: 0.8 MG/DL (ref 0.9–1.3)
CULTURE: NORMAL
CULTURE: NORMAL
DIFFERENTIAL TYPE: ABNORMAL
EOSINOPHILS ABSOLUTE: 1.2 K/CU MM
EOSINOPHILS RELATIVE PERCENT: 11.8 % (ref 0–3)
GFR AFRICAN AMERICAN: >60 ML/MIN/1.73M2
GFR NON-AFRICAN AMERICAN: >60 ML/MIN/1.73M2
GLUCOSE BLD-MCNC: 133 MG/DL (ref 70–99)
HCT VFR BLD CALC: 42.3 % (ref 42–52)
HEMOGLOBIN: 14.3 GM/DL (ref 13.5–18)
HIGH SENSITIVE C-REACTIVE PROTEIN: 36 MG/L
IMMATURE NEUTROPHIL %: 0.3 % (ref 0–0.43)
LYMPHOCYTES ABSOLUTE: 2.2 K/CU MM
LYMPHOCYTES RELATIVE PERCENT: 22 % (ref 24–44)
Lab: NORMAL
Lab: NORMAL
MAGNESIUM: 2.2 MG/DL (ref 1.8–2.4)
MCH RBC QN AUTO: 31.7 PG (ref 27–31)
MCHC RBC AUTO-ENTMCNC: 33.8 % (ref 32–36)
MCV RBC AUTO: 93.8 FL (ref 78–100)
MONOCYTES ABSOLUTE: 1.1 K/CU MM
MONOCYTES RELATIVE PERCENT: 10.6 % (ref 0–4)
NUCLEATED RBC %: 0 %
PDW BLD-RTO: 11.7 % (ref 11.7–14.9)
PLATELET # BLD: 348 K/CU MM (ref 140–440)
PMV BLD AUTO: 9.3 FL (ref 7.5–11.1)
POTASSIUM SERPL-SCNC: 3.5 MMOL/L (ref 3.5–5.1)
PROCALCITONIN: 0.13
RBC # BLD: 4.51 M/CU MM (ref 4.6–6.2)
SEGMENTED NEUTROPHILS ABSOLUTE COUNT: 5.5 K/CU MM
SEGMENTED NEUTROPHILS RELATIVE PERCENT: 54.6 % (ref 36–66)
SODIUM BLD-SCNC: 141 MMOL/L (ref 135–145)
SPECIMEN: NORMAL
SPECIMEN: NORMAL
TOTAL IMMATURE NEUTOROPHIL: 0.03 K/CU MM
TOTAL NUCLEATED RBC: 0 K/CU MM
TOTAL PROTEIN: 5.7 GM/DL (ref 6.4–8.2)
WBC # BLD: 10.2 K/CU MM (ref 4–10.5)

## 2021-06-07 PROCEDURE — 80053 COMPREHEN METABOLIC PANEL: CPT

## 2021-06-07 PROCEDURE — 2580000003 HC RX 258: Performed by: INTERNAL MEDICINE

## 2021-06-07 PROCEDURE — 1200000000 HC SEMI PRIVATE

## 2021-06-07 PROCEDURE — 6360000002 HC RX W HCPCS: Performed by: INTERNAL MEDICINE

## 2021-06-07 PROCEDURE — 6370000000 HC RX 637 (ALT 250 FOR IP): Performed by: INTERNAL MEDICINE

## 2021-06-07 PROCEDURE — 94761 N-INVAS EAR/PLS OXIMETRY MLT: CPT

## 2021-06-07 PROCEDURE — 36415 COLL VENOUS BLD VENIPUNCTURE: CPT

## 2021-06-07 PROCEDURE — 84145 PROCALCITONIN (PCT): CPT

## 2021-06-07 PROCEDURE — 2700000000 HC OXYGEN THERAPY PER DAY

## 2021-06-07 PROCEDURE — 83735 ASSAY OF MAGNESIUM: CPT

## 2021-06-07 PROCEDURE — 85025 COMPLETE CBC W/AUTO DIFF WBC: CPT

## 2021-06-07 PROCEDURE — 86141 C-REACTIVE PROTEIN HS: CPT

## 2021-06-07 PROCEDURE — 94640 AIRWAY INHALATION TREATMENT: CPT

## 2021-06-07 RX ORDER — POTASSIUM CHLORIDE 7.45 MG/ML
10 INJECTION INTRAVENOUS PRN
Status: DISCONTINUED | OUTPATIENT
Start: 2021-06-07 | End: 2021-06-09 | Stop reason: HOSPADM

## 2021-06-07 RX ORDER — POTASSIUM CHLORIDE 20 MEQ/1
40 TABLET, EXTENDED RELEASE ORAL PRN
Status: DISCONTINUED | OUTPATIENT
Start: 2021-06-07 | End: 2021-06-09 | Stop reason: HOSPADM

## 2021-06-07 RX ORDER — FUROSEMIDE 10 MG/ML
20 INJECTION INTRAMUSCULAR; INTRAVENOUS ONCE
Status: COMPLETED | OUTPATIENT
Start: 2021-06-07 | End: 2021-06-07

## 2021-06-07 RX ADMIN — PANTOPRAZOLE SODIUM 40 MG: 40 TABLET, DELAYED RELEASE ORAL at 15:54

## 2021-06-07 RX ADMIN — PANTOPRAZOLE SODIUM 40 MG: 40 TABLET, DELAYED RELEASE ORAL at 06:43

## 2021-06-07 RX ADMIN — LEVETIRACETAM 500 MG: 500 TABLET, FILM COATED ORAL at 21:11

## 2021-06-07 RX ADMIN — SODIUM CHLORIDE, PRESERVATIVE FREE 10 ML: 5 INJECTION INTRAVENOUS at 21:11

## 2021-06-07 RX ADMIN — IPRATROPIUM BROMIDE AND ALBUTEROL SULFATE 1 AMPULE: 2.5; .5 SOLUTION RESPIRATORY (INHALATION) at 19:49

## 2021-06-07 RX ADMIN — AMLODIPINE BESYLATE 10 MG: 10 TABLET ORAL at 08:31

## 2021-06-07 RX ADMIN — IPRATROPIUM BROMIDE AND ALBUTEROL SULFATE 1 AMPULE: 2.5; .5 SOLUTION RESPIRATORY (INHALATION) at 07:06

## 2021-06-07 RX ADMIN — GUAIFENESIN 600 MG: 600 TABLET, EXTENDED RELEASE ORAL at 08:30

## 2021-06-07 RX ADMIN — ONDANSETRON 4 MG: 2 INJECTION INTRAMUSCULAR; INTRAVENOUS at 10:55

## 2021-06-07 RX ADMIN — AZITHROMYCIN DIHYDRATE 500 MG: 500 INJECTION, POWDER, LYOPHILIZED, FOR SOLUTION INTRAVENOUS at 05:13

## 2021-06-07 RX ADMIN — PAROXETINE HYDROCHLORIDE 40 MG: 20 TABLET, FILM COATED ORAL at 08:31

## 2021-06-07 RX ADMIN — CEFTRIAXONE 1000 MG: 1 INJECTION, POWDER, FOR SOLUTION INTRAMUSCULAR; INTRAVENOUS at 04:11

## 2021-06-07 RX ADMIN — IPRATROPIUM BROMIDE AND ALBUTEROL SULFATE 1 AMPULE: 2.5; .5 SOLUTION RESPIRATORY (INHALATION) at 10:51

## 2021-06-07 RX ADMIN — GUAIFENESIN 600 MG: 600 TABLET, EXTENDED RELEASE ORAL at 21:11

## 2021-06-07 RX ADMIN — IPRATROPIUM BROMIDE AND ALBUTEROL SULFATE 1 AMPULE: 2.5; .5 SOLUTION RESPIRATORY (INHALATION) at 15:03

## 2021-06-07 RX ADMIN — FUROSEMIDE 20 MG: 10 INJECTION, SOLUTION INTRAMUSCULAR; INTRAVENOUS at 12:16

## 2021-06-07 RX ADMIN — LEVOTHYROXINE SODIUM 25 MCG: 25 TABLET ORAL at 06:43

## 2021-06-07 RX ADMIN — ENOXAPARIN SODIUM 40 MG: 40 INJECTION SUBCUTANEOUS at 08:31

## 2021-06-07 RX ADMIN — LEVETIRACETAM 500 MG: 500 TABLET, FILM COATED ORAL at 08:30

## 2021-06-07 RX ADMIN — SODIUM CHLORIDE, PRESERVATIVE FREE 10 ML: 5 INJECTION INTRAVENOUS at 08:31

## 2021-06-07 ASSESSMENT — PAIN SCALES - GENERAL
PAINLEVEL_OUTOF10: 0

## 2021-06-07 NOTE — PROGRESS NOTES
Hospitalist Progress Note      Name:  Jessica Fragoso /Age/Sex: 1965  (63 y.o. male)   MRN & CSN:  [id number] & [id number] Admission Date/Time: 2021 10:22 PM   Location:  -A PCP: Major Duverney, MD         Hospital Day: 7    Assessment and Plan:   Jessica Fragoso is a 63 y.o. male p/w acute hypoxemic respiratory failure    Acute hypoxic respiratory failure  Community Acquired Pneumonia  Parainfluenza Infection  - 4L and continues to improve; cough improved; will wean down today  - Continue Azithromycin/Ceftriaxone  - Continue breathing treatments   - Inflammatory markers trending down daily; reviewed  - Urine strep/legionella negative  - Symptomatic treatment PRN and scheduled Guiafenasin  - Appreciate pulmonology assistance      Mechanical Fall  - Renell Fritz in bathroom on   - CT H with no acute abnormality and doing well    Hypokalemia  - Resolved    Type 2 diabetes- uncontrolled  - ISS, TIDAC finger stick  - Hypoglycemic protocol     Possible emphysematous disease  - Continue inhaler PRN  - Pulmonology following, appreciate recs     New diagnosis hepatic steatosis  - LFTS wnl, f/u outpatient     Hypertension-labile, uncontrolled  -Patient is on Norvasc     Depression-patient is on Paxil.     Seizure disorder  Keppra 500 mg twice daily p.o. twice daily     History of cerebral palsy    Plan for hopeful discharge tomorrow    Diet ADULT DIET; Regular   DVT Prophylaxis [] Lovenox, []  Heparin, [] SCDs, [] Ambulation   GI Prophylaxis [] PPI,  [] H2 Blocker,  [] Carafate,  [] Diet/Tube Feeds   Code Status Full Code   Disposition Patient requires continued admission due to    MDM [] Low, [] Moderate,[]  High  Patient's risk as above due t     History of Present Illness:     Continues to improve; cough improved. Will continue to wean oxygen today and hopeful discharge tomorow    Objective:        Intake/Output Summary (Last 24 hours) at 2021 1336  Last data filed at 2021 1128  Gross per 24 hour   Intake 530 ml   Output 350 ml   Net 180 ml      Vitals:   Vitals:    06/07/21 1128   BP: 134/78   Pulse: 92   Resp: 18   Temp: 97.6 °F (36.4 °C)   SpO2: 98%     Physical Exam:   GEN Awake male, lying in bed; no distress  EYES Pupils are equally round. No scleral erythema, discharge, or conjunctivitis. NECK Supple, no apparent thyromegaly or masses. RESP Still with rhochi but down to 4L today  CARDIO/VASC S1/S2 auscultated. Regular rate without appreciable murmurs, rubs, or gallops. GI Abdomen is soft without significant tenderness, masses, or guarding   No costovertebral angle tenderness  MSK No gross joint deformities. SKIN Normal coloration, warm, dry. NEURO Cranial nerves appear grossly intact, normal speech, no lateralizing weakness.   PSYCH Awake, alert, oriented    Medications:   Medications:    ipratropium-albuterol  1 ampule Inhalation Q4H WA    guaiFENesin  600 mg Oral BID    amLODIPine  10 mg Oral QAM    levETIRAcetam  500 mg Oral BID    levothyroxine  25 mcg Oral Daily    pantoprazole  40 mg Oral BID AC    PARoxetine  40 mg Oral QAM    sodium chloride flush  5-40 mL Intravenous 2 times per day    enoxaparin  40 mg Subcutaneous Daily    cefTRIAXone (ROCEPHIN) IV  1,000 mg Intravenous Q24H    And    azithromycin  500 mg Intravenous Q24H      Infusions:    sodium chloride       PRN Meds: potassium chloride, 40 mEq, PRN   Or  potassium alternative oral replacement, 40 mEq, PRN   Or  potassium chloride, 10 mEq, PRN  albuterol sulfate HFA, 2 puff, Q2H PRN  sodium chloride flush, 5-40 mL, PRN  sodium chloride flush, 5-40 mL, PRN  sodium chloride, 25 mL, PRN  ondansetron, 4 mg, Q8H PRN   Or  ondansetron, 4 mg, Q6H PRN  polyethylene glycol, 17 g, Daily PRN  acetaminophen, 650 mg, Q6H PRN   Or  acetaminophen, 650 mg, Q6H PRN          Electronically signed by Maribel Carolina MD on 6/7/2021 at 1:36 PM

## 2021-06-07 NOTE — PROGRESS NOTES
133* 143* 133*      ABG:  No results for input(s): PH, PO2ART, AUW6AMM, HCO3, BEART, O2SAT in the last 72 hours. Lab Results   Component Value Date    PROBNP 31.09 06/04/2021    PROBNP 50.93 06/01/2021    PROBNP 20.25 08/25/2020     No results found for: [facility]    Radiology Review:  Pertinent images / reports were reviewed as a part of this visit. Assessment:     Patient Active Problem List   Diagnosis    Pneumonia    Cerebral palsy, hemiplegic (HCC)    Hypoxia    Seizure disorder ([facility] [address]    Gait disturbance    Pneumonia of both lungs due to infectious organism    Acute upper GI bleed    Cecal polyp    Sebaceous cyst       Plan:   1. Overall the patient is better. 2. [address] Activity. 4. Wean FiO2.   Delmi Vásquez MD MD  6/7/2021  10:40 AM

## 2021-06-08 LAB
ALBUMIN SERPL-MCNC: 3.9 GM/DL (ref 3.4–5)
ALP BLD-CCNC: 80 IU/L (ref 40–129)
ALT SERPL-CCNC: 66 U/L (ref 10–40)
ANION GAP SERPL CALCULATED.3IONS-SCNC: 13 MMOL/L (ref 4–16)
AST SERPL-CCNC: 36 IU/L (ref 15–37)
BASOPHILS ABSOLUTE: 0.1 K/CU MM
BASOPHILS RELATIVE PERCENT: 0.8 % (ref 0–1)
BILIRUB SERPL-MCNC: 0.3 MG/DL (ref 0–1)
BUN BLDV-MCNC: 11 MG/DL (ref 6–23)
CALCIUM SERPL-MCNC: 8.5 MG/DL (ref 8.3–10.6)
CHLORIDE BLD-SCNC: 102 MMOL/L (ref 99–110)
CO2: 25 MMOL/L (ref 21–32)
CREAT SERPL-MCNC: 1 MG/DL (ref 0.9–1.3)
DIFFERENTIAL TYPE: ABNORMAL
EOSINOPHILS ABSOLUTE: 1.2 K/CU MM
EOSINOPHILS RELATIVE PERCENT: 11.1 % (ref 0–3)
GFR AFRICAN AMERICAN: >60 ML/MIN/1.73M2
GFR NON-AFRICAN AMERICAN: >60 ML/MIN/1.73M2
GLUCOSE BLD-MCNC: 115 MG/DL (ref 70–99)
HCT VFR BLD CALC: 44.5 % (ref 42–52)
HEMOGLOBIN: 14.5 GM/DL (ref 13.5–18)
HIGH SENSITIVE C-REACTIVE PROTEIN: 18.4 MG/L
IMMATURE NEUTROPHIL %: 0.5 % (ref 0–0.43)
LYMPHOCYTES ABSOLUTE: 2.2 K/CU MM
LYMPHOCYTES RELATIVE PERCENT: 20.7 % (ref 24–44)
MAGNESIUM: 2.2 MG/DL (ref 1.8–2.4)
MCH RBC QN AUTO: 30.5 PG (ref 27–31)
MCHC RBC AUTO-ENTMCNC: 32.6 % (ref 32–36)
MCV RBC AUTO: 93.5 FL (ref 78–100)
MONOCYTES ABSOLUTE: 1.4 K/CU MM
MONOCYTES RELATIVE PERCENT: 12.8 % (ref 0–4)
NUCLEATED RBC %: 0 %
PDW BLD-RTO: 11.5 % (ref 11.7–14.9)
PLATELET # BLD: 418 K/CU MM (ref 140–440)
PMV BLD AUTO: 9.2 FL (ref 7.5–11.1)
POTASSIUM SERPL-SCNC: 3.3 MMOL/L (ref 3.5–5.1)
PRO-BNP: 35.21 PG/ML
PROCALCITONIN: 0.14
RBC # BLD: 4.76 M/CU MM (ref 4.6–6.2)
SEGMENTED NEUTROPHILS ABSOLUTE COUNT: 5.8 K/CU MM
SEGMENTED NEUTROPHILS RELATIVE PERCENT: 54.1 % (ref 36–66)
SODIUM BLD-SCNC: 140 MMOL/L (ref 135–145)
TOTAL IMMATURE NEUTOROPHIL: 0.05 K/CU MM
TOTAL NUCLEATED RBC: 0 K/CU MM
TOTAL PROTEIN: 6.6 GM/DL (ref 6.4–8.2)
WBC # BLD: 10.8 K/CU MM (ref 4–10.5)

## 2021-06-08 PROCEDURE — 85025 COMPLETE CBC W/AUTO DIFF WBC: CPT

## 2021-06-08 PROCEDURE — 6370000000 HC RX 637 (ALT 250 FOR IP): Performed by: INTERNAL MEDICINE

## 2021-06-08 PROCEDURE — 80053 COMPREHEN METABOLIC PANEL: CPT

## 2021-06-08 PROCEDURE — 83735 ASSAY OF MAGNESIUM: CPT

## 2021-06-08 PROCEDURE — 83880 ASSAY OF NATRIURETIC PEPTIDE: CPT

## 2021-06-08 PROCEDURE — 2580000003 HC RX 258: Performed by: INTERNAL MEDICINE

## 2021-06-08 PROCEDURE — 86141 C-REACTIVE PROTEIN HS: CPT

## 2021-06-08 PROCEDURE — 94640 AIRWAY INHALATION TREATMENT: CPT

## 2021-06-08 PROCEDURE — 36415 COLL VENOUS BLD VENIPUNCTURE: CPT

## 2021-06-08 PROCEDURE — 84145 PROCALCITONIN (PCT): CPT

## 2021-06-08 PROCEDURE — 80048 BASIC METABOLIC PNL TOTAL CA: CPT

## 2021-06-08 PROCEDURE — 6360000002 HC RX W HCPCS: Performed by: INTERNAL MEDICINE

## 2021-06-08 PROCEDURE — 94761 N-INVAS EAR/PLS OXIMETRY MLT: CPT

## 2021-06-08 PROCEDURE — 1200000000 HC SEMI PRIVATE

## 2021-06-08 RX ORDER — POTASSIUM CHLORIDE 20 MEQ/1
20 TABLET, EXTENDED RELEASE ORAL 2 TIMES DAILY WITH MEALS
Status: COMPLETED | OUTPATIENT
Start: 2021-06-08 | End: 2021-06-09

## 2021-06-08 RX ORDER — IPRATROPIUM BROMIDE AND ALBUTEROL SULFATE 2.5; .5 MG/3ML; MG/3ML
1 SOLUTION RESPIRATORY (INHALATION) EVERY 4 HOURS PRN
Status: DISCONTINUED | OUTPATIENT
Start: 2021-06-08 | End: 2021-06-09 | Stop reason: HOSPADM

## 2021-06-08 RX ADMIN — IPRATROPIUM BROMIDE AND ALBUTEROL SULFATE 1 AMPULE: 2.5; .5 SOLUTION RESPIRATORY (INHALATION) at 08:26

## 2021-06-08 RX ADMIN — AZITHROMYCIN DIHYDRATE 500 MG: 500 INJECTION, POWDER, LYOPHILIZED, FOR SOLUTION INTRAVENOUS at 05:50

## 2021-06-08 RX ADMIN — ENOXAPARIN SODIUM 40 MG: 40 INJECTION SUBCUTANEOUS at 09:51

## 2021-06-08 RX ADMIN — SODIUM CHLORIDE, PRESERVATIVE FREE 10 ML: 5 INJECTION INTRAVENOUS at 20:32

## 2021-06-08 RX ADMIN — POTASSIUM CHLORIDE 20 MEQ: 1500 TABLET, EXTENDED RELEASE ORAL at 17:14

## 2021-06-08 RX ADMIN — PANTOPRAZOLE SODIUM 40 MG: 40 TABLET, DELAYED RELEASE ORAL at 05:50

## 2021-06-08 RX ADMIN — AMLODIPINE BESYLATE 10 MG: 10 TABLET ORAL at 09:46

## 2021-06-08 RX ADMIN — CEFTRIAXONE 1000 MG: 1 INJECTION, POWDER, FOR SOLUTION INTRAMUSCULAR; INTRAVENOUS at 04:28

## 2021-06-08 RX ADMIN — GUAIFENESIN 600 MG: 600 TABLET, EXTENDED RELEASE ORAL at 20:32

## 2021-06-08 RX ADMIN — LEVETIRACETAM 500 MG: 500 TABLET, FILM COATED ORAL at 09:46

## 2021-06-08 RX ADMIN — LEVOTHYROXINE SODIUM 25 MCG: 25 TABLET ORAL at 05:50

## 2021-06-08 RX ADMIN — PAROXETINE HYDROCHLORIDE 40 MG: 20 TABLET, FILM COATED ORAL at 09:48

## 2021-06-08 RX ADMIN — GUAIFENESIN 600 MG: 600 TABLET, EXTENDED RELEASE ORAL at 09:48

## 2021-06-08 RX ADMIN — LEVETIRACETAM 500 MG: 500 TABLET, FILM COATED ORAL at 20:32

## 2021-06-08 RX ADMIN — SODIUM CHLORIDE, PRESERVATIVE FREE 10 ML: 5 INJECTION INTRAVENOUS at 09:51

## 2021-06-08 RX ADMIN — PANTOPRAZOLE SODIUM 40 MG: 40 TABLET, DELAYED RELEASE ORAL at 17:14

## 2021-06-08 RX ADMIN — IPRATROPIUM BROMIDE AND ALBUTEROL SULFATE 1 AMPULE: 2.5; .5 SOLUTION RESPIRATORY (INHALATION) at 12:00

## 2021-06-08 ASSESSMENT — PAIN SCALES - GENERAL: PAINLEVEL_OUTOF10: 0

## 2021-06-08 NOTE — PROGRESS NOTES
Gretta Dodd MD, [facility]                Internal Medicine Hospitalist             Daily Progress  Note   Subjective:     Chief Complaint   Patient presents with    Cough    Nasal Congestion    Pharyngitis     Mr. Lopez Pair of nil, still on step down. Objective:    BP (!) 147/84   Pulse 82   Temp 98 °F (36.7 °C) (Oral)   Resp 21   Ht 5' 7\" (1.702 m)   Wt 170 lb 10.2 oz (77.4 kg)   SpO2 90%   BMI 26.73 kg/m²    No intake or output data in the 24 hours ending 06/08/21 1227   Physical Exam:  Heart: distant heart sounds. Lungs: Mostly clear to auscultation, decreased breath sounds at bases. No wheezes appreciated no crackles heard. Poor air movement L>R. Abdomen: Soft, non distended. Bowel sounds appreciated. No obvious liver or spleen enlargement. Non tender, no rebound noted. Extremities: Non tender, no swelling noted, strength 5/5 both legs. CNS: Grossly intact. Labs:  CBC:   Lab Results   Component Value Date    WBC 10.8 06/08/2021    RBC 4.76 06/08/2021    HGB 14.5 06/08/2021    HCT 44.5 06/08/2021    MCV 93.5 06/08/2021    MCH 30.5 06/08/2021    MCHC 32.6 06/08/2021    RDW 11.5 06/08/2021     06/08/2021    MPV 9.2 06/08/2021     CMP:    Lab Results   Component Value Date     06/08/2021    K 3.3 06/08/2021     06/08/2021    CO2 25 06/08/2021    BUN 11 06/08/2021    CREATININE 1.0 06/08/2021    GFRAA >60 06/08/2021    LABGLOM >60 06/08/2021    GLUCOSE 115 06/08/2021    PROT 6.6 06/08/2021    LABALBU 3.9 06/08/2021    CALCIUM 8.5 06/08/2021    BILITOT 0.3 06/08/2021    ALKPHOS 80 06/08/2021    AST 36 06/08/2021    ALT 66 06/08/2021     No results for input(s): TROPONINT in the last 72 hours.   Lab Results   Component Value Date    TSHHS 2.730 03/11/2016         sodium chloride        ipratropium-albuterol  1 ampule Inhalation Q4H WA    guaiFENesin  600 mg Oral BID    amLODIPine  10 mg Oral QAM    levETIRAcetam  500 mg Oral BID    levothyroxine  25 mcg Oral Daily    pantoprazole  40 mg Oral BID AC    PARoxetine  40 mg Oral QAM    sodium chloride flush  5-40 mL Intravenous 2 times per day    enoxaparin  40 mg Subcutaneous Daily    cefTRIAXone (ROCEPHIN) IV  1,000 mg Intravenous Q24H    And    azithromycin  500 mg Intravenous Q24H         Assessment:       Patient Active Problem List    Diagnosis Date Noted    Cecal polyp 01/16/2020    Sebaceous cyst 01/16/2020    Acute upper GI bleed 12/01/2019    Pneumonia of both lungs due to infectious organism     Cerebral palsy, hemiplegic ([facility] [address] 03/10/2016    Hypoxia 03/10/2016    Seizure disorder ([facility] [address] 03/10/2016    Gait disturbance 03/10/2016    Pneumonia 03/08/2016       Plan:     Problems being addressed this admission:      Acute hypoxic respiratory failure / PNA bilateral  6/7/21-Community Acquired Pneumonia Parainfluenza Infection- 4L and continues to improve; cough improved; will wean down today- Continue Azithromycin/Ceftriaxone- Continue breathing treatments - Inflammatory markers trending down daily; reviewed- Urine strep/legionella negative- Symptomatic treatment PRN and scheduled [facility] pulmonology assistance  6/8/21- He has very poor air movement left lung and so will need to stay in house and continue as before. Incentive spirometery.         Mechanical Fall  6/7/21Trenna Puffer in bathroom on 6/5- CT H with no acute abnormality and doing well     Hypokalemia  6/7/21- Resolved     Type 2 diabetes- uncontrolled ? ?  6/7/21- ISS, TIDAC finger stick- Hypoglycemic protocol  6/8/21- I have not known him to be a diabetic and so will check A1c. Suars are well controlled.      New diagnosis hepatic steatosis  6/7/21- LFTS wnl, f/u outpatient    HTN   6/7/21-Patient is on Norvasc Hypertension-labile, uncontrolled  6/8/21- b/p is 147/84 today continue to monitor.      Depression  6/7/21-patient is on Paxil.     Seizure disorder  6/7/21-Keppra 500 mg twice daily p.o. twice daily  6/8/21- no new seizures.    History of cerebral palsy    Consultants:  Pulmonary    General Orders:  Repeat basic labs again in am.  I have explained to the patient and discussed with him/her the treatment plan. Transfer to regular floor. Nor ready to be discharged today.    Alex Graf MD, [address]

## 2021-06-08 NOTE — PROGRESS NOTES
Comprehensive Nutrition Assessment    Type and Reason for Visit:  Initial (length of stay)    Nutrition Recommendations/Plan:   Continue general diet at this time  Will continue to follow up during stay     Nutrition Assessment:  Admit with cough, pharyngitis, pneumonia. Hx cerebral palsy, lives in group home. Has been on regular diet during stay, recent meal intake %. No reported weight loss, BMI 26.7. Low nutrition risk at this time. Malnutrition Assessment:  Malnutrition Status:  Insufficient data    Context:  Acute Illness       Estimated Daily Nutrient Needs:  Energy (kcal):  9465-9976 (22-25 gypsy/kg); Weight Used for Energy Requirements:  Current     Protein (g):  77-92 (1-1.2 g/kg); Weight Used for Protein Requirements:  Current        Fluid (ml/day):  1900; Method Used for Fluid Requirements:  1 ml/kcal      Nutrition Related Findings:  asleep in chair on visit. Wounds:  None       Current Nutrition Therapies:    ADULT DIET; Regular    Anthropometric Measures:  · Height: 5' 7\" (170.2 cm)  · Current Body Weight: 170 lb 10.2 oz (77.4 kg)   · Usual Body Weight:  (stated weights in hx)     · Ideal Body Weight: 148 lbs; % Ideal Body Weight 115.3 %   · BMI: 26.7  · Adjusted Body Weight:  ; No Adjustment   · BMI Categories: Overweight (BMI 25.0-29. 9)       Nutrition Diagnosis:   · No nutrition diagnosis at this time related to   as evidenced by        Nutrition Interventions:   Food and/or Nutrient Delivery:  Continue Current Diet, Snacks (Comment)  Nutrition Education/Counseling:  No recommendation at this time   Coordination of Nutrition Care:  Continue to monitor while inpatient    Goals:  Patient will continue to consume at least 75% at meals during stay       Nutrition Monitoring and Evaluation:   Behavioral-Environmental Outcomes:  None Identified   Food/Nutrient Intake Outcomes:  Diet Advancement/Tolerance, Food and Nutrient Intake  Physical Signs/Symptoms Outcomes:  Biochemical Data, Meal Time Behavior, Skin, Weight     Discharge Planning:    Continue current diet     Electronically signed by Carin Neumann RD, LD on 6/8/21 at 2:58 PM EDT    Contact: [phone]

## 2021-06-08 NOTE — PROGRESS NOTES
Pulmonary and Critical Care  Progress Note    Subjective: The patient has improved. Shortness of breath has improved. Chest pain none. Addressing respiratory complaints Patient is negative for  hemoptysis and cyanosis. CONSTITUTIONAL:  negative for fevers and chills. Past Medical History:     has a past medical history of Cerebral palsy ([facility] [address] Depression, Hypertension, and Seizures ([facility] [address] has a past surgical history that includes Eye surgery; Abdomen surgery; and Upper gastrointestinal endoscopy (N/A, 12/3/2019). reports that he has never smoked. He has never used smokeless tobacco. He reports that he does not drink alcohol and does not use drugs. Family history:  family history is not on file. No Known Allergies  Social History:    Reviewed; no changes    Objective:   PHYSICAL EXAM:        VITALS:  BP (!) 147/84   Pulse 82   Temp 98 °F (36.7 °C) (Oral)   Resp 21   Ht 5' 7\" (1.702 m)   Wt 170 lb 10.2 oz (77.4 kg)   SpO2 90%   BMI 26.73 kg/m²     24HR INTAKE/OUTPUT:      Intake/Output Summary (Last 24 hours) at 6/8/2021 1125  Last data filed at 6/7/2021 1128  Gross per 24 hour   Intake 160 ml   Output --   Net 160 ml       CONSTITUTIONAL:  awake, alert, cooperative, no apparent distress, and appears stated age  LUNGS:  decreased breath sounds, occ basilar crackles. CARDIOVASCULAR:  normal S1 and S2 and negative JVD  ABD:Abdomen soft, non-tender. BS normal. No masses,  No organomegaly  NEURO:Alert.   DATA:    CBC:  Recent Labs     06/06/21  0504 06/07/21  0552 06/08/21  0249   WBC 10.0 10.2 10.8*   RBC 4.59* 4.51* 4.76   HGB 14.4 14.3 14.5   HCT 42.6 42.3 44.5    348 418   MCV 92.8 93.8 93.5   MCH 31.4* 31.7* 30.5   MCHC 33.8 33.8 32.6   RDW 11.9 11.7 11.5*   SEGSPCT 53.7 54.6 54.1      BMP:  Recent Labs     06/06/21  0504 06/07/21  0552 06/08/21  0249    141 140   K 3.6 3.5 3.3*    103 102   CO2 27 27 25   BUN 11 12 11   CREATININE 0.7* 0.8* 1.0   CALCIUM 8.3 8.3 8.5 GLUCOSE 143* 133* 115*      ABG:  No results for input(s): PH, PO2ART, CLN7FXP, HCO3, BEART, O2SAT in the last 72 hours. Lab Results   Component Value Date    PROBNP 35.21 06/08/2021    PROBNP 31.09 06/04/2021    PROBNP 50.93 06/01/2021     No results found for: [facility]    Radiology Review:  Pertinent images / reports were reviewed as a part of this visit. Assessment:     Patient Active Problem List   Diagnosis    Pneumonia    Cerebral palsy, hemiplegic (HCC)    Hypoxia    Seizure disorder ([facility] [address]    Gait disturbance    Pneumonia of both lungs due to infectious organism    Acute upper GI bleed    Cecal polyp    Sebaceous cyst       Plan:   1. Overall the patient has improved. 2. Inc. Activity.     [address] [facility] [address], MD MD  6/8/2021  11:25 AM

## 2021-06-09 VITALS
TEMPERATURE: 98.4 F | OXYGEN SATURATION: 94 % | HEART RATE: 89 BPM | BODY MASS INDEX: 26.78 KG/M2 | HEIGHT: 67 IN | SYSTOLIC BLOOD PRESSURE: 140 MMHG | WEIGHT: 170.64 LBS | DIASTOLIC BLOOD PRESSURE: 97 MMHG | RESPIRATION RATE: 28 BRPM

## 2021-06-09 LAB
ALBUMIN SERPL-MCNC: 3.5 GM/DL (ref 3.4–5)
ALP BLD-CCNC: 80 IU/L (ref 40–128)
ALT SERPL-CCNC: 61 U/L (ref 10–40)
ANION GAP SERPL CALCULATED.3IONS-SCNC: 11 MMOL/L (ref 4–16)
AST SERPL-CCNC: 29 IU/L (ref 15–37)
BASOPHILS ABSOLUTE: 0.1 K/CU MM
BASOPHILS RELATIVE PERCENT: 0.9 % (ref 0–1)
BILIRUB SERPL-MCNC: 0.3 MG/DL (ref 0–1)
BUN BLDV-MCNC: 10 MG/DL (ref 6–23)
CALCIUM SERPL-MCNC: 8.7 MG/DL (ref 8.3–10.6)
CHLORIDE BLD-SCNC: 107 MMOL/L (ref 99–110)
CO2: 26 MMOL/L (ref 21–32)
CREAT SERPL-MCNC: 0.8 MG/DL (ref 0.9–1.3)
DIFFERENTIAL TYPE: ABNORMAL
EOSINOPHILS ABSOLUTE: 1.3 K/CU MM
EOSINOPHILS RELATIVE PERCENT: 12.3 % (ref 0–3)
ESTIMATED AVERAGE GLUCOSE: 117 MG/DL
GFR AFRICAN AMERICAN: >60 ML/MIN/1.73M2
GFR NON-AFRICAN AMERICAN: >60 ML/MIN/1.73M2
GLUCOSE BLD-MCNC: 121 MG/DL (ref 70–99)
HBA1C MFR BLD: 5.7 % (ref 4.2–6.3)
HCT VFR BLD CALC: 41.6 % (ref 42–52)
HEMOGLOBIN: 14.7 GM/DL (ref 13.5–18)
HIGH SENSITIVE C-REACTIVE PROTEIN: 11.4 MG/L
IMMATURE NEUTROPHIL %: 0.5 % (ref 0–0.43)
LYMPHOCYTES ABSOLUTE: 1.9 K/CU MM
LYMPHOCYTES RELATIVE PERCENT: 18.7 % (ref 24–44)
MCH RBC QN AUTO: 31.4 PG (ref 27–31)
MCHC RBC AUTO-ENTMCNC: 35.3 % (ref 32–36)
MCV RBC AUTO: 88.9 FL (ref 78–100)
MONOCYTES ABSOLUTE: 1.3 K/CU MM
MONOCYTES RELATIVE PERCENT: 12.4 % (ref 0–4)
PDW BLD-RTO: 11.6 % (ref 11.7–14.9)
PLATELET # BLD: 446 K/CU MM (ref 140–440)
PMV BLD AUTO: 9.1 FL (ref 7.5–11.1)
POTASSIUM SERPL-SCNC: 3.8 MMOL/L (ref 3.5–5.1)
PROCALCITONIN: 0.1
RBC # BLD: 4.68 M/CU MM (ref 4.6–6.2)
SEGMENTED NEUTROPHILS ABSOLUTE COUNT: 5.7 K/CU MM
SEGMENTED NEUTROPHILS RELATIVE PERCENT: 55.2 % (ref 36–66)
SODIUM BLD-SCNC: 144 MMOL/L (ref 135–145)
TOTAL IMMATURE NEUTOROPHIL: 0.05 K/CU MM
TOTAL PROTEIN: 6.1 GM/DL (ref 6.4–8.2)
WBC # BLD: 10.4 K/CU MM (ref 4–10.5)

## 2021-06-09 PROCEDURE — 86141 C-REACTIVE PROTEIN HS: CPT

## 2021-06-09 PROCEDURE — 6360000002 HC RX W HCPCS: Performed by: INTERNAL MEDICINE

## 2021-06-09 PROCEDURE — 6370000000 HC RX 637 (ALT 250 FOR IP): Performed by: INTERNAL MEDICINE

## 2021-06-09 PROCEDURE — 84145 PROCALCITONIN (PCT): CPT

## 2021-06-09 PROCEDURE — 2580000003 HC RX 258: Performed by: INTERNAL MEDICINE

## 2021-06-09 PROCEDURE — 83036 HEMOGLOBIN GLYCOSYLATED A1C: CPT

## 2021-06-09 PROCEDURE — 94760 N-INVAS EAR/PLS OXIMETRY 1: CPT

## 2021-06-09 PROCEDURE — 85025 COMPLETE CBC W/AUTO DIFF WBC: CPT

## 2021-06-09 PROCEDURE — 36415 COLL VENOUS BLD VENIPUNCTURE: CPT

## 2021-06-09 PROCEDURE — 80053 COMPREHEN METABOLIC PANEL: CPT

## 2021-06-09 RX ORDER — AZITHROMYCIN 500 MG/1
500 TABLET, FILM COATED ORAL DAILY
Qty: 1 PACKET | Refills: 0 | Status: SHIPPED | OUTPATIENT
Start: 2021-06-09 | End: 2021-06-12

## 2021-06-09 RX ORDER — ALBUTEROL SULFATE 90 UG/1
2 AEROSOL, METERED RESPIRATORY (INHALATION)
Qty: 1 INHALER | Refills: 0 | Status: SHIPPED | OUTPATIENT
Start: 2021-06-09

## 2021-06-09 RX ADMIN — ENOXAPARIN SODIUM 40 MG: 40 INJECTION SUBCUTANEOUS at 09:00

## 2021-06-09 RX ADMIN — PANTOPRAZOLE SODIUM 40 MG: 40 TABLET, DELAYED RELEASE ORAL at 05:54

## 2021-06-09 RX ADMIN — PAROXETINE HYDROCHLORIDE 40 MG: 20 TABLET, FILM COATED ORAL at 09:00

## 2021-06-09 RX ADMIN — LEVOTHYROXINE SODIUM 25 MCG: 25 TABLET ORAL at 05:54

## 2021-06-09 RX ADMIN — SODIUM CHLORIDE, PRESERVATIVE FREE 10 ML: 5 INJECTION INTRAVENOUS at 09:00

## 2021-06-09 RX ADMIN — AMLODIPINE BESYLATE 10 MG: 10 TABLET ORAL at 09:00

## 2021-06-09 RX ADMIN — LEVETIRACETAM 500 MG: 500 TABLET, FILM COATED ORAL at 09:00

## 2021-06-09 RX ADMIN — AZITHROMYCIN DIHYDRATE 500 MG: 500 INJECTION, POWDER, LYOPHILIZED, FOR SOLUTION INTRAVENOUS at 04:35

## 2021-06-09 RX ADMIN — CEFTRIAXONE 1000 MG: 1 INJECTION, POWDER, FOR SOLUTION INTRAMUSCULAR; INTRAVENOUS at 03:56

## 2021-06-09 RX ADMIN — GUAIFENESIN 600 MG: 600 TABLET, EXTENDED RELEASE ORAL at 09:00

## 2021-06-09 RX ADMIN — POTASSIUM CHLORIDE 20 MEQ: 1500 TABLET, EXTENDED RELEASE ORAL at 08:00

## 2021-06-09 ASSESSMENT — PAIN SCALES - GENERAL
PAINLEVEL_OUTOF10: 0
PAINLEVEL_OUTOF10: 0

## 2021-06-09 NOTE — DISCHARGE SUMMARY
Cordell Mora MD, [address]   Internal Medicine Hospitalist  Discharge Summary    Christiano Arreguin  :  1965  MRN:  [id number]    Admit date:  2021  Discharge date:  2021    Admitting Physician:  Rosio Wang MD    Discharge Diagnoses:    Patient Active Problem List   Diagnosis    Pneumonia    [facility], [facility]iplegic [facility]    Hypoxia    Seizure disorder ([facility] [address]    Gait disturbance    Pneumonia of both lungs due to infectious organism    Acute upper GI bleed    Cecal polyp    Sebaceous cyst       Admission Condition:  fair    Discharged Condition:  stable    Hospital Course:     (copied from admission history)  Christiano Arreguin is a 63 y.o. male admitted for shortness of breath shortness of breath has been going on for couple days, patient states that he has cough, and productive phlegm. Patient states that he has had pneumonia in the past, however his breathing has been much worse this time. Patient states that he does not use oxygen at home, lives in a group home because of the cerebral palsy. Denies any associated chest pain with the shortness of breath, has no nausea, vomiting, diarrhea, open wounds or rashes. Patient states that he has no urinary troubles either. He does complain of fevers, and has some chills.     21- I saw him today and he is doing better o/s he has better air flow in his left lung  Today and so will discharge him home.      Hospital Course:  (copied from last soap)  Acute hypoxic respiratory failure / PNA bilateral  21-Community Acquired Pneumonia Parainfluenza Infection- 4L and continues to improve; cough improved; will wean down today- Continue Azithromycin/Ceftriaxone- Continue breathing treatments - Inflammatory markers trending down daily; reviewed- Urine strep/legionella negative- Symptomatic treatment PRN and scheduled  [facility] pulmonology assistance  21- He has very poor air movement left lung and so will need to stay in house and continue as before. Incentive spirometery.         Mechanical Fall  6/7/21Irl Tara in bathroom on 6/5- CT H with no acute abnormality and doing well     Hypokalemia  6/7/21- Resolved     Type 2 diabetes- uncontrolled ? ?  6/7/21- ISS, TIDAC finger stick- Hypoglycemic protocol  6/8/21- I have not known him to be a diabetic and so will check A1c. Suars are well controlled.      New diagnosis hepatic steatosis  6/7/21- LFTS wnl, f/u outpatient     HTN   6/7/21-Patient is on Norvasc Hypertension-labile, uncontrolled  6/8/21- b/p is 147/84 today continue to monitor.      Depression  6/7/21-patient is on Paxil.     Seizure disorder  6/7/21-Keppra 500 mg twice daily p.o. twice daily  6/8/21- no new seizures.      History of cerebral palsy     Consultants:  Pulmonary    Follow up appointment / plans: Needs to be seen within 7 days by his/her physician, patient to call for an appointment. On examination today  Heart is RRR, Lungs are CTA, abdomen is soft and non tender, CNS is grossly intact. Please see detailed consultation notes and radiology dictations as below. Vitals: Blood pressure (!) 120/92, pulse 83, temperature 98.5 °F (36.9 °C), temperature source Oral, resp. rate 21, height 5' 7\" (1.702 m), weight 170 lb 10.2 oz (77.4 kg), SpO2 93 %.     Discharge Medications:        Medication List      START taking these medications    albuterol sulfate  (90 Base) MCG/ACT inhaler  Inhale 2 puffs into the lungs every 2 hours as needed for Wheezing or Shortness of Breath     azithromycin 500 MG tablet  Commonly known as: Zithromax  Take 1 tablet by mouth daily for 3 days        CONTINUE taking these medications    acetaminophen 325 MG tablet  Commonly known as: Aminofen  Take 2 tablets by mouth every 6 hours as needed for Pain     amLODIPine 10 MG tablet  Commonly known as: NORVASC  Take 1 tablet by mouth every morning     dicyclomine 10 MG capsule  Commonly known as: Bentyl  Take 1 capsule by mouth 3 times daily As needed for abdominal pain     levETIRAcetam 500 MG tablet  Commonly known as: KEPPRA  Take 1 tablet by mouth 2 times daily     levothyroxine 25 MCG tablet  Commonly known as: SYNTHROID     pantoprazole 40 MG tablet  Commonly known as: PROTONIX  Take 1 tablet by mouth 2 times daily (before meals)     PARoxetine 40 MG tablet  Commonly known as: PAXIL  Take 1 tablet by mouth every morning           Where to Get Your Medications      You can get these medications from any pharmacy    Bring a paper prescription for each of these medications  · albuterol sulfate  (90 Base) MCG/ACT inhaler  · azithromycin 500 MG tablet         Significant Diagnostic Studies:   Blood work reviewed. CBC with Differential:    Lab Results   Component Value Date    WBC 10.4 06/09/2021    RBC 4.68 06/09/2021    HGB 14.7 06/09/2021    HCT 41.6 06/09/2021     06/09/2021    MCV 88.9 06/09/2021    MCH 31.4 06/09/2021    MCHC 35.3 06/09/2021    RDW 11.6 06/09/2021    SEGSPCT 54.1 06/08/2021    LYMPHOPCT 20.7 06/08/2021    MONOPCT 12.8 06/08/2021    BASOPCT 0.8 06/08/2021    MONOSABS 1.4 06/08/2021    LYMPHSABS 2.2 06/08/2021    EOSABS 1.2 06/08/2021    BASOSABS 0.1 06/08/2021    DIFFTYPE AUTOMATED DIFFERENTIAL 06/08/2021     CMP:    Lab Results   Component Value Date     06/09/2021    K 3.8 06/09/2021     06/09/2021    CO2 26 06/09/2021    BUN 10 06/09/2021    CREATININE 0.8 06/09/2021    GFRAA >60 06/09/2021    LABGLOM >60 06/09/2021    GLUCOSE 121 06/09/2021    PROT 6.1 06/09/2021    LABALBU 3.5 06/09/2021    CALCIUM 8.7 06/09/2021    BILITOT 0.3 06/09/2021    ALKPHOS 80 06/09/2021    AST 29 06/09/2021    ALT 61 06/09/2021     Demario Xiao MD   Physician   Specialty:  Pulmonology   Consults       Unsigned Transcription   Date of Service:  6/3/2021 11:10 AM            (suggestion)   Draft: Not electronically signed.           Show:Clear all  [x]Manual[]Template[]Copied    Added by:  [x]Demario Paulino Olivier Navarrete MD    []Rohanver for details                    [address], [address] [facility]                                   CONSULTATION     PATIENT NAME: Gabrielle Brink               :        1965  MED REC NO:   [id number]                          ROOM:       2019  ACCOUNT NO:   [de-identified]                           ADMIT DATE: 2021  PROVIDER:     Julia Huddleston MD     CONSULT DATE:  2021     HISTORY OF PRESENT ILLNESS:  The patient is a 79-year-old gentleman with  multiple medical problems including hypertension, depression, seizure  disorder, cerebral palsy, who lives in a group home, who was admitted  through the emergency room with complaints of increasing shortness of  breath, cough productive of whitish to yellow phlegm. He denied any  hemoptysis. He denied any fever or chills. He denied any nausea or  vomiting. He denied any chest pains. He had a CAT scan of the chest  which showed bilateral pneumonia. He was hypoxic. He was placed on  oxygen, started on antibiotics and was admitted to the hospital.  His  respiratory disease panel PCR was positive for parainfluenza 3. His  COVID-19 was negative. PAST MEDICAL HISTORY:  Significant for hypertension, depression, seizure  disorder, cerebral palsy. PAST SURGICAL HISTORY:  Remarkable for eye surgery, abdominal surgery,  upper endoscopy. FAMILY HISTORY:  Noncontributory. SOCIAL HISTORY:  Reveals that he is a nonsmoker. No history of alcohol  or drug abuse. MEDICATIONS:  His medications were reviewed. ALLERGIES:  He has no known allergies. REVIEW OF SYSTEMS:  A 10- to 14-point review of systems were reviewed  and are negative except for what is mentioned in history of present  illness. PHYSICAL EXAMINATION:  GENERAL:  The patient is awake and responsive, in no acute respiratory  distress.   VITAL SIGNS:  Blood pressure was 131/91 mmHg, pulse of 94 per minute,  respiratory rate of 17 per minute. He is afebrile. His saturation is  93% on 4 liters nasal cannula. HEENT:  Exam is essentially unremarkable. NECK:  There is no JVD, no lymphadenopathy. The neck is supple. LUNGS:  Exam revealed occasional rhonchi and basilar crackles. HEART:  Exam showed normal S1, S2. There was no S3, S4 noted. ABDOMEN:  Exam is benign. There is no evidence of any organomegaly. The bowel sounds are present. NEUROLOGIC:  Exam reveals that he is awake and responsive and moving his  extremities. LABORATORY DATA:  His CAT scan of the chest showed no evidence of PE. Bibasilar lung infiltrates right greater than the left, ground-glass  _____, but nodularity identified in the right upper lobe and left upper  lobe. His CBC showed a white count 9.0, hemoglobin 15.4, hematocrit  44.2. His electrolytes showed a sodium 136, potassium 3.6, chloride 98,  carbon dioxide 25, BUN 11, creatinine 0.8. IMPRESSION:  1. Acute respiratory failure secondary to #2.  2.  Bilateral pneumonia secondary to parainfluenza 3.  3.  Possible superadded bacterial pneumonia. 4.  History of cerebral palsy. PLAN:  1. Continue present antibiotic therapy. 2.  We will start DuoNeb q.4 h. while awake. 3.  Sputum for culture and sensitivity. 4.  Mucinex 600 mg twice a day. 5.  Check mag and phos. 6.  Check procalcitonin level. 7.  As per orders.            Radha Cintron MD     D: 06/03/2021 11:02:44       T: 06/03/2021 11:10:32     /[email or web address]  Job#: [id number]     Doc#: [id number]     CC:           XR CHEST (2 VW) [[id number]] Collected: 06/06/21 7398      Order Status: Completed Specimen: Chest Updated: 06/06/21 1853     Narrative:       EXAMINATION:   TWO XRAY VIEWS OF THE CHEST     6/6/2021 4:45 pm     COMPARISON:   06/01/2021     HISTORY:   ORDERING SYSTEM PROVIDED HISTORY: pneumonia   TECHNOLOGIST PROVIDED HISTORY:   Reason for exam:->pneumonia   Reason for Exam: pneumonia Acuity: Acute   Type of Exam: Initial     FINDINGS:   Cardiomediastinal silhouette is unchanged in size. There is persistent   elevation of the right hemidiaphragm. There is linear left basilar opacity. No large pleural effusion or pneumothorax. There are multilevel degenerative   changes of thoracic spine. Impression:       Linear left basilar opacity, atelectasis versus pneumonia. CT HEAD WO CONTRAST [[id number]] Collected: 06/05/21 2107     Order Status: Completed Updated: 06/05/21 2115     Narrative:       EXAMINATION:   CT OF THE HEAD WITHOUT CONTRAST  6/5/2021 3:48 pm     TECHNIQUE:   CT of the head was performed without the administration of intravenous   contrast. Dose modulation, iterative reconstruction, and/or weight based   adjustment of the mA/kV was utilized to reduce the radiation dose to as low   as reasonably achievable. COMPARISON:   08/26/2020     HISTORY:   ORDERING SYSTEM PROVIDED HISTORY: fall, hit head   TECHNOLOGIST PROVIDED HISTORY:   Reason for exam:->fall, hit head   Has a \"code stroke\" or \"stroke alert\" been called? ->No   Reason for Exam: fall, hit head     FINDINGS:   BRAIN/VENTRICLES: Colpocephaly is again detected, especially on the left,   unchanged when compared to the previous exam.  No acute loss of the   gray-white matter differentiation is identified to suggest acute or subacute   infarct. No masses or hemorrhages are seen within the brain parenchyma. No midline shift. Intracranial vasculature is unremarkable. ORBITS: No acute orbital abnormalities are identified. SINUSES: There is new moderate bilateral maxillary sinus mucosal thickening,   with bilateral maxillary sinus air-fluid levels. There is new near complete   opacification of the ethmoid sinus. Mild mucosal thickening within the   frontal sinus and sphenoid sinus is noted. SOFT TISSUES/SKULL: The calvarium is intact. Extracranial soft tissues are   unremarkable.       Impression: febrile, cough   Decision Support Exception - unselect if not a suspected or confirmed   emergency medical condition->Emergency Medical Condition (MA)   Reason for Exam: sob/abd pain     FINDINGS:   Lower Chest: Basilar opacities are identified, right greater than left. Organs: Low-attenuation of the liver is compatible with steatosis. No focal   masses. The gallbladder, spleen, pancreas, adrenal glands, and kidneys   demonstrate no acute abnormality. Bilateral ureters are normal in course and   caliber. No ureteral calculi. GI/Bowel: The stomach, small bowel, and colon are normal in course and   caliber without evidence of wall thickening or obstruction. Small hiatal   hernia is present. Postsurgical changes to the right colon are identified. No complication. Pelvis: Normal bladder. Prostate and seminal vesicles are unremarkable. Peritoneum/Retroperitoneum: No free fluid or free air. No pathologic   lymphadenopathy. Aorta and its branches are patent and normal in course and   caliber. Moderate atherosclerosis. Bones/Soft Tissues: No acute or aggressive osseous lesion. Stable superior   endplate deformity of P31. Impression:       1. No acute intra-abdominal abnormality. 2. Hepatic steatosis. 3. Basilar opacities, right greater than left. Please refer to dedicated CT   of the chest for further evaluation. 4. Hiatal hernia. CTA PULMONARY W CONTRAST [[id number]] Collected: 06/02/21 0344     Order Status: Completed Updated: 06/03/21 0642     Narrative:       EXAMINATION:   CTA OF THE CHEST 6/2/2021 3:30 am     TECHNIQUE:   CTA of the chest was performed after the administration of intravenous   contrast.  Multiplanar reformatted images are provided for review. MIP   images are provided for review. Dose modulation, iterative reconstruction,   and/or weight based adjustment of the mA/kV was utilized to reduce the   radiation dose to as low as reasonably achievable. COMPARISON:   August 3, 2020 radiograph     HISTORY:   ORDERING SYSTEM PROVIDED HISTORY: hypoxia, cough, shortness of breath, rule   out PE   TECHNOLOGIST PROVIDED HISTORY:   Reason for exam:->hypoxia, cough, shortness of breath, rule out PE   Decision Support Exception - unselect if not a suspected or confirmed   emergency medical condition->Emergency Medical Condition (MA)   Reason for Exam: trauma     FINDINGS:   Pulmonary Arteries: Pulmonary arteries are adequately opacified for   evaluation. No evidence of intraluminal filling defect to suggest pulmonary   embolism to the lobar level. Further evaluation is limited secondary to   bolus timing and motion. Main pulmonary artery is normal in caliber. Mediastinum: Heart is normal in size without a pericardial effusion. The   aorta and arch branches are normal in course and caliber. No pathologic lymphadenopathy. Lungs/pleura: Evaluation of the lungs is limited by motion. Focal opacities   are identified in the lung bases, right greater than left. Ground-glass and   tree-in-bud nodularity is also identified in the right upper lobe and left   upper lobe. The central airways are patent. Further evaluation is limited due to   expiratory phase during imaging. Upper Abdomen: Hepatic steatosis. Hiatal hernia is also present. Soft Tissues/Bones: No acute bone or soft tissue abnormality. Impression:       1. No pulmonary embolus to the lobar level. Further evaluation is limited by   bolus quality and patient motion. 2. Multifocal pneumonia. 3. Hepatic steatosis. 4. Hiatal hernia. Disposition:   home  All the above has been explained to patient and or family. Patient would need F/U with his/her PCP in 7 days. Explained that it is the patients responsibility to have blood work or radiology testing done as an out patient.  He/She has to take the discharge papers from the hospital for out patient follow up visit with the PCP/Physician. Time spent  >30 minutes.   Signed:  Sarah Beth Cleaning MD MD, [address]  6/9/2021, 9:40 AM

## 2021-06-09 NOTE — CARE COORDINATION
Notified Sammy Wilson at [facility] [phone] that patient is on discharge today and just waiting on home O2 eval . She voiced understanding and stated to arrange transport for patient and just notify her of time.

## 2021-06-09 NOTE — PROGRESS NOTES
6/9/2021 2:20 PM  Patient Room #: 2019/2019-A  Patient Name: Claudia Ro    (Step 1 Done by RN if possible otherwise call Pulmonary Diagnostics)  1. Place patient on room air at rest for at least 30 minutes. If patient falls below 88% before 30 minutes then you can record the level and stop. Record room air saturation level _92-93_ %. If patient is at 88% or below, they will qualify for home oxygen and you can stop. If level does not fall below 88%, fill in level above. If indicated continue to Step 2. Signature:_Krysta Solis RN____ Date: _6//9/2021__  (Step 2&3 Done by RCP)  Per RN pt does not walk  2. Ambulate patient on room air until saturation falls below 89%. Record level of room air saturation with ambulation___ %. Next, place patient back on ___lpm oxygen and ambulate, record level __%. (Note:  this level must show improvement from room air level done with ambulation.)  If patients saturation on room air with ambulation is 88% or below AND patient shows improvement with oxygen during ambulation, they will qualify for home oxygen and you can stop. If patient does not drop below 89%, then patient should have an overnight oximetry trending on room air to see if level falls below 88%. Complete level in Step 3 below. 3. Room air overnight oximetry level 88 % for___  cumulative minutes. If patients room air oxygen level is < 89% for at least 5 cumulative minutes, patient will qualify for home oxygen and you can stop. (Attach Night Trending Report)    Complete order below: Diagnosis:____  Home oxygen at:  Length of Need: ? Lifetime ?  3 Months     ___lpm or __%   via  [] nasal cannula  []mask  [] other:___         []continuous []  with activity  []  Nocturnal   [] Portable Tanks []  Concentrator        Therapist Signature:_______     Date:  ___  Physician Signature:  __Electronically Signed in EMR_    Date:___  Physician Printed Name:   Oc Ball MD  NPI:  [id number]__  [] Patient Qualifies      [x] Patient Does NOT qualify

## 2021-06-09 NOTE — PROGRESS NOTES
Discharge instructions reviewed with pt. Verified all personal belongings accounted for. Transport to be here @1900 to take pt home.

## 2021-06-09 NOTE — PROGRESS NOTES
Received report from Nida Osorio, this nurse to assume care of pt at this time. Informed that a home oxygen eval needs completed. Pt placed on room air while sitting in bed. Pt's SpO2 level stayed 92%-93% for entire duration of evaluation; approx 30min.

## 2021-06-09 NOTE — CARE COORDINATION
Spoke with patient's nurse Arcadio Sender to see if patient is going to discharge on oxygen. He is going to check and return call to CM.

## 2021-06-09 NOTE — DISCHARGE INSTR - COC
Continuity of Care Form    Patient Name: Theodore Tabor   :  1965  MRN:  [id number]    Admit date:  2021  Discharge date:  ***    Code Status Order: Full Code   Advance Directives:   Advance Care Flowsheet Documentation     Date/Time Healthcare Directive Type of Healthcare Directive Copy in [address] Agent's Name Healthcare Agent's Phone Number    21 1300  No, patient does not have an advance directive for healthcare treatment -- -- -- -- --          Admitting Physician:  Maribel Griffith MD  PCP: Kane Ventura MD    Discharging Nurse: [facility] Unit/Room#: -A  Discharging Unit Phone Number: ***    Emergency Contact:   Extended Emergency Contact Information  Primary Emergency Contact: Felisha Kelley [address] Phone: [phone]  Relation: Aunt/Uncle  Secondary Emergency Contact: Nadia Tobias [address] Phone: [phone]  Relation: Other    Past Surgical History:  Past Surgical History:   Procedure Laterality Date    ABDOMEN SURGERY      EYE SURGERY      UPPER GASTROINTESTINAL ENDOSCOPY N/A 12/3/2019    EGD BIOPSY performed by Alyce Woodard MD at [facility] ENDOSCOPY       Immunization History:   Immunization History   Administered Date(s) Administered    Pneumococcal Polysaccharide (Yvbmabilx56) 2016    Td, unspecified formulation 2014    Tdap (Boostrix, Adacel) 2012       Active Problems:  Patient Active Problem List   Diagnosis Code    Pneumonia J18.9    Cerebral palsy, hemiplegic ([facility] [address] G80.8    Hypoxia R09.02    Seizure disorder ([facility] [address] G40.909    Gait disturbance R26.9    Pneumonia of both lungs due to infectious organism J18.9    Acute upper GI bleed K92.2    Cecal polyp K63.5    Sebaceous cyst L72.3       Isolation/Infection:   Isolation          No Isolation        Patient Infection Status     Infection Onset Added Last Indicated Last Indicated By Review Planned Expiration Resolved Resolved By    None active    Resolved    COVID-19 Rule Out 06/02/21 06/02/21 06/02/21 Respiratory Panel, Molecular, with COVID-19 (Restricted: peds pts or suitable admitted adults) (Ordered)   06/02/21 Rule-Out Test Resulted    COVID-19 Rule Out 06/01/21 06/01/21 06/01/21 COVID-19, Rapid (Ordered)   06/02/21 Rule-Out Test Resulted    C-diff Rule Out  11/30/19 11/30/19 C difficile Molecular/PCR (Ordered)   06/02/21 Jose Green LPN          Nurse Assessment:  Last Vital Signs: BP (!) 140/97   Pulse 89   Temp 98.4 °F (36.9 °C) (Oral)   Resp 28   Ht 5' 7\" (1.702 m)   Wt 170 lb 10.2 oz (77.4 kg)   SpO2 94%   BMI 26.73 kg/m²     Last documented pain score (0-10 scale): Pain Level: 0  Last Weight:   Wt Readings from Last 1 Encounters:   06/06/21 170 lb 10.2 oz (77.4 kg)     Mental Status:  {IP PT MENTAL STATUS:20030}    IV Access:  { SHANITA IV ACCESS:712628423}    Nursing Mobility/ADLs:  Walking   {CHP DME CRSA:425190240}  Transfer  {CHP DME VHHP:342479768}  Bathing  {CHP DME DMUY:014255990}  Dressing  {CHP DME VAIL:353342316}  Toileting  {CHP DME IOPY:221207163}  Feeding  {P DME ZEMT:132074734}  Med Admin  {P DME YZNY:884715944}  Med Delivery   { SHANITA MED Delivery:913231983}    Wound Care Documentation and Therapy:        Elimination:  Continence:   · Bowel: {YES / CE:64573}  · Bladder: {YES / GA:24394}  Urinary Catheter: {Urinary Catheter:419963649}   Colostomy/Ileostomy/Ileal Conduit: {YES / FB:38838}       Date of Last BM: ***  No intake or output data in the 24 hours ending 06/09/21 1649  No intake/output data recorded.     Safety Concerns:     [facility] Safety Concerns:032456004}    Impairments/Disabilities:      [address] [facility] Impairments/Disabilities:433846657}    Nutrition Therapy:  Current Nutrition Therapy:   [address] [facility] Diet List:275515839}    Routes of Feeding: {CHP DME Other Feedings:246877946}  Liquids: {Slp liquid thickness:79847}  Daily Fluid Restriction: {CHP DME Yes amt AOJFXTS:019685759}  Last Modified Barium Swallow with Video (Video Swallowing Test): {Done Not Done SRCA:628100507}    Treatments at the Time of Hospital Discharge:   Respiratory Treatments: ***  Oxygen Therapy:  {Therapy; copd oxygen:85558}  Ventilator:    { CC Vent JQLA:408295568}    Rehab Therapies: {THERAPEUTIC INTERVENTION:8099539603}  Weight Bearing Status/Restrictions: {[facility] Weight Bearin}  Other Medical Equipment (for information only, NOT a DME order):  {EQUIPMENT:911073408}  Other Treatments: ***    Patient's personal belongings (please select all that are sent with patient):  {[facility] DME Belongings:635979056}    RN SIGNATURE:  {Esignature:099825380}    CASE MANAGEMENT/SOCIAL WORK SECTION    Inpatient Status Date: ***    Readmission Risk Assessment Score:  Readmission Risk              Risk of Unplanned Readmission:  11           Discharging to Facility/ Agency   · Name:   · Address:  · Phone:  · Fax:    Dialysis Facility (if applicable)   · Name:  · Address:  · Dialysis Schedule:  · Phone:  · Fax:    / signature: {Esignature:437456699}    PHYSICIAN SECTION    Prognosis: {Prognosis:9921324366}    Condition at Discharge: [address] [place] [address] Patient Condition:590294518}    Rehab Potential (if transferring to Rehab): {Prognosis:5257015488}    Recommended Labs or Other Treatments After Discharge: ***    Physician Certification: I certify the above information and transfer of Yared King  is necessary for the continuing treatment of the diagnosis listed and that he requires {Admit to Appropriate Level of Care:70484} for {GREATER/LESS:595115389} 30 days.      Update Admission H&P: {[facility] DME Changes in PUBTS:127107758}    PHYSICIAN SIGNATURE:  {Esignature:104133154}

## 2021-06-10 ENCOUNTER — CARE COORDINATION (OUTPATIENT)
Dept: CASE MANAGEMENT | Age: 56
End: 2021-06-10

## 2021-06-10 NOTE — CARE COORDINATION
[facility] 45 Transitions Initial Follow Up Call    Call within 2 business days of discharge: Yes    Patient: Molly Merchant Patient : 1965   MRN: [id number]  Reason for Admission: Pna, Ac Resp Failure, Mech Fall 21, Newly Dx'd Hepatic Stenosis  Discharge Date: 21 RARS: Readmission Risk Score: [address] [facility] [address]: [address]       Complaint Diagnosis Description Type Department Provider    21 Cough; Nasal Congestion; Pharyngitis Multifocal pneumonia . .. ED to Hosp-Admission (Discharged) (ADMITTED) [address] Jose A Arenas MD; LUIS Juarez. Non-face-to-face services provided:  Obtained and reviewed discharge summary and/or continuity of care documents    Care Transitions 24 Hour Call    Schedule Follow Up Appointment with PCP: Declined  Do you have any ongoing symptoms?: Yes  Patient-reported symptoms: Cough  Interventions for patient-reported symptoms: Other (Comment: No identified or reported need)  Do you have a copy of your discharge instructions?: Yes  Do you have all of your prescriptions and are they filled?: Yes  Have you been contacted by a [address]?: No  Have you scheduled your follow up appointment?: No (Comment: [facility] staff to schedule today and provide transport)  Were you discharged with any Home Care or Post Acute Services: Yes  Post Acute Services: Other (Comment: Group Home w/ support of [facility]  supervision)  Do you feel like you have everything you need to keep you well at home?: Yes  Care Transitions Interventions  No Identified Needs   Home Care Waiver: Declined        DME Assistance: Declined        Follow Up  No future appointments. Challenges to be reviewed by the provider   Additional needs identified to be addressed with provider : No       Method of communication with provider :n/a    Was this a readmission?  No  Patient stated reason for admission: SOB  Patients top risk factors for readmission: functional physical ability, lack of knowledge about disease, medical condition-Pna, Newly Dx'd Hepatic Stenosis, Cerebral Palsy and medication management    Spoke with Didi Muñiz for [facility] discharge call, verified Patient name and  as identifiers. Introduced self and explained BPCI program, agreeable to ongoing follow up per [employer].    Phone Assessment: Reports Patient doing well since home, at baseline functioning. Reports occasional dry cough noted. Denies sob, resp distress, fever, chills, nasal congestion, malaise or other Patient complaints. Reviewed Pna Zone Mgt, verbalizes understanding. AVS/Meds: Confirmed copy of AVS received, reviewed with Christina Mayorga. Confirmed Patient obtained and is taking Zithromax, Albuterol as directed. Med education provided, questions answered, verbalizes understanding. Stressed importance of med compliance and completing atb unless otherwise directed by MD. Annie Aranda obtaining 90 day supply of routine, prn meds. Advised contacting pharmacy/md for refill needs. Resource Need Assessment:   Living Arrangements: Resides in [address] [place] [address] setting w/ roomates and  staff supervision per [address] [place] [address] [phone]  ADLS: Assisted as needed by staff  DME: None, denies need  HHC: Denies need    Hospital Follow Up Appointments: Discussed below appts, stressed importance of 7 day hospital follow up for continuity of care. Caregiver to schedule appt w/ Dr Meagan Tenorio today. Transportation per staff. [id number]:  Patient has received Covid19 vaccine series. Reviewed IECWO92 preventative measures including mask covering nose/mouth, social distancing, hand washing. Advance Care Planning   Healthcare Decision Maker:    Primary Decision Maker: Gisela Agustiner - Aunt/Uncle - [phone]    Advised to contact PCP  re: any health concerns for early outpatient intervention in an effort to avoid hospitalization.  Discussed benefits of WIC, Urgent Care. Advised 911 if noting acute distress.     Glendy Marin RN

## 2021-06-17 ENCOUNTER — CARE COORDINATION (OUTPATIENT)
Dept: CASE MANAGEMENT | Age: 56
End: 2021-06-17

## 2021-06-17 NOTE — CARE COORDINATION
Ana 45 Transitions Follow Up Call    2021    Patient: Derek Lozano  Patient : 1965   MRN: <[id number]>  Reason for Admission:   Discharge Date: 21 RARS: Readmission Risk Score: 11       Attempted to reach [facility] by phone regarding follow up; BPCI-A. HIPAA compliant message left on voicemail requesting return call; CTN contact information provided.      Khris Yip RN

## 2021-06-24 ENCOUNTER — CARE COORDINATION (OUTPATIENT)
Dept: CASE MANAGEMENT | Age: 56
End: 2021-06-24

## 2021-07-23 ENCOUNTER — HOSPITAL ENCOUNTER (EMERGENCY)
Age: 56
Discharge: HOME | End: 2021-07-23
Attending: STUDENT IN AN ORGANIZED HEALTH CARE EDUCATION/TRAINING PROGRAM
Payer: MEDICARE

## 2021-07-23 VITALS
TEMPERATURE: 98.4 F | SYSTOLIC BLOOD PRESSURE: 171 MMHG | RESPIRATION RATE: 16 BRPM | OXYGEN SATURATION: 94 % | WEIGHT: 175 LBS | DIASTOLIC BLOOD PRESSURE: 102 MMHG | HEIGHT: 68 IN | BODY MASS INDEX: 26.52 KG/M2 | HEART RATE: 109 BPM

## 2021-07-23 DIAGNOSIS — S03.2XXA TOOTH AVULSION, INITIAL ENCOUNTER: Primary | ICD-10-CM

## 2021-07-23 DIAGNOSIS — S01.511A LIP LACERATION, INITIAL ENCOUNTER: ICD-10-CM

## 2021-07-23 PROCEDURE — 99282 EMERGENCY DEPT VISIT SF MDM: CPT

## 2021-07-23 PROCEDURE — 12011 RPR F/E/E/N/L/M 2.5 CM/<: CPT

## 2021-07-23 PROCEDURE — 6360000002 HC RX W HCPCS: Performed by: STUDENT IN AN ORGANIZED HEALTH CARE EDUCATION/TRAINING PROGRAM

## 2021-07-23 PROCEDURE — 96372 THER/PROPH/DIAG INJ SC/IM: CPT

## 2021-07-23 PROCEDURE — 2500000003 HC RX 250 WO HCPCS: Performed by: STUDENT IN AN ORGANIZED HEALTH CARE EDUCATION/TRAINING PROGRAM

## 2021-07-23 PROCEDURE — 90471 IMMUNIZATION ADMIN: CPT | Performed by: STUDENT IN AN ORGANIZED HEALTH CARE EDUCATION/TRAINING PROGRAM

## 2021-07-23 PROCEDURE — 90714 TD VACC NO PRESV 7 YRS+ IM: CPT | Performed by: STUDENT IN AN ORGANIZED HEALTH CARE EDUCATION/TRAINING PROGRAM

## 2021-07-23 RX ORDER — TETANUS AND DIPHTHERIA TOXOIDS ADSORBED 2; 2 [LF]/.5ML; [LF]/.5ML
0.5 INJECTION INTRAMUSCULAR ONCE
Status: COMPLETED | OUTPATIENT
Start: 2021-07-23 | End: 2021-07-23

## 2021-07-23 RX ORDER — LIDOCAINE HYDROCHLORIDE 10 MG/ML
5 INJECTION, SOLUTION EPIDURAL; INFILTRATION; INTRACAUDAL; PERINEURAL ONCE
Status: COMPLETED | OUTPATIENT
Start: 2021-07-23 | End: 2021-07-23

## 2021-07-23 RX ADMIN — TETANUS AND DIPHTHERIA TOXOIDS ADSORBED 0.5 ML: 2; 2 INJECTION INTRAMUSCULAR at 19:32

## 2021-07-23 RX ADMIN — LIDOCAINE HYDROCHLORIDE 5 ML: 10 INJECTION, SOLUTION EPIDURAL; INFILTRATION; INTRACAUDAL; PERINEURAL at 19:10

## 2021-07-23 ASSESSMENT — PAIN DESCRIPTION - LOCATION: LOCATION: MOUTH

## 2021-07-24 NOTE — ED PROVIDER NOTES
by Jeremy Song MD at [facility] ENDOSCOPY     Surgical history reviewed and no pertinent surgical history other than the ones mentioned above    CURRENT MEDICATIONS    Current Outpatient Rx   Medication Sig Dispense Refill    albuterol sulfate  (90 Base) MCG/ACT inhaler Inhale 2 puffs into the lungs every 2 hours as needed for Wheezing or Shortness of Breath 1 Inhaler 0    dicyclomine (BENTYL) 10 MG capsule Take 1 capsule by mouth 3 times daily As needed for abdominal pain 15 capsule 0    pantoprazole (PROTONIX) 40 MG tablet Take 1 tablet by mouth 2 times daily (before meals) 60 tablet 0    levothyroxine (SYNTHROID) 25 MCG tablet Take 25 mcg by mouth Daily      acetaminophen (AMINOFEN) 325 MG tablet Take 2 tablets by mouth every 6 hours as needed for Pain 30 tablet 0    levETIRAcetam (KEPPRA) 500 MG tablet Take 1 tablet by mouth 2 times daily 60 tablet 0    PARoxetine (PAXIL) 40 MG tablet Take 1 tablet by mouth every morning 30 tablet 0    amLODIPine (NORVASC) 10 MG tablet Take 1 tablet by mouth every morning 30 tablet 0     Medication is reviewed    ALLERGIES    No Known Allergies  Allergy is reviewed    FAMILY HISTORY    History reviewed. No pertinent family history.   Family history reviewed and no pertinent family history other than the ones mentioned above    SOCIAL HISTORY    Social History     Socioeconomic History    Marital status: Single     Spouse name: Not on file    Number of children: Not on file    Years of education: Not on file    Highest education level: Not on file   Occupational History    Not on file   Tobacco Use    Smoking status: Never Smoker    Smokeless tobacco: Never Used   Substance and Sexual Activity    Alcohol use: No    Drug use: No    Sexual activity: Not on file   Other Topics Concern    Not on file   Social History Narrative    Not on file     Social Determinants of Health     Financial Resource Strain:     Difficulty of Paying Living Expenses:    Food Insecurity:     Worried About Running Out of Food in the Last Year:     [address] in the Last Year:    Transportation Needs:     Lack of Transportation (Medical):  Lack of Transportation (Non-Medical):    Physical Activity:     Days of Exercise per Week:     Minutes of Exercise per Session:    Stress:     Feeling of Stress :    Social Connections:     Frequency of Communication with Friends and Family:     Frequency of Social Gatherings with Friends and Family:     Attends Voodoo Services:     Active Member of Clubs or Organizations:     Attends Club or Organization Meetings:     Marital Status:    Intimate Partner Violence:     Fear of Current or Ex-Partner:     Emotionally Abused:     Physically Abused:     Sexually Abused:      Live with daughter  Alcohol and recreational drug use: denies  Social history reviewed and no pertinent social history other than the ones mentioned above    PHYSICAL EXAM    Vital Signs:BP (!) 171/102   Pulse 109   Temp 98.4 °F (36.9 °C) (Oral)   Resp 16   Ht 5' 8\" (1.727 m)   Wt 175 lb (79.4 kg)   SpO2 94%   BMI 26.61 kg/m²   I have reviewed the triage vital signs. Constitutional: Well nourished, well developed, appears stated age  Eyes: PERRL, no conjunctival injection  HENT: Bilateral from twos avulsed, only 1 twos was brought in by the family that seems to be intact however the cavity is relatively shallow nonbleeding, 1 cm lip laceration above the vermilion borders on the left side, neurovascularly intact  CV: RRR, Warm, no edema  RESP: Normal RR, no increased respiratory efforts  GI: soft, non-distended  MSK: No gross deformities  Skin: Warm, dry. No rashes  Neuro: Alert, CNs II-XII grossly intact. Moving all 4 extremities  Psych: Appropriate mood and affect.     Labs:   Labs Reviewed - No data to display    Radiology:  No orders to display       I directly reviewed the images and radiology interpretation    ED COURSE  Assessment & Medical Decision Making:  Jovan Dacosta is a 63 y.o. male who presents with laceration and tooth avulsion, regarding a tooth avulsion I did attempt to place intact tooth back into the socket however the socket is too shallow at this point to be able to place the Tuesday patient is advised for urgent dental follow-up within 24 hours for that, patient tetanus status updated here in the emergency department, did repair patient's lip laceration, see procedure note documented separately. Patient mentating normal with no loss of consciousness, no headache and no C-spine pain does not require any further imaging on my evaluation. DDx includes but not limited to: Laceration, dental ridge fracture, dental avulsion    Workup includes but not limited to: Detailed exam    Treatment includes but not limited to: Laceration repair    Critical care time: NA    Impression:   1. Dental avulsion  2. Lip laceration    Disposition: Discharge    This note dictated using Dragon medical voice recognition software. Attempts at proofreading were made, but errors may occasionally still occur. Procedure Note - Laceration repair:  Questions were sought and answered and verbal consent was given by patient for the procedure. The area was prepped and draped in standard bedside fashion. The wound area was anesthetized with 2ml of Lidocaine 1% without epinephrine without added sodium bicarbonate. The wound was explored with No foreign bodies found. The wound was repaired with 5-0 Prolene; 1  sutures were used. The patient tolerated the procedure well without complications and my repeat neurovascular exam post-procedure is unchanged. Wound care and scar minimization education was provided. Instructions were given to return for increasing pain, redness, streaking, discharge, or any other worsening or worrisome concerns.            Vern Hall MD  07/23/21 1063

## 2021-09-03 ENCOUNTER — CARE COORDINATION (OUTPATIENT)
Dept: CASE MANAGEMENT | Age: 56
End: 2021-09-03

## 2021-09-03 NOTE — CARE COORDINATION
[facility] 45 Transitions Follow Up Call    9/3/2021    Patient: Larry Wall  Patient : 1965   MRN: <[id number]>  Reason for Admission:   Discharge Date: 21 RARS: Readmission Risk Score: 11    Attempted to contact [facility] of [address] [place] [address] Didi for final BPCI-A follow up. Left message with contact information and request for call back. BPCI-A bundle ending. CTN signing off. Follow Up  No future appointments.     Florin Osorio, RN

## 2021-10-08 ENCOUNTER — APPOINTMENT (OUTPATIENT)
Dept: CT IMAGING | Age: 56
End: 2021-10-08
Payer: MEDICARE

## 2021-10-08 ENCOUNTER — HOSPITAL ENCOUNTER (EMERGENCY)
Age: 56
Discharge: HOME | End: 2021-10-08
Payer: MEDICARE

## 2021-10-08 VITALS
SYSTOLIC BLOOD PRESSURE: 143 MMHG | RESPIRATION RATE: 16 BRPM | TEMPERATURE: 97.6 F | WEIGHT: 168 LBS | BODY MASS INDEX: 25.46 KG/M2 | OXYGEN SATURATION: 93 % | HEIGHT: 68 IN | DIASTOLIC BLOOD PRESSURE: 105 MMHG | HEART RATE: 95 BPM

## 2021-10-08 DIAGNOSIS — S09.90XA CLOSED HEAD INJURY, INITIAL ENCOUNTER: ICD-10-CM

## 2021-10-08 DIAGNOSIS — S01.511A LIP LACERATION, INITIAL ENCOUNTER: ICD-10-CM
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
## 2021-10-08 PROCEDURE — 6370000000 HC RX 637 (ALT 250 FOR IP): Performed by: PHYSICIAN ASSISTANT

## 2021-10-08 PROCEDURE — 99284 EMERGENCY DEPT VISIT MOD MDM: CPT

## 2021-10-08 PROCEDURE — 70486 CT MAXILLOFACIAL W/O DYE: CPT

## 2021-10-08 PROCEDURE — 70450 CT HEAD/BRAIN W/O DYE: CPT

## 2021-10-08 RX ORDER — ACETAMINOPHEN 325 MG/1
650 TABLET ORAL ONCE
Status: COMPLETED | OUTPATIENT
Start: 2021-10-08 | End: 2021-10-08

## 2021-10-08 RX ADMIN — ACETAMINOPHEN 650 MG: 325 TABLET ORAL at 21:06

## 2021-10-08 ASSESSMENT — PAIN SCALES - GENERAL: PAINLEVEL_OUTOF10: 5

## 2021-10-08 NOTE — ED NOTES
ED to find ride home if discharged; if not call [phone] and they will try to find him a ride home.      Anderson Lam  10/08/21 8855

## 2021-10-08 NOTE — ED TRIAGE NOTES
Pt states he tripped and fell at home with laceration to lip. Denies any head injury or LOC. Pt denies any other injuries. Pt alert and oriented X4.

## 2021-10-09 NOTE — ED PROVIDER NOTES
[address] [facility] COMPLAINT    Chief Complaint   Patient presents with    Fall    Laceration     dried blood noted on lips         This patient was not evaluated by the attending physician. I have independently evaluated this patient. HPI    Yulia Wise is a 61 y.o. male who presents to the emergency department today after sustaining mechanical fall, he states he tripped and fell falling forward, hitting his face on the ground. Denies loss of conscious. Is a small laceration to the left upper lip. No dentition injury. Denies loss of conscious. Was able to get to his feet. Not anticoagulated. Neurologically intact. REVIEW OF SYSTEMS    Constitutional:  Denies constitutional symptoms, unusual behavior, confusion. Neurologic:   Denies confusion or memory loss. Denies light-headedness, dizziness, or  LOC. No extremity pain, sensory changes, or weakness. Eyes:  Denies diplopia, blurred vision, or loss visual field. Denies discharge . Cardiac: No Chest Pain, palpitations, or Chest Injury  Respiratory:  Denies cough, shortness of breath, respiratory discomfort   GI: No Abdominal pain or Abdominal Injury  Skin:  See HPI.     Lymphatic:  Denies swollen glands   All other review of systems are negative  See HPI and nursing notes for additional information     PAST MEDICAL & SURGICAL HISTORY    Past Medical History:   Diagnosis Date    Cerebral palsy ([address]     Depression     Hypertension     Seizures ([address]      Past Surgical History:   Procedure Laterality Date    ABDOMEN SURGERY      EYE SURGERY      UPPER GASTROINTESTINAL ENDOSCOPY N/A 12/3/2019    EGD BIOPSY performed by Doe Kraus MD at [address] [place] [address]    Current Outpatient Rx   Medication Sig Dispense Refill    albuterol sulfate  (90 Base) MCG/ACT inhaler Inhale 2 puffs into the lungs every 2 hours as needed for Wheezing or Shortness of Breath 1 Inhaler 0    dicyclomine (BENTYL) 10 MG capsule Take 1 capsule by mouth 3 times daily As needed for abdominal pain 15 capsule 0    pantoprazole (PROTONIX) 40 MG tablet Take 1 tablet by mouth 2 times daily (before meals) 60 tablet 0    levothyroxine (SYNTHROID) 25 MCG tablet Take 25 mcg by mouth Daily      acetaminophen (AMINOFEN) 325 MG tablet Take 2 tablets by mouth every 6 hours as needed for Pain 30 tablet 0    levETIRAcetam (KEPPRA) 500 MG tablet Take 1 tablet by mouth 2 times daily 60 tablet 0    PARoxetine (PAXIL) 40 MG tablet Take 1 tablet by mouth every morning 30 tablet 0    amLODIPine (NORVASC) 10 MG tablet Take 1 tablet by mouth every morning 30 tablet 0       ALLERGIES    No Known Allergies    SOCIAL & FAMILY HISTORY    Social History     Socioeconomic History    Marital status: Single     Spouse name: None    Number of children: None    Years of education: None    Highest education level: None   Occupational History    None   Tobacco Use    Smoking status: Never Smoker    Smokeless tobacco: Never Used   Substance and Sexual Activity    Alcohol use: No    Drug use: No    Sexual activity: None   Other Topics Concern    None   Social History Narrative    None     Social Determinants of Health     Financial Resource Strain:     Difficulty of Paying Living Expenses:    Food Insecurity:     Worried About Running Out of Food in the Last Year:     Ran Out of Food in the Last Year:    Transportation Needs:     Lack of Transportation (Medical):      Lack of Transportation (Non-Medical):    Physical Activity:     Days of Exercise per Week:     Minutes of Exercise per Session:    Stress:     Feeling of Stress :    Social Connections:     Frequency of Communication with Friends and Family:     Frequency of Social Gatherings with Friends and Family:     Attends Buddhism Services:     Active Member of Clubs or Organizations:     Attends Club or Organization Meetings:     Marital Status:    Intimate Partner Violence:     Fear of Current or Ex-Partner:     Emotionally Abused:     Physically Abused:     Sexually Abused:      History reviewed. No pertinent family history. PHYSICAL EXAM    VITAL SIGNS: BP (!) 143/105   Pulse 95   Temp 97.6 °F (36.4 °C)   Resp 16   Ht 5' 8\" (1.727 m)   Wt 168 lb (76.2 kg)   SpO2 93%   BMI 25.54 kg/m²    Constitutional:  Well developed, well nourished, no acute distress   Scalp:  No swelling, discoloration. Skin intact  Neck:   No JVD    No swelling or discoloration on inspection. No posterior midline neck tenderness. No pain or deficit on range of motion. Eyes:   PERRL. EOMI (no evidence of muscle entrapment)     HENT:   No trismus, airway patent. EACs and TMs clear. Nares patent bilaterally without clear fluid or blood. No mass or evidence of septal hematoma. Oropharynx clear, dentition intact   No Snyder sign,  no raccoon sign  Palpation of facial bones reveals no tenderness, palpable defect. No TMJ tenderness or palpable defect, clicks/pops. Respiratory:  Lungs Clear, no retractions   Cardiovascular:  Reg rate, no murmurs  GI:  Soft, nontender, normal bowel sounds  Musculoskeletal:  No edema, no acute deformities  Integument: There is a superficial less than 1 cm laceration to the left upper lip that is not actively bleeding. Neurologic:    - Alert & oriented person, place, time, and situation, no speech difficulties or slurring.  - No obvious gross motor deficits  - Cranial nerves 2-12 grossly intact  - Sensation intact to light touch  - Strength 5/5 in upper and lower extremities bilaterally  - Normal finger to nose test bilaterally  - Light touch sensation intact throughout. - Upper and lower extremity DTRs 2+ bilaterally.   - Gait steady and without difficulty  Psych: Pleasant affect, no hallucinations      IMAGING:  CT HEAD WO CONTRAST    Result Date: 10/8/2021  EXAMINATION: CT OF THE HEAD WITHOUT CONTRAST; CT OF THE FACE WITHOUT CONTRAST  10/8/2021 4:25 pm; 10/8/2021 4:26 pm TECHNIQUE: CT of the head was performed without the administration of intravenous contrast. Dose modulation, iterative reconstruction, and/or weight based adjustment of the mA/kV was utilized to reduce the radiation dose to as low as reasonably achievable.; CT of the face was performed without the administration of intravenous contrast. Multiplanar reformatted images are provided for review. Dose modulation, iterative reconstruction, and/or weight based adjustment of the mA/kV was utilized to reduce the radiation dose to as low as reasonably achievable. COMPARISON: Head CT, 06/05/2021 HISTORY: ORDERING SYSTEM PROVIDED HISTORY: fallclosed head injury TECHNOLOGIST PROVIDED HISTORY: Reason for exam:->fallclosed head injury Has a \"code stroke\" or \"stroke alert\" been called? ->No Decision Support Exception - unselect if not a suspected or confirmed emergency medical condition->Emergency Medical Condition (MA) Reason for Exam: fall, head/facial pain. Acuity: Acute Type of Exam: Initial Mechanism of Injury: fall, head/facial pain. Relevant Medical/Surgical History: none; ORDERING SYSTEM PROVIDED HISTORY: fall, facial injury TECHNOLOGIST PROVIDED HISTORY: Reason for exam:->fall, facial injury Reason for Exam: fall, head/facial pain. Acuity: Acute Type of Exam: Initial Mechanism of Injury: fall, head/facial pain. Relevant Medical/Surgical History: none FINDINGS: CT HEAD: BRAIN/VENTRICLES: Severe colpocephaly is again noted, especially on the left, unchanged. No acute loss of the gray-white matter differentiation is identified to suggest acute or subacute infarct. No masses or hemorrhages within the brain parenchyma are found. No midline shift. Intracranial vasculature unremarkable. SOFT TISSUES/SKULL: The calvarium is intact. Extracranial soft tissues are unremarkable. CT FACIAL BONES: FACIAL BONES: The mandible and temporomandibular joints are intact.   The hard palate, nasal spine, pterygoid plates, and zygomatic arches are intact. No nasal bone fractures are identified. ORBITS: The globes appear intact. The extraocular muscles, optic nerve sheath complexes and lacrimal glands appear unremarkable. No retrobulbar hematoma or mass is seen. The orbital walls and rims are intact. SINUSES/MASTOIDS: Mild mucosal thickening noted within the maxillary sinuses bilaterally. Paranasal sinuses and mastoid air cells are otherwise clear. SOFT TISSUES: No acute abnormality of the visualized skull or soft tissues. Head CT: No acute intracranial abnormality detected. Unchanged colpocephaly. Facial CT: No acute abnormality detected. CT FACIAL BONES WO CONTRAST    Result Date: 10/8/2021  EXAMINATION: CT OF THE HEAD WITHOUT CONTRAST; CT OF THE FACE WITHOUT CONTRAST  10/8/2021 4:25 pm; 10/8/2021 4:26 pm TECHNIQUE: CT of the head was performed without the administration of intravenous contrast. Dose modulation, iterative reconstruction, and/or weight based adjustment of the mA/kV was utilized to reduce the radiation dose to as low as reasonably achievable.; CT of the face was performed without the administration of intravenous contrast. Multiplanar reformatted images are provided for review. Dose modulation, iterative reconstruction, and/or weight based adjustment of the mA/kV was utilized to reduce the radiation dose to as low as reasonably achievable. COMPARISON: Head CT, 06/05/2021 HISTORY: ORDERING SYSTEM PROVIDED HISTORY: fallclosed head injury TECHNOLOGIST PROVIDED HISTORY: Reason for exam:->fallclosed head injury Has a \"code stroke\" or \"stroke alert\" been called? ->No Decision Support Exception - unselect if not a suspected or confirmed emergency medical condition->Emergency Medical Condition (MA) Reason for Exam: fall, head/facial pain. Acuity: Acute Type of Exam: Initial Mechanism of Injury: fall, head/facial pain.  Relevant Medical/Surgical History: none; ORDERING SYSTEM PROVIDED HISTORY: fall, facial injury TECHNOLOGIST PROVIDED HISTORY: Reason for exam:->fall, facial injury Reason for Exam: fall, head/facial pain. Acuity: Acute Type of Exam: Initial Mechanism of Injury: fall, head/facial pain. Relevant Medical/Surgical History: none FINDINGS: CT HEAD: BRAIN/VENTRICLES: Severe colpocephaly is again noted, especially on the left, unchanged. No acute loss of the gray-white matter differentiation is identified to suggest acute or subacute infarct. No masses or hemorrhages within the brain parenchyma are found. No midline shift. Intracranial vasculature unremarkable. SOFT TISSUES/SKULL: The calvarium is intact. Extracranial soft tissues are unremarkable. CT FACIAL BONES: FACIAL BONES: The mandible and temporomandibular joints are intact. The hard palate, nasal spine, pterygoid plates, and zygomatic arches are intact. No nasal bone fractures are identified. ORBITS: The globes appear intact. The extraocular muscles, optic nerve sheath complexes and lacrimal glands appear unremarkable. No retrobulbar hematoma or mass is seen. The orbital walls and rims are intact. SINUSES/MASTOIDS: Mild mucosal thickening noted within the maxillary sinuses bilaterally. Paranasal sinuses and mastoid air cells are otherwise clear. SOFT TISSUES: No acute abnormality of the visualized skull or soft tissues. Head CT: No acute intracranial abnormality detected. Unchanged colpocephaly. Facial CT: No acute abnormality detected. ED COURSE & MEDICAL DECISION MAKING     Patient presents with a mechanical fall. Has negative imaging. Laceration is superficial did not need repair, we will clean place Steri-Strips. Will advise follow-up with primary care in the next several days. Close monitoring of the next 12 to 24 hours. Discharged in stable condition.     I discussed Return to emergency Department precautions with patient which include worsening pain or swelling, vision changes, focal neurological symptoms (discussed), or any new symptoms. The patient and/or the family were informed of the results of any tests/labs/imaging, the treatment plan, and time was allotted to answer questions. Clinical  IMPRESSION    1. Fall, initial encounter    2. Closed head injury, initial encounter    3. Lip laceration, initial encounter      Comment: Please note this report has been produced using speech recognition software and may contain errors related to that system including errors in grammar, punctuation, and spelling, as well as words and phrases that may be inappropriate. If there are any questions or concerns please feel free to contact the dictating provider for clarification.               Faye Garcia 411, PA  10/08/21 2022

## 2022-03-26 ENCOUNTER — APPOINTMENT (OUTPATIENT)
Dept: CT IMAGING | Age: 57
End: 2022-03-26
Payer: MEDICARE

## 2022-03-26 ENCOUNTER — HOSPITAL ENCOUNTER (EMERGENCY)
Age: 57
Discharge: HOME | End: 2022-03-27
Payer: MEDICARE

## 2022-03-26 VITALS
WEIGHT: 168 LBS | DIASTOLIC BLOOD PRESSURE: 98 MMHG | SYSTOLIC BLOOD PRESSURE: 146 MMHG | RESPIRATION RATE: 18 BRPM | BODY MASS INDEX: 25.46 KG/M2 | HEIGHT: 68 IN | TEMPERATURE: 98.2 F | OXYGEN SATURATION: 93 % | HEART RATE: 72 BPM

## 2022-03-26 DIAGNOSIS — M54.50 LOW BACK PAIN, UNSPECIFIED BACK PAIN LATERALITY, UNSPECIFIED CHRONICITY, UNSPECIFIED WHETHER SCIATICA PRESENT: Primary | ICD-10-CM
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
## 2022-03-26 PROCEDURE — 72125 CT NECK SPINE W/O DYE: CPT

## 2022-03-26 PROCEDURE — 99284 EMERGENCY DEPT VISIT MOD MDM: CPT

## 2022-03-26 PROCEDURE — 72131 CT LUMBAR SPINE W/O DYE: CPT

## 2022-03-26 PROCEDURE — 72128 CT CHEST SPINE W/O DYE: CPT

## 2022-03-26 PROCEDURE — 6370000000 HC RX 637 (ALT 250 FOR IP): Performed by: PHYSICIAN ASSISTANT

## 2022-03-26 RX ORDER — IBUPROFEN 600 MG/1
600 TABLET ORAL ONCE
Status: COMPLETED | OUTPATIENT
Start: 2022-03-26 | End: 2022-03-26

## 2022-03-26 RX ORDER — HYDROCODONE BITARTRATE AND ACETAMINOPHEN 5; 325 MG/1; MG/1
1 TABLET ORAL ONCE
Status: COMPLETED | OUTPATIENT
Start: 2022-03-26 | End: 2022-03-26

## 2022-03-26 RX ORDER — LIDOCAINE 50 MG/G
1 PATCH TOPICAL DAILY
Qty: 7 PATCH | Refills: 0 | Status: SHIPPED | OUTPATIENT
Start: 2022-03-26 | End: 2022-04-02

## 2022-03-26 RX ADMIN — IBUPROFEN 600 MG: 600 TABLET ORAL at 16:16

## 2022-03-26 RX ADMIN — HYDROCODONE BITARTRATE AND ACETAMINOPHEN 1 TABLET: 5; 325 TABLET ORAL at 16:16

## 2022-03-26 ASSESSMENT — PAIN SCALES - GENERAL
PAINLEVEL_OUTOF10: 6
PAINLEVEL_OUTOF10: 6

## 2022-03-26 ASSESSMENT — PAIN DESCRIPTION - LOCATION: LOCATION: BACK

## 2022-03-26 ASSESSMENT — ENCOUNTER SYMPTOMS
BACK PAIN: 1
RHINORRHEA: 0
DIARRHEA: 0
NAUSEA: 0
SHORTNESS OF BREATH: 0
COUGH: 0
ABDOMINAL PAIN: 0
EYE PAIN: 0
VOMITING: 0

## 2022-03-26 ASSESSMENT — PAIN - FUNCTIONAL ASSESSMENT: PAIN_FUNCTIONAL_ASSESSMENT: 0-10

## 2022-03-26 ASSESSMENT — PAIN DESCRIPTION - ORIENTATION: ORIENTATION: LOWER

## 2022-03-26 NOTE — CARE COORDINATION
CM consult per Fransisco Lomas to assist with discharge planning for pt who was getting in [place] after getting off work and fell landing on his back, was unable to get up after fall, EMS called. PPt has insurance and PCP. CM visited pt who is alert and oriented pt is MRDD lives in a group home. Pt works at [employer]. Pt states he is doing better has been able to stand up since he has been in ER pt states pain is better since medication. CM ask pt when he works again, pt states works on Monday. CM ask pt he he feels he may need a work note, pt declines. Pt gives CM the # to his group home # [phone] to contact for a ride home. [facility] called [facility], they will pick pt up from ER. CM explained Discharge instructions to pt who verbalized understanding. CM assisted pt to vehicle via W/C, gave discharge instructions to /caregiver from group home.  Pt able to stand and get into car with stand-by assist. EVE,RN/CM

## 2022-03-26 NOTE — ED PROVIDER NOTES
**ADVANCED PRACTICE PROVIDER, I HAVE EVALUATED THIS PATIENT**        [address] [facility]  EMERGENCY DEPARTMENT ENCOUNTER      Pt Name: Toni Leong  VCE:[id number]  Pradipgfsavage 1965  Date of evaluation: 3/26/2022  Provider: Juan Moore PA-C      Chief Complaint:    Chief Complaint   Patient presents with    Fall         Nursing Notes, Past Medical Hx, Past Surgical Hx, Social Hx, Allergies, and Family Hx were all reviewed and agreed with or any disagreements were addressed in the HPI.    HPI: (Location, Duration, Timing, Severity, Quality, Assoc Sx, Context, Modifying factors)    Chief Complaint of back pain, fall    This is a  61 y.o. male who presents complaint of back pain, after falling backward attempting get into SUV. He mentions this occurred just prior to arrival.  He just finished his work, and was attempting to get into an SUV, describes slipping stepping into and falling directly back. He mentions he was unable to get up, and bystander had called for an EMS who assisted him to get up. He is complaining of pain to his lower back, but does state that he feels like he may have pain in his neck as well.   Otherwise he denies any history of chronic back pain, head injury, loss of consciousness, abdominal pain nausea vomiting, difficulty breathing, extremity pain or swelling, numbness tingling weakness, recent illness, syncope, headache, anticoagulants    PastMedical/Surgical History:      Diagnosis Date    Cerebral palsy ([address]     Depression     Hypertension     [address] [address] [facility]          Procedure Laterality Date    ABDOMEN SURGERY      EYE SURGERY      UPPER GASTROINTESTINAL ENDOSCOPY N/A 12/3/2019    EGD BIOPSY performed by Neri Parks MD at [facility] ENDOSCOPY       Medications:  Discharge Medication List as of 3/26/2022  6:28 PM      CONTINUE these medications which have NOT CHANGED    Details   albuterol sulfate  (90 Base) MCG/ACT inhaler Inhale 2 puffs into the lungs every 2 hours as needed for Wheezing or Shortness of Breath, Disp-1 Inhaler, R-0Print      dicyclomine (BENTYL) 10 MG capsule Take 1 capsule by mouth 3 times daily As needed for abdominal pain, Disp-15 capsule,R-0Print      pantoprazole (PROTONIX) 40 MG tablet Take 1 tablet by mouth 2 times daily (before meals), Disp-60 tablet, R-0Normal      levothyroxine (SYNTHROID) 25 MCG tablet Take 25 mcg by mouth DailyHistorical Med      acetaminophen (AMINOFEN) 325 MG tablet Take 2 tablets by mouth every 6 hours as needed for Pain, Disp-30 tablet, R-0Print      levETIRAcetam (KEPPRA) 500 MG tablet Take 1 tablet by mouth 2 times daily, Disp-60 tablet, R-0      PARoxetine (PAXIL) 40 MG tablet Take 1 tablet by mouth every morning, Disp-30 tablet, R-0      amLODIPine (NORVASC) 10 MG tablet Take 1 tablet by mouth every morning, Disp-30 tablet, R-0               Review of Systems:  (2-9 systems needed)  Review of Systems   Constitutional: Negative for chills and fever. HENT: Negative for congestion and rhinorrhea. Eyes: Negative for pain and visual disturbance. Respiratory: Negative for cough and shortness of breath. Cardiovascular: Negative for chest pain and leg swelling. Gastrointestinal: Negative for abdominal pain, diarrhea, nausea and vomiting. Genitourinary: Negative for dysuria and hematuria. Musculoskeletal: Positive for back pain and gait problem (chronic). Negative for myalgias. Skin: Negative for rash and wound. Neurological: Negative for dizziness and light-headedness. \"Positives and Pertinent negatives as per HPI\"    Physical Exam:  Physical Exam  Vitals and nursing note reviewed. Constitutional:       Appearance: Normal appearance. He is well-developed. He is not ill-appearing or diaphoretic. HENT:      Head: Normocephalic and atraumatic.       Right Ear: External ear normal.      Left Ear: External ear normal.      Nose: Nose normal.   Eyes: General:         Right eye: No discharge. Left eye: No discharge. Pulmonary:      Effort: Pulmonary effort is normal. No respiratory distress. Breath sounds: No stridor. Musculoskeletal:      Cervical back: Normal range of motion and neck supple. Lumbar back: Tenderness present. Negative right straight leg raise test and negative left straight leg raise test.   Skin:     General: Skin is warm and dry. Coloration: Skin is not pale. Neurological:      General: No focal deficit present. Mental Status: He is alert and oriented to person, place, and time. Psychiatric:         Mood and Affect: Mood normal.         Behavior: Behavior normal.         MEDICAL DECISION MAKING    Vitals:    Vitals:    03/26/22 1459 03/26/22 1558 03/26/22 1617 03/26/22 1833   BP: (!) 138/97 (!) 129/90 (!) 129/92 (!) 146/98   Pulse: 87  85 72   Resp: 18  18 18   Temp:  98.2 °F (36.8 °C) 98.2 °F (36.8 °C)    TempSrc:  Oral     SpO2: 93%  92% 93%   Weight: 168 lb (76.2 kg)      Height: 5' 8\" (1.727 m)          LABS:Labs Reviewed - No data to display     Remainder of labs reviewed and were negative at this time or not returned at the time of this note. RADIOLOGY:   Non-plain film images such as CT, Ultrasound and MRI are read by the radiologist. Radha Bullard PA-C have directly visualized the radiologic plain film image(s) with the below findings:      Interpretation per the Radiologist below, if available at the time of this note:    CT LUMBAR SPINE WO CONTRAST   Final Result   No acute lumbar spine abnormality      Multilevel degenerative changes         CT THORACIC SPINE WO CONTRAST   Final Result   No acute thoracic spine abnormality      Old superior endplate compression deformity T12      Multilevel degenerative changes         CT CERVICAL SPINE WO CONTRAST   Final Result   No acute abnormality of the cervical spine. No results found.        MEDICAL DECISION MAKING / ED COURSE: PROCEDURES:   Procedures    None    Patient was given:  Medications   HYDROcodone-acetaminophen (NORCO) 5-325 MG per tablet 1 tablet (1 tablet Oral Given 3/26/22 1616)   ibuprofen (ADVIL;MOTRIN) tablet 600 mg (600 mg Oral Given 3/26/22 1616)       39-year-old female, past medical history of cerebral palsy, presents with above HPI. He arrives by EMS. I saw patient in hallway bed. Alert and oriented no acute distress head and face. Atraumatic. Sensation intact. GCS 15. Diffuse pain over lumbar. Exam limited due to patient's pain, and hallway bed. Analgesics and CT imaging ordered. CT shows chornic T12 compression fracture otherwise no acute findings. Patient pain more lumbar. On reeval patient was able to situp get to his feet and walk. He states he is feeling better. His gait is at his baseline. Remains alert. No acute focal neuro deficits. Likely contusion sprain strain. Safe for outpatient f/u, symptomatic measures at home. Pt is agreeable. He feels pain has improved and feels comfortable to go home. The patient tolerated their visit well. I evaluated the patient. The physician was available for consultation as needed. The patient and / or the family were informed of the results of any tests, a time was given to answer questions, a plan was proposed and they agreed with plan. CLINICAL IMPRESSION:  1. Low back pain, unspecified back pain laterality, unspecified chronicity, unspecified whether sciatica present    2.  Fall, initial encounter        DISPOSITION        PATIENT REFERRED TO:  Noble Rodríguez MD  [place] [address]  [phone]            DISCHARGE MEDICATIONS:  Discharge Medication List as of 3/26/2022  6:28 PM      START taking these medications    Details   lidocaine (LIDODERM) 5 % Place 1 patch onto the skin daily for 7 days 12 hours on, 12 hours off., Disp-7 patch, R-0Normal             DISCONTINUED MEDICATIONS:  Discharge Medication List as of 3/26/2022  6:28 PM                 (Please note the MDM and HPI sections of this note were completed with a voice recognition program.  Efforts were made to edit the dictations but occasionally words are mis-transcribed.)    Electronically signed, Tuan Toribio PA-C,           Tuan Toribio PA-C  03/27/22 3187

## 2022-04-17 ENCOUNTER — HOSPITAL ENCOUNTER (INPATIENT)
Age: 57
LOS: 3 days | Discharge: NURSING, REHAB OR LONG-TERM CARE | DRG: 193 | End: 2022-04-21
Attending: INTERNAL MEDICINE | Admitting: INTERNAL MEDICINE
Payer: MEDICARE

## 2022-04-17 ENCOUNTER — APPOINTMENT (OUTPATIENT)
Dept: GENERAL RADIOLOGY | Age: 57
DRG: 193 | End: 2022-04-17
Payer: MEDICARE

## 2022-04-17 DIAGNOSIS — R74.8 ELEVATED LIPASE: ICD-10-CM

## 2022-04-17 DIAGNOSIS — R09.02 HYPOXIA: Primary | ICD-10-CM

## 2022-04-17 DIAGNOSIS — R05.9 COUGH: ICD-10-CM

## 2022-04-17 DIAGNOSIS — R10.9 ABDOMINAL PAIN, UNSPECIFIED ABDOMINAL LOCATION: ICD-10-CM

## 2022-04-17 DIAGNOSIS — M25.562 ACUTE PAIN OF LEFT KNEE: ICD-10-CM

## 2022-04-17 LAB
ALBUMIN SERPL-MCNC: 3.8 GM/DL (ref 3.4–5)
ALP BLD-CCNC: 78 IU/L (ref 40–129)
ALT SERPL-CCNC: 40 U/L (ref 10–40)
ANION GAP SERPL CALCULATED.3IONS-SCNC: 11 MMOL/L (ref 4–16)
AST SERPL-CCNC: 35 IU/L (ref 15–37)
BASOPHILS ABSOLUTE: 0 K/CU MM
BASOPHILS RELATIVE PERCENT: 0.7 % (ref 0–1)
BILIRUB SERPL-MCNC: 0.5 MG/DL (ref 0–1)
BUN BLDV-MCNC: 12 MG/DL (ref 6–23)
CALCIUM SERPL-MCNC: 8 MG/DL (ref 8.3–10.6)
CHLORIDE BLD-SCNC: 102 MMOL/L (ref 99–110)
CO2: 25 MMOL/L (ref 21–32)
CREAT SERPL-MCNC: 0.9 MG/DL (ref 0.9–1.3)
DIFFERENTIAL TYPE: ABNORMAL
EOSINOPHILS ABSOLUTE: 0.4 K/CU MM
EOSINOPHILS RELATIVE PERCENT: 6.3 % (ref 0–3)
GFR AFRICAN AMERICAN: >60 ML/MIN/1.73M2
GFR NON-AFRICAN AMERICAN: >60 ML/MIN/1.73M2
GLUCOSE BLD-MCNC: 95 MG/DL (ref 70–99)
HCT VFR BLD CALC: 44.1 % (ref 42–52)
HEMOGLOBIN: 14.9 GM/DL (ref 13.5–18)
IMMATURE NEUTROPHIL %: 0.3 % (ref 0–0.43)
LIPASE: 234 IU/L (ref 13–60)
LYMPHOCYTES ABSOLUTE: 2.1 K/CU MM
LYMPHOCYTES RELATIVE PERCENT: 34.4 % (ref 24–44)
MCH RBC QN AUTO: 31.3 PG (ref 27–31)
MCHC RBC AUTO-ENTMCNC: 33.8 % (ref 32–36)
MCV RBC AUTO: 92.6 FL (ref 78–100)
MONOCYTES ABSOLUTE: 1.1 K/CU MM
MONOCYTES RELATIVE PERCENT: 17.9 % (ref 0–4)
NUCLEATED RBC %: 0 %
PDW BLD-RTO: 11.9 % (ref 11.7–14.9)
PLATELET # BLD: 177 K/CU MM (ref 140–440)
PMV BLD AUTO: 9.5 FL (ref 7.5–11.1)
POTASSIUM SERPL-SCNC: 3.4 MMOL/L (ref 3.5–5.1)
RBC # BLD: 4.76 M/CU MM (ref 4.6–6.2)
SEGMENTED NEUTROPHILS ABSOLUTE COUNT: 2.4 K/CU MM
SEGMENTED NEUTROPHILS RELATIVE PERCENT: 40.4 % (ref 36–66)
SODIUM BLD-SCNC: 138 MMOL/L (ref 135–145)
TOTAL IMMATURE NEUTOROPHIL: 0.02 K/CU MM
TOTAL NUCLEATED RBC: 0 K/CU MM
TOTAL PROTEIN: 6.9 GM/DL (ref 6.4–8.2)
TROPONIN T: <0.01 NG/ML
WBC # BLD: 6 K/CU MM (ref 4–10.5)

## 2022-04-17 PROCEDURE — 71045 X-RAY EXAM CHEST 1 VIEW: CPT

## 2022-04-17 PROCEDURE — 83690 ASSAY OF LIPASE: CPT

## 2022-04-17 PROCEDURE — 93005 ELECTROCARDIOGRAM TRACING: CPT | Performed by: PHYSICIAN ASSISTANT

## 2022-04-17 PROCEDURE — 85025 COMPLETE CBC W/AUTO DIFF WBC: CPT

## 2022-04-17 PROCEDURE — 99285 EMERGENCY DEPT VISIT HI MDM: CPT

## 2022-04-17 PROCEDURE — 73564 X-RAY EXAM KNEE 4 OR MORE: CPT

## 2022-04-17 PROCEDURE — 83880 ASSAY OF NATRIURETIC PEPTIDE: CPT

## 2022-04-17 PROCEDURE — 80053 COMPREHEN METABOLIC PANEL: CPT

## 2022-04-17 PROCEDURE — 87635 SARS-COV-2 COVID-19 AMP PRB: CPT

## 2022-04-17 PROCEDURE — 84484 ASSAY OF TROPONIN QUANT: CPT

## 2022-04-17 PROCEDURE — 85379 FIBRIN DEGRADATION QUANT: CPT

## 2022-04-18 ENCOUNTER — APPOINTMENT (OUTPATIENT)
Dept: CT IMAGING | Age: 57
DRG: 193 | End: 2022-04-18
Payer: MEDICARE

## 2022-04-18 LAB
ADENOVIRUS DETECTION BY PCR: NOT DETECTED
ADENOVIRUS F 40 41 PCR: NOT DETECTED
ASTROVIRUS PCR: NOT DETECTED
BORDETELLA PARAPERTUSSIS BY PCR: NOT DETECTED
BORDETELLA PERTUSSIS PCR: NOT DETECTED
CAMPYLOBACTER PCR: NOT DETECTED
CHLAMYDOPHILA PNEUMONIA PCR: NOT DETECTED
CORONAVIRUS 229E PCR: NOT DETECTED
CORONAVIRUS HKU1 PCR: NOT DETECTED
CORONAVIRUS NL63 PCR: NOT DETECTED
CORONAVIRUS OC43 PCR: NOT DETECTED
CRYPTOSPORIDIUM PCR: NOT DETECTED
CYCLOSPORA CAYETANENSIS PCR: NOT DETECTED
D DIMER: 1088 NG/ML(DDU)
E COLI 0157 PCR: NOT DETECTED
E COLI ENTEROAGGREGATIVE PCR: NOT DETECTED
E COLI ENTEROPATHOGENIC PCR: NOT DETECTED
E COLI ENTEROTOXIGENIC PCR: NOT DETECTED
E COLI SHIGA LIKE TOXIN PCR: NOT DETECTED
E COLI SHIGELLA/ENTEROINVASIVE PCR: NOT DETECTED
ENTAMOEBA HISTOLYTICA PCR: NOT DETECTED
GIARDIA LAMBLIA PCR: NOT DETECTED
GLUCOSE BLD-MCNC: 95 MG/DL (ref 70–99)
HUMAN METAPNEUMOVIRUS PCR: NOT DETECTED
INFLUENZA A BY PCR: ABNORMAL
INFLUENZA A H1 (2009) PCR: NOT DETECTED
INFLUENZA A H1 PANDEMIC PCR: NOT DETECTED
INFLUENZA A H3 PCR: ABNORMAL
INFLUENZA B BY PCR: NOT DETECTED
MYCOPLASMA PNEUMONIAE PCR: NOT DETECTED
NOROVIRUS GI GII PCR: NOT DETECTED
PARAINFLUENZA 1 PCR: NOT DETECTED
PARAINFLUENZA 2 PCR: NOT DETECTED
PARAINFLUENZA 3 PCR: NOT DETECTED
PARAINFLUENZA 4 PCR: NOT DETECTED
PLESIOMONAS SHIGELLOIDES PCR: NOT DETECTED
PRO-BNP: 26.35 PG/ML
RHINOVIRUS ENTEROVIRUS PCR: NOT DETECTED
ROTAVIRUS A PCR: NOT DETECTED
RSV PCR: NOT DETECTED
SALMONELLA PCR: NOT DETECTED
SAPOVIRUS PCR: NOT DETECTED
SARS-COV-2, NAAT: NOT DETECTED
SARS-COV-2: NOT DETECTED
SOURCE: NORMAL
VIBRIO CHOLERAE PCR: NOT DETECTED
VIBRIO PCR: NOT DETECTED
YERSINIA ENTEROCOLITICA PCR: NOT DETECTED

## 2022-04-18 PROCEDURE — 6370000000 HC RX 637 (ALT 250 FOR IP): Performed by: INTERNAL MEDICINE

## 2022-04-18 PROCEDURE — 6360000002 HC RX W HCPCS: Performed by: INTERNAL MEDICINE

## 2022-04-18 PROCEDURE — 94640 AIRWAY INHALATION TREATMENT: CPT

## 2022-04-18 PROCEDURE — 87324 CLOSTRIDIUM AG IA: CPT

## 2022-04-18 PROCEDURE — 1200000000 HC SEMI PRIVATE

## 2022-04-18 PROCEDURE — 87449 NOS EACH ORGANISM AG IA: CPT

## 2022-04-18 PROCEDURE — 71275 CT ANGIOGRAPHY CHEST: CPT

## 2022-04-18 PROCEDURE — 6360000004 HC RX CONTRAST MEDICATION: Performed by: PHYSICIAN ASSISTANT

## 2022-04-18 PROCEDURE — 2580000003 HC RX 258: Performed by: INTERNAL MEDICINE

## 2022-04-18 PROCEDURE — 82962 GLUCOSE BLOOD TEST: CPT

## 2022-04-18 PROCEDURE — 87507 IADNA-DNA/RNA PROBE TQ 12-25: CPT

## 2022-04-18 PROCEDURE — 74177 CT ABD & PELVIS W/CONTRAST: CPT

## 2022-04-18 PROCEDURE — 0202U NFCT DS 22 TRGT SARS-COV-2: CPT

## 2022-04-18 RX ORDER — IPRATROPIUM BROMIDE AND ALBUTEROL SULFATE 2.5; .5 MG/3ML; MG/3ML
1 SOLUTION RESPIRATORY (INHALATION)
Status: DISCONTINUED | OUTPATIENT
Start: 2022-04-18 | End: 2022-04-21 | Stop reason: HOSPADM

## 2022-04-18 RX ORDER — ACETAMINOPHEN 325 MG/1
650 TABLET ORAL EVERY 6 HOURS PRN
Status: DISCONTINUED | OUTPATIENT
Start: 2022-04-18 | End: 2022-04-21 | Stop reason: HOSPADM

## 2022-04-18 RX ORDER — POTASSIUM CHLORIDE 20 MEQ/1
40 TABLET, EXTENDED RELEASE ORAL PRN
Status: DISCONTINUED | OUTPATIENT
Start: 2022-04-18 | End: 2022-04-21 | Stop reason: HOSPADM

## 2022-04-18 RX ORDER — ONDANSETRON 2 MG/ML
4 INJECTION INTRAMUSCULAR; INTRAVENOUS EVERY 6 HOURS PRN
Status: DISCONTINUED | OUTPATIENT
Start: 2022-04-18 | End: 2022-04-21 | Stop reason: HOSPADM

## 2022-04-18 RX ORDER — LEVOTHYROXINE SODIUM 0.03 MG/1
25 TABLET ORAL DAILY
Status: DISCONTINUED | OUTPATIENT
Start: 2022-04-18 | End: 2022-04-21 | Stop reason: HOSPADM

## 2022-04-18 RX ORDER — OSELTAMIVIR PHOSPHATE 75 MG/1
75 CAPSULE ORAL 2 TIMES DAILY
Status: DISCONTINUED | OUTPATIENT
Start: 2022-04-18 | End: 2022-04-21 | Stop reason: HOSPADM

## 2022-04-18 RX ORDER — POTASSIUM CHLORIDE 7.45 MG/ML
10 INJECTION INTRAVENOUS PRN
Status: DISCONTINUED | OUTPATIENT
Start: 2022-04-18 | End: 2022-04-21 | Stop reason: HOSPADM

## 2022-04-18 RX ORDER — LEVETIRACETAM 500 MG/1
500 TABLET ORAL 2 TIMES DAILY
Status: DISCONTINUED | OUTPATIENT
Start: 2022-04-18 | End: 2022-04-21 | Stop reason: HOSPADM

## 2022-04-18 RX ORDER — SODIUM CHLORIDE 0.9 % (FLUSH) 0.9 %
10 SYRINGE (ML) INJECTION PRN
Status: DISCONTINUED | OUTPATIENT
Start: 2022-04-18 | End: 2022-04-21 | Stop reason: HOSPADM

## 2022-04-18 RX ORDER — ALBUTEROL SULFATE 90 UG/1
2 AEROSOL, METERED RESPIRATORY (INHALATION)
Status: DISCONTINUED | OUTPATIENT
Start: 2022-04-18 | End: 2022-04-21 | Stop reason: HOSPADM

## 2022-04-18 RX ORDER — POTASSIUM CHLORIDE 20 MEQ/1
20 TABLET, EXTENDED RELEASE ORAL ONCE
Status: COMPLETED | OUTPATIENT
Start: 2022-04-18 | End: 2022-04-18

## 2022-04-18 RX ORDER — ACETAMINOPHEN 650 MG/1
650 SUPPOSITORY RECTAL EVERY 6 HOURS PRN
Status: DISCONTINUED | OUTPATIENT
Start: 2022-04-18 | End: 2022-04-21 | Stop reason: HOSPADM

## 2022-04-18 RX ORDER — ONDANSETRON 4 MG/1
4 TABLET, ORALLY DISINTEGRATING ORAL EVERY 8 HOURS PRN
Status: DISCONTINUED | OUTPATIENT
Start: 2022-04-18 | End: 2022-04-21 | Stop reason: HOSPADM

## 2022-04-18 RX ORDER — AMLODIPINE BESYLATE 10 MG/1
10 TABLET ORAL EVERY MORNING
Status: DISCONTINUED | OUTPATIENT
Start: 2022-04-18 | End: 2022-04-21 | Stop reason: HOSPADM

## 2022-04-18 RX ORDER — TOBRAMYCIN 3 MG/ML
1 SOLUTION/ DROPS OPHTHALMIC
Status: DISCONTINUED | OUTPATIENT
Start: 2022-04-18 | End: 2022-04-21 | Stop reason: HOSPADM

## 2022-04-18 RX ORDER — SODIUM CHLORIDE 0.9 % (FLUSH) 0.9 %
10 SYRINGE (ML) INJECTION EVERY 12 HOURS SCHEDULED
Status: DISCONTINUED | OUTPATIENT
Start: 2022-04-18 | End: 2022-04-21 | Stop reason: HOSPADM

## 2022-04-18 RX ORDER — SODIUM CHLORIDE 9 MG/ML
INJECTION, SOLUTION INTRAVENOUS PRN
Status: DISCONTINUED | OUTPATIENT
Start: 2022-04-18 | End: 2022-04-21 | Stop reason: HOSPADM

## 2022-04-18 RX ORDER — GUAIFENESIN 600 MG/1
600 TABLET, EXTENDED RELEASE ORAL 2 TIMES DAILY
Status: DISCONTINUED | OUTPATIENT
Start: 2022-04-18 | End: 2022-04-21 | Stop reason: HOSPADM

## 2022-04-18 RX ORDER — PANTOPRAZOLE SODIUM 40 MG/1
40 TABLET, DELAYED RELEASE ORAL
Status: DISCONTINUED | OUTPATIENT
Start: 2022-04-18 | End: 2022-04-21 | Stop reason: HOSPADM

## 2022-04-18 RX ORDER — PAROXETINE HYDROCHLORIDE 20 MG/1
40 TABLET, FILM COATED ORAL EVERY MORNING
Status: DISCONTINUED | OUTPATIENT
Start: 2022-04-18 | End: 2022-04-21 | Stop reason: HOSPADM

## 2022-04-18 RX ADMIN — GUAIFENESIN 600 MG: 600 TABLET, EXTENDED RELEASE ORAL at 11:39

## 2022-04-18 RX ADMIN — AMLODIPINE BESYLATE 10 MG: 10 TABLET ORAL at 08:04

## 2022-04-18 RX ADMIN — PAROXETINE HYDROCHLORIDE 40 MG: 20 TABLET, FILM COATED ORAL at 08:04

## 2022-04-18 RX ADMIN — IPRATROPIUM BROMIDE AND ALBUTEROL SULFATE 1 AMPULE: .5; 2.5 SOLUTION RESPIRATORY (INHALATION) at 20:45

## 2022-04-18 RX ADMIN — SODIUM CHLORIDE, PRESERVATIVE FREE 10 ML: 5 INJECTION INTRAVENOUS at 08:02

## 2022-04-18 RX ADMIN — OSELTAMIVIR PHOSPHATE 75 MG: 75 CAPSULE ORAL at 21:33

## 2022-04-18 RX ADMIN — LEVETIRACETAM 500 MG: 500 TABLET, FILM COATED ORAL at 09:49

## 2022-04-18 RX ADMIN — LEVOTHYROXINE SODIUM 25 MCG: 25 TABLET ORAL at 08:04

## 2022-04-18 RX ADMIN — POTASSIUM CHLORIDE 20 MEQ: 20 TABLET, EXTENDED RELEASE ORAL at 10:50

## 2022-04-18 RX ADMIN — IOPAMIDOL 80 ML: 755 INJECTION, SOLUTION INTRAVENOUS at 01:33

## 2022-04-18 RX ADMIN — PANTOPRAZOLE SODIUM 40 MG: 40 TABLET, DELAYED RELEASE ORAL at 09:49

## 2022-04-18 RX ADMIN — ENOXAPARIN SODIUM 40 MG: 100 INJECTION SUBCUTANEOUS at 08:01

## 2022-04-18 RX ADMIN — SODIUM CHLORIDE, PRESERVATIVE FREE 10 ML: 5 INJECTION INTRAVENOUS at 21:34

## 2022-04-18 RX ADMIN — AZITHROMYCIN DIHYDRATE 500 MG: 500 INJECTION, POWDER, LYOPHILIZED, FOR SOLUTION INTRAVENOUS at 06:34

## 2022-04-18 RX ADMIN — GUAIFENESIN 600 MG: 600 TABLET, EXTENDED RELEASE ORAL at 21:33

## 2022-04-18 RX ADMIN — LEVETIRACETAM 500 MG: 500 TABLET, FILM COATED ORAL at 21:33

## 2022-04-18 RX ADMIN — OSELTAMIVIR PHOSPHATE 75 MG: 75 CAPSULE ORAL at 14:25

## 2022-04-18 RX ADMIN — TOBRAMYCIN OPHTHALMIC SOLUTION 1 DROP: 3 SOLUTION/ DROPS OPHTHALMIC at 21:38

## 2022-04-18 RX ADMIN — PANTOPRAZOLE SODIUM 40 MG: 40 TABLET, DELAYED RELEASE ORAL at 15:53

## 2022-04-18 RX ADMIN — TOBRAMYCIN OPHTHALMIC SOLUTION 1 DROP: 3 SOLUTION/ DROPS OPHTHALMIC at 16:54

## 2022-04-18 ASSESSMENT — PAIN DESCRIPTION - LOCATION: LOCATION: ABDOMEN

## 2022-04-18 ASSESSMENT — PAIN DESCRIPTION - PAIN TYPE: TYPE: ACUTE PAIN

## 2022-04-18 ASSESSMENT — PAIN SCALES - GENERAL
PAINLEVEL_OUTOF10: 2
PAINLEVEL_OUTOF10: 0
PAINLEVEL_OUTOF10: 0

## 2022-04-18 ASSESSMENT — PAIN DESCRIPTION - ORIENTATION: ORIENTATION: LOWER

## 2022-04-18 NOTE — ED PROVIDER NOTES
ED attending EKG interpretation (I otherwise did not participate in the care of this patient)    EKG Interpretation  Interpreted by me  Compared to 6/1/2021  Rhythm: normal sinus   Rate: normal 96  Axis: normal  Ectopy: none  Conduction: normal  ST Segments: no acute change  T Waves: no acute change  Clinical Impression: normal sinus rhythm, no acute change     Jacki Walsh MD  04/17/22 0790

## 2022-04-18 NOTE — ED PROVIDER NOTES
Josefina VASQUEZ Johnson 89 ENCOUNTER      PCP: Roro Reynoso MD    [address] [facility]    Chief Complaint   Patient presents with    Cough    Leg Pain    Difficulty Walking       This patient was not evaluated by the attending physician. I have independently evaluated this patient. HPI    Bradly Velasquez is a 61 y.o. male who presents with cough for the past 3 days. Cough is been productive of phlegm. Patient has had some associated substernal chest tightness today. Patient denies shortness of breath. Patient states he has had some abdominal pain with coughing. Patient states he has had associated loose stools. Patient denies vomiting or fever. Patient denies sore throat or ear pain. Patient has had associated nasal congestion. Patient also states he has been having difficulty getting around today due to left knee pain. Patient states he did fall a couple weeks ago, denies any new injury. Patient states his roommate is sick.       REVIEW OF SYSTEMS    Constitutional:  Denies fever  HENT:  See HPI Denies sore throat or ear pain   Cardiovascular: see HPI  Respiratory:  + cough Denies shortness of breath    GI:  See HPI  :  Denies any urinary symptoms    Musculoskeletal: see HPI Denies back pain  Skin:  Denies rash  Neurologic:  Denies headache, focal weakness or sensory changes   Lymphatic:  Denies swollen glands     All other review of systems are negative  See HPI and nursing notes for additional information     PAST MEDICAL AND SURGICAL HISTORY    Past Medical History:   Diagnosis Date    Cerebral palsy ([address]     Depression     Hypertension     Seizures ([address]      Past Surgical History:   Procedure Laterality Date    ABDOMEN SURGERY      EYE SURGERY      UPPER GASTROINTESTINAL ENDOSCOPY N/A 12/3/2019    EGD BIOPSY performed by Sangeeta Wayne MD at [address] [place] [address]    Current Outpatient Rx   Medication Sig Dispense Refill    albuterol sulfate  (90 Base) MCG/ACT inhaler Inhale 2 puffs into the lungs every 2 hours as needed for Wheezing or Shortness of Breath 1 Inhaler 0    dicyclomine (BENTYL) 10 MG capsule Take 1 capsule by mouth 3 times daily As needed for abdominal pain 15 capsule 0    pantoprazole (PROTONIX) 40 MG tablet Take 1 tablet by mouth 2 times daily (before meals) 60 tablet 0    levothyroxine (SYNTHROID) 25 MCG tablet Take 25 mcg by mouth Daily      acetaminophen (AMINOFEN) 325 MG tablet Take 2 tablets by mouth every 6 hours as needed for Pain 30 tablet 0    levETIRAcetam (KEPPRA) 500 MG tablet Take 1 tablet by mouth 2 times daily 60 tablet 0    PARoxetine (PAXIL) 40 MG tablet Take 1 tablet by mouth every morning 30 tablet 0    amLODIPine (NORVASC) 10 MG tablet Take 1 tablet by mouth every morning 30 tablet 0       ALLERGIES    Allergies   Allergen Reactions    Pcn [Penicillins]        SOCIAL AND FAMILY HISTORY    Social History     Socioeconomic History    Marital status: Single     Spouse name: None    Number of children: None    Years of education: None    Highest education level: None   Occupational History    None   Tobacco Use    Smoking status: Never Smoker    Smokeless tobacco: Never Used   Substance and Sexual Activity    Alcohol use: No    Drug use: No    Sexual activity: None   Other Topics Concern    None   Social History Narrative    None     Social Determinants of Health     Financial Resource Strain:     Difficulty of Paying Living Expenses: Not on file   Food Insecurity:     Worried About Running Out of Food in the Last Year: Not on file    Abhijit of Food in the Last Year: Not on file   Transportation Needs:     Lack of Transportation (Medical): Not on file    Lack of Transportation (Non-Medical):  Not on file   Physical Activity:     Days of Exercise per Week: Not on file    Minutes of Exercise per Session: Not on file   Stress:     Feeling of Stress : Not on file   Social Connections:     Frequency of Communication with Friends and Family: Not on file    Frequency of Social Gatherings with Friends and Family: Not on file    Attends Mu-ism Services: Not on file    Active Member of Clubs or Organizations: Not on file    Attends Club or Organization Meetings: Not on file    Marital Status: Not on file   Intimate Partner Violence:     Fear of Current or Ex-Partner: Not on file    Emotionally Abused: Not on file    Physically Abused: Not on file    Sexually Abused: Not on file   Housing Stability:     Unable to Pay for Housing in the Last Year: Not on file    Number of [place] in the Last Year: Not on file    Unstable Housing in the Last Year: Not on file     History reviewed. No pertinent family history. PHYSICAL EXAM    VITAL SIGNS: /87   Pulse 82   Temp 98.4 °F (36.9 °C)   Resp 19   Ht 5' 8\" (1.727 m)   Wt 170 lb (77.1 kg)   SpO2 94%   BMI 25.85 kg/m²    Constitutional:  Well developed, Well nourished  HENT:  Normocephalic, Atraumatic, PERRL. EOMI. oropharynx is clear. EACs and TMs clear. Neck/Lymphatics: supple, no JVD, no swollen nodes  Cardiovascular:  Normal heart rate, Normal rhythm, No murmurs  Respiratory:  Nonlabored breathing. Normal breath sounds, No wheezing  Abdomen: Bowel sounds normal, Soft, No tenderness  Musculoskeletal: Left knee with no overlying erythema, ecchymosis, swelling. Mild anterior left knee tenderness. No posterior knee tenderness. No calf or thigh tenderness. Distal sensation and pulses intact. Integument:  Warm, Dry  Neurologic:  Alert & oriented , No focal deficits noted. Sensation intact.   Psychiatric:  Affect normal, Mood normal.       Labs:  Results for orders placed or performed during the hospital encounter of 04/17/22   COVID-19, Rapid    Specimen: Nasopharyngeal   Result Value Ref Range    Source THROAT     SARS-CoV-2, NAAT NOT DETECTED NOT DETECTED   CBC with Auto Differential   Result Value Ref Range    WBC 6.0 4.0 - 10.5 K/CU MM    RBC 4.76 4.6 - 6.2 M/CU MM    Hemoglobin 14.9 13.5 - 18.0 GM/DL    Hematocrit 44.1 42 - 52 %    MCV 92.6 78 - 100 FL    MCH 31.3 (H) 27 - 31 PG    MCHC 33.8 32.0 - 36.0 %    RDW 11.9 11.7 - 14.9 %    Platelets 574 740 - 134 K/CU MM    MPV 9.5 7.5 - 11.1 FL    Differential Type AUTOMATED DIFFERENTIAL     Segs Relative 40.4 36 - 66 %    Lymphocytes % 34.4 24 - 44 %    Monocytes % 17.9 (H) 0 - 4 %    Eosinophils % 6.3 (H) 0 - 3 %    Basophils % 0.7 0 - 1 %    Segs Absolute 2.4 K/CU MM    Lymphocytes Absolute 2.1 K/CU MM    Monocytes Absolute 1.1 K/CU MM    Eosinophils Absolute 0.4 K/CU MM    Basophils Absolute 0.0 K/CU MM    Nucleated RBC % 0.0 %    Total Nucleated RBC 0.0 K/CU MM    Total Immature Neutrophil 0.02 K/CU MM    Immature Neutrophil % 0.3 0 - 0.43 %   Comprehensive Metabolic Panel   Result Value Ref Range    Sodium 138 135 - 145 MMOL/L    Potassium 3.4 (L) 3.5 - 5.1 MMOL/L    Chloride 102 99 - 110 mMol/L    CO2 25 21 - 32 MMOL/L    BUN 12 6 - 23 MG/DL    CREATININE 0.9 0.9 - 1.3 MG/DL    Glucose 95 70 - 99 MG/DL    Calcium 8.0 (L) 8.3 - 10.6 MG/DL    Albumin 3.8 3.4 - 5.0 GM/DL    Total Protein 6.9 6.4 - 8.2 GM/DL    Total Bilirubin 0.5 0.0 - 1.0 MG/DL    ALT 40 10 - 40 U/L    AST 35 15 - 37 IU/L    Alkaline Phosphatase 78 40 - 129 IU/L    GFR Non-African American >60 >60 mL/min/1.73m2    GFR African American >60 >60 mL/min/1.73m2    Anion Gap 11 4 - 16   Troponin   Result Value Ref Range    Troponin T <0.010 <0.01 NG/ML   Lipase   Result Value Ref Range    Lipase 234 (H) 13 - 60 IU/L   Brain Natriuretic Peptide   Result Value Ref Range    Pro-BNP 26.35 <300 PG/ML   D-Dimer, Quantitative   Result Value Ref Range    D-Dimer, Quant 1088 (H) <230 NG/mL(DDU)   EKG 12 Lead   Result Value Ref Range    Ventricular Rate 96 BPM    Atrial Rate 96 BPM    P-R Interval 130 ms    QRS Duration 80 ms    Q-T Interval 370 ms    QTc Calculation (Bazett) 467 ms    P Axis 40 degrees    R Axis 26 degrees    T Axis 31 degrees    Diagnosis to motion artifact, no large or central PE.  CT abdomen pelvis with contrast shows colonic wall thickening suggestive of acute colitis. I discussed labs and imaging with patient today. Patient has no posterior knee pain, calf or thigh pain and I do not believe he has DVT to left lower extremity. Distal sensation pulses intact. No external signs of infection. I do believe patient requires admission for hypoxia and observation. Consult hospitalist who accepts admission. Clinical  IMPRESSION    1. Hypoxia    2. Cough    3. Abdominal pain, unspecified abdominal location    4. Acute pain of left knee    5. Elevated lipase        Patient admitted    Comment: Please note this report has been produced using speech recognition software and may contain errors related to that system including errors in grammar, punctuation, and spelling, as well as words and phrases that may be inappropriate. If there are any questions or concerns please feel free to contact the dictating provider for clarification.             Nuvia Woods PA-C  04/18/22 [id number]

## 2022-04-18 NOTE — PROGRESS NOTES
Physician Progress Note      Raeann Jonas  CSN #:                  [id number]  :                       1965  ADMIT DATE:       2022 10:30 PM  [address] [place] DATE:  RESPONDING  PROVIDER #:        Maeve Aaron          QUERY TEXT:    Patient admitted with possible PNA. Noted documentation of acute respiratory   failure in H&P. In order to support the diagnosis of acute respiratory   failure, please include additional clinical indicators in your documentation. Or please document if the diagnosis of acute respiratory failure has been   ruled out after further study. The medical record reflects the following:  Risk Factors: Bronchitis, possible PNA  Clinical Indicators: ED Provider Note reflects \"Respiratory:  Nonlabored   breathing. Normal breath sounds, No wheezing\" and \"Patient does become   hypoxic on room air and is placed on 2 L nasal cannula oxygen improves into   the 90s. \", R 28 - 18, SPo2 88 RA increased to 97 on 2 L/NC. H&P reflects \"NAD\"   and  \"Respiratory: Symmetrical expansion, mild scattered rhonchi\"  Treatment: CXR, O@ 2l/NC, IV Zithromax. Acute Respiratory Failure Clinical Indicators per [employer] Training Guide and   Quick Reference Guide:  pO2 < 60 mmHg or SpO2 (pulse oximetry) < 91% breathing room air  pCO2 > 50 and pH < 7.35  P/F ratio (pO2 / FIO2) < 300  pO2 decrease or pCO2 increase by 10 mmHg from baseline (if known)  Supplemental oxygen of 40% or more  Presence of respiratory distress, tachypnea, dyspnea, shortness of breath,   wheezing  Unable to speak in complete sentences  Use of accessory muscles to breathe  Extreme anxiety and feeling of impending doom  Tripod position  Confusion/altered mental status/obtunded      Thank you,  Read DIONICIO Aiken, CDs  [phone]  Options provided:  -- Acute Respiratory Failure as evidenced by, Please document evidence.   -- Acute Respiratory Failure ruled out after study  -- Other - I will add my own diagnosis  -- Disagree - Not applicable / Not valid  -- Disagree - Clinically unable to determine / Unknown  -- Refer to Clinical Documentation Reviewer    PROVIDER RESPONSE TEXT:    This patient is in acute respiratory failure as evidenced by Please see H&P   \"Patient was found to have a hypoxic into the 80s on room air,\" \"Does not use   oxygen at home\" Requires oxygen in hospital. Thank you    Query created by: Linn Hughes on 4/18/2022 7:54 AM      Electronically signed by:  Ely Garnica 4/18/2022 9:56 AM

## 2022-04-18 NOTE — CONSULTS
[address] [place] [address], [address] [facility]                                  CONSULTATION    PATIENT NAME: Michael Mendoza               :        1965  MED REC NO:   [id number]                          ROOM:       3002  ACCOUNT NO:   [de-identified]                           ADMIT DATE: 2022  PROVIDER:     Ree Montoya MD    CONSULT DATE:  2022    HISTORY OF PRESENT ILLNESS:  The patient is a 70-year-old gentleman with  multiple medical problems including cerebral palsy, depression,  hypertension, seizure disorder, was is a resident of the [facility] and  was admitted through the emergency room with complaints of increasing  shortness of breath, nonproductive cough, chest tightness, and wheezing. He denied any hemoptysis. He denied any fever or chills. He denied any  nausea or vomiting. The patient was hypoxic and was in the 80s on room  air. He had a CAT scan of the chest which showed no evidence of PE,  minimal left lower lobe atelectasis, and elevation of the right  hemidiaphragm and right lower lobe atelectasis. He was subsequently  admitted to the hospital.  His rapid COVID test was negative. PAST MEDICAL HISTORY:  Significant for cerebral palsy, depression,  hypertension, seizure disorder. PAST SURGICAL HISTORY:  Remarkable for abdominal surgery, eye surgery,  upper endoscopy. FAMILY HISTORY:  Noncontributory. SOCIAL HISTORY:  Reveals that he is a nonsmoker. No history of alcohol  or drug abuse. MEDICATIONS:  Were reviewed. ALLERGIES:  He is allergic to PENICILLIN. REVIEW OF SYSTEMS:  A 10- to 14-point review of systems were reviewed  and are negative except for what is mentioned in the history of present  illness. PHYSICAL EXAMINATION:  GENERAL:  The patient is awake and responsive, in no acute respiratory  distress.   VITAL SIGNS:  His blood pressure is 137/93 mmHg, pulse of 81 per minute,  and respiratory rate of 24 per minute. He is afebrile. His saturation  is 98% on 3 liters nasal cannula. HEENT:  Essentially unremarkable. There is no JVD, no lymphadenopathy. NECK:  Supple. LUNGS:  Revealed diminished breath sounds. Occasional basilar crackles  and occasional rhonchi. HEART:  Showed normal S1, S2. There was no S3, S4 noted. ABDOMEN:  Benign. There is no evidence of any organomegaly. The bowel  sounds are present. NEUROLOGIC:  Reveals that he is awake and responsive and moving his  extremities. LABORATORY AND DIAGNOSTIC DATA:  His CAT scan of the chest as mentioned  in the history of present illness. His rapid COVID test was negative. His electrolytes showed a sodium of 138, potassium 3.4, chloride 102,  carbon dioxide 25, BUN 12, and creatinine 0.9. His CBC showed a white  count of 6.0, hemoglobin 14.9, hematocrit 44.1. IMPRESSION:  1. Acute respiratory failure secondary to possible bibasilar pneumonia. 2.  Possible bibasilar pneumonia. 3.  Reactive airway disease. 4.  Possible underlying obstructive sleep apnea. 5.  Possible atypical pneumonia. PLAN:  1. Continue Zithromax. 2.  Mucinex 600 mg twice a day. 3.  Sputum for culture sensitivity. 4.  DuoNeb q.4h. while awake. 5.  Respiratory disease panel PCR. 6.  Check mag and phos. 7.  Supplement potassium. Check magnesium, phosphorus. 8.  We will check ApneaLink to rule out underlying obstructive sleep  apnea. 9.  As per orders.         Cl Clements MD    D: 04/18/2022 11:24:45       T: 04/18/2022 11:28:19     [email or web address]  Job#: [id number]     Doc#: [id number]    CC:

## 2022-04-18 NOTE — CARE COORDINATION
CM into see pt to initiate a safe discharge plan. Cm called room and no answer. Pt is in resp isolation. CM called Nicole  pts caregiver at [facility] home. CM introduced self and explained role of CM. Pt lives at Group home. Pt has CP. Pt is able to care for self. He uses no DME. Pt is alone at times and able to manage. Pt works 3-4 days per week at [employer]. Pt has a PCP. Pt has insurance and able to obtain medications. Discharge plan is for the pt to return to the Group home. When pt is ready please call [facility] at [phone] and inform. [facility] will provide transportation home. D/S and AVS can be sent home with pt. CM provided card and encouraged to call for any needs or concern. CM is available if any needs arise.

## 2022-04-18 NOTE — PLAN OF CARE
Problem: Pain:  Goal: Pain level will decrease  Description: Pain level will decrease  Outcome: Ongoing  Goal: Control of acute pain  Description: Control of acute pain  Outcome: Ongoing  Goal: Control of chronic pain  Description: Control of chronic pain  Outcome: Ongoing     Problem: Falls - Risk of:  Goal: Will remain free from falls  Description: Will remain free from falls  Outcome: Ongoing  Goal: Absence of physical injury  Description: Absence of physical injury  Outcome: Ongoing     Problem: Diarrhea:  Goal: Bowel elimination is within specified parameters  Description: Bowel elimination is within specified parameters  Outcome: Ongoing  Goal: Passage of soft, formed stool  Description: Passage of soft, formed stool  Outcome: Ongoing  Goal: Establishment of normal bowel function will improve to within specified parameters  Description: Establishment of normal bowel function will improve to within specified parameters  Outcome: Ongoing     Problem: OXYGENATION/RESPIRATORY FUNCTION  Goal: Patient will maintain patent airway  Outcome: Ongoing  Goal: Patient will achieve/maintain normal respiratory rate/effort  Description: Respiratory rate and effort will be within normal limits for the patient  Outcome: Ongoing     Problem: MECHANICAL VENTILATION  Goal: Patient will maintain patent airway  Outcome: Ongoing  Goal: Oral health is maintained or improved  Outcome: Ongoing  Goal: Tracheostomy will be managed safely  Outcome: Ongoing  Goal: ET tube will be managed safely  Outcome: Ongoing  Goal: Ability to express needs and understand communication  Outcome: Ongoing  Goal: Mobility/activity is maintained at optimum level for patient  Outcome: Ongoing     Problem: SKIN INTEGRITY  Goal: Skin integrity is maintained or improved  Outcome: Ongoing     Problem: NUTRITION  Goal: Nutritional status is improving  Outcome: Ongoing

## 2022-04-18 NOTE — H&P
History and Physical      Name:  Carlos Malhotra /Age/Sex: 1965  (61 y.o. male)   MRN & CSN:  [id number] & [id number] Encounter Date/Time: 2022 3:46 AM EDT   Location:  [facility] PCP: Mariza Castro MD       Hospital Day: 2    Assessment and Plan:   Carlos Malhotra is a 61 y.o. male who presents with       Acute hypoxic respiratory failure / PNA bilateral  Unsure clear eitology, possible pneumonia?  -Has complained of dry coughing, likely related to bronchitis  Azithromycin  pulm consult   continous pulse ox  insentive sona     Mechanical Fall  Knee pain     Colitis on CT scan  GI panel  CT study  Serial abdominal exams  Consider antibiotics    Type 2 diabetes- uncontrolled ? ?   ISS, TIDAC finger stick- Hypoglycemic protocol       New diagnosis hepatic steatosis  LFTS wnl, f/u outpatient    HTN   on Norvasc Hypertension-labile, uncontrolled    Depression   on Paxil.     Seizure disorder  Keppra 500 mg twice daily p.o. twice daily   no new seizures.      History of cerebral palsy          D/w ED provider  Code status Full  DVT PPx Lovenox       History of Present Illness:     Carlos Malhotra is a 61 y.o. male p/w dry cough for the last several days, associated with chest tightness, however denies any shortness of breath. Patient was found to have a hypoxic into the 80s on room air, states that does not use oxygen at home. Patient states that he lives in a group home, has a history of cerebral palsy, has been complains of abdominal pain with a cough, and has had loose stools at home. Has not had any here. Patient does have a sick contact, who is his roommate, otherwise has no complaints. Review of Systems: Need 10 Elements       Pt otherwise has no complaints of  dizziness, N/V/C/D, abdominal pain, dysuria, joint pains, rash/boils, cough or fevers.        Objective:   No intake or output data in the 24 hours ending 22 0346   Vitals:   Vitals:    22 0200 22 0230 04/18/22 0300 04/18/22 0330   BP: 139/87 (!) 140/88 134/85 131/87   Pulse: 83 83 83 82   Resp: 19 19    Temp:       SpO2: 94% 94% 92% 94%   Weight:       Height:           Medications Prior to Admission     Prior to Admission medications    Medication Sig Start Date End Date Taking? Authorizing Provider   albuterol sulfate  (90 Base) MCG/ACT inhaler Inhale 2 puffs into the lungs every 2 hours as needed for Wheezing or Shortness of Breath 6/9/21   Sixto Rivera MD   dicyclomine (BENTYL) 10 MG capsule Take 1 capsule by mouth 3 times daily As needed for abdominal pain 8/4/20   Joseph Turner MD   pantoprazole (PROTONIX) 40 MG tablet Take 1 tablet by mouth 2 times daily (before meals) 12/3/19   Alek Zazueta MD   levothyroxine (SYNTHROID) 25 MCG tablet Take 25 mcg by mouth Daily    Historical Provider, MD   acetaminophen (AMINOFEN) 325 MG tablet Take 2 tablets by mouth every 6 hours as needed for Pain 1/18/19   Sally Arrieta MD   levETIRAcetam (KEPPRA) 500 MG tablet Take 1 tablet by mouth 2 times daily 3/22/16   CASSANDRA Dominguez MD   PARoxetine (PAXIL) 40 MG tablet Take 1 tablet by mouth every morning 3/22/16   C Isabel Dominguez MD   amLODIPine (NORVASC) 10 MG tablet Take 1 tablet by mouth every morning 3/22/16   C Isabel Dominguez MD       Physical Exam: Need 8 Elements   General: NAD   Eyes: EOMI  ENT: neck supple  Cardiovascular: Regular rate. S1, S2, no murmurs NO edema  Respiratory: Symmetrical expansion, mild scattered rhonchi  Gastrointestinal: Soft, non tender  Genitourinary: no suprapubic tenderness  Skin: warm, dry  Neuro: Alert. Oriented to self, situation and hospital  Psych: Mood appropriate. Past Medical History:   PMHx   Past Medical History:   Diagnosis Date    [facility]bral [facility] ([facility] [address]     Depression     Hypertension     Seizures ([facility] [address]      PSHX:  has a past surgical history that includes Eye surgery; Abdomen surgery; and Upper gastrointestinal endoscopy (N/A, 12/3/2019).   Allergies: Allergies   Allergen Reactions    Pcn [Penicillins]      Fam HX:  family history is not on file. Soc HX:   Social History     Socioeconomic History    Marital status: Single     Spouse name: None    Number of children: None    Years of education: None    Highest education level: None   Occupational History    None   Tobacco Use    Smoking status: Never Smoker    Smokeless tobacco: Never Used   Substance and Sexual Activity    Alcohol use: No    Drug use: No    Sexual activity: None   Other Topics Concern    None   Social History Narrative    None     Social Determinants of Health     Financial Resource Strain:     Difficulty of Paying Living Expenses: Not on file   Food Insecurity:     Worried About Running Out of Food in the Last Year: Not on file    Abhijit of Food in the Last Year: Not on file   Transportation Needs:     Lack of Transportation (Medical): Not on file    Lack of Transportation (Non-Medical):  Not on file   Physical Activity:     Days of Exercise per Week: Not on file    Minutes of Exercise per Session: Not on file   Stress:     Feeling of Stress : Not on file   Social Connections:     Frequency of Communication with Friends and Family: Not on file    Frequency of Social Gatherings with Friends and Family: Not on file    Attends Jainism Services: Not on file    Active Member of [address] [place] [address] Me[employer] or Organizations: Not on file    Attends Club or Organization Meetings: Not on file    Marital Status: Not on file   Intimate Partner Violence:     Fear of Current or Ex-Partner: Not on file    Emotionally Abused: Not on file    Physically Abused: Not on file    Sexually Abused: Not on file   Housing Stability:     Unable to Pay for Housing in the Last Year: Not on file    Number of [place] in the Last Year: Not on file    Unstable Housing in the Last Year: Not on file       Medications:   Medications:    Infusions:   PRN Meds:     Labs      CBC:   Recent Labs 04/17/22 2303   WBC 6.0   HGB 14.9        BMP:    Recent Labs     04/17/22 2303      K 3.4*      CO2 25   BUN 12   CREATININE 0.9   GLUCOSE 95     Hepatic:   Recent Labs     04/17/22 2303   AST 35   ALT 40   BILITOT 0.5   ALKPHOS 78     Lipids: No results found for: CHOL, HDL, TRIG  Hemoglobin A1C:   Lab Results   Component Value Date    LABA1C 5.7 06/09/2021     TSH: No results found for: TSH  Troponin:   Lab Results   Component Value Date    TROPONINT <0.010 04/17/2022    TROPONINT <0.010 08/25/2020    TROPONINT <0.010 08/03/2020     Lactic Acid: No results for input(s): LACTA in the last 72 hours. BNP:   Recent Labs     04/17/22 2200   PROBNP 26.35     UA:  Lab Results   Component Value Date    NITRU NEGATIVE 08/03/2020    COLORU STRAW 08/03/2020    WBCUA NONE SEEN 08/03/2020    RBCUA NONE SEEN 08/03/2020    MUCUS RARE 08/03/2020    TRICHOMONAS NONE SEEN 08/03/2020    BACTERIA NEGATIVE 08/03/2020    CLARITYU CLEAR 08/03/2020    SPECGRAV 1.006 08/03/2020    LEUKOCYTESUR NEGATIVE 08/03/2020    UROBILINOGEN NORMAL 08/03/2020    BILIRUBINUR NEGATIVE 08/03/2020    BLOODU SMALL 08/03/2020    KETUA NEGATIVE 08/03/2020     Urine Cultures: No results found for: LABURIN  Blood Cultures: No results found for: BC  No results found for: BLOODCULT2  Organism: No results found for: ORG    Imaging/Diagnostics Last 24 Hours   XR KNEE LEFT (MIN 4 VIEWS)    Result Date: 4/17/2022  EXAMINATION: FOUR XRAY VIEWS OF THE LEFT KNEE 4/17/2022 11:00 pm COMPARISON: None. HISTORY: ORDERING SYSTEM PROVIDED HISTORY: pain TECHNOLOGIST PROVIDED HISTORY: Reason for exam:->pain Reason for Exam: pain Left knee pain FINDINGS: No evidence of acute fracture or dislocation. No focal osseous lesion. No evidence of joint effusion. No focal soft tissue abnormality. No acute abnormality of the knee.      CT ABDOMEN PELVIS W IV CONTRAST Additional Contrast? None    Result Date: 4/18/2022  EXAMINATION: CT OF THE ABDOMEN AND PELVIS WITH CONTRAST 4/18/2022 1:32 am TECHNIQUE: CT of the abdomen and pelvis was performed with the administration of intravenous contrast. Multiplanar reformatted images are provided for review. Dose modulation, iterative reconstruction, and/or weight based adjustment of the mA/kV was utilized to reduce the radiation dose to as low as reasonably achievable. COMPARISON: 06/02/2021 HISTORY: ORDERING SYSTEM PROVIDED HISTORY: abd pain elevated lipase TECHNOLOGIST PROVIDED HISTORY: Reason for exam:->abd pain elevated lipase Additional Contrast?->None Decision Support Exception - unselect if not a suspected or confirmed emergency medical condition->Emergency Medical Condition (MA) Reason for Exam: abd pain elevated lipase FINDINGS: Lower Chest: No significant pericardial effusion is identified. There is a small hiatal hernia. For findings in the chest, please see the dedicated CT scan of the chest report for more complete details. Organs: Diffuse hepatic steatosis is identified. No focal liver or splenic mass is identified. Contracted gallbladder. Adrenal glands are unremarkable. Pancreas is unremarkable. No peripancreatic inflammatory process is identified. No enhancing renal mass is identified. No hydroureteronephrosis is identified. No periureteral stranding is seen. GI/Bowel: No ileus or obstruction is identified. Liquid stool noted throughout the colon. Mild wall thickening involving the proximal transverse colon Pelvis: Bladder appears unremarkable. Prostate gland is enlarged. No free fluid is identified in the pelvis. No bulky pelvic lymphadenopathy is identified. Peritoneum/Retroperitoneum: No abdominal aortic aneurysm is identified. No aortic dissection. No retroperitoneal or mesenteric lymphadenopathy is identified. There are tiny right paramidline omental fat containing peritoneal defect seen on axial image 93 and 99. No bowel herniation.  Bones/Soft Tissues: No acute subcutaneous soft tissue abnormality is identified. Levo scoliotic deformity is seen. Multilevel degenerative changes are seen within the spine. There is a compression fracture at the level of T12, unchanged from the previous examination. No new fracture is identified. 1. Colonic wall thickening is identified involving the proximal transverse colon, with a diffuse fluid-filled colon suggestive of acute colitis. No ileus or obstruction. 2. Diffuse hepatic steatosis. 3. Tiny omental fat containing hernia seen in the right paramidline above the umbilicus. No bowel herniation. 4. Mild prostate enlargement. 5. Atherosclerosis. 6. Small hiatal hernia. XR CHEST PORTABLE    Result Date: 4/17/2022  EXAMINATION: ONE XRAY VIEW OF THE CHEST 4/17/2022 11:00 pm COMPARISON: Chest radiograph dated June 6, 2021 HISTORY: ORDERING SYSTEM PROVIDED HISTORY: cough chest pain TECHNOLOGIST PROVIDED HISTORY: Reason for exam:->cough chest pain Reason for Exam: cough chest pain FINDINGS: There is chronic elevation of the right hemidiaphragm. Cardiomediastinal silhouette is stable. Bronchial wall thickening is seen which could be related to acute bronchitis. There is no focal consolidation or large pleural effusion. There is no definite pneumothorax. Bronchial wall thickening is seen which could be related to acute bronchitis. No focal consolidation. CTA PULMONARY W CONTRAST    1. Evaluation is limited due to motion artifact. No large or central pulmonary emboli identified. Smaller more peripheral pulmonary emboli cannot be entirely excluded due to motion artifact. 2. Elevation of the right hemidiaphragm with right lower lobe atelectasis. Minimal left lower lobe atelectasis. 3. Diffuse fatty infiltration of the liver.            Electronically signed by Isreal Grant MD on 4/18/2022 at 3:46 AM

## 2022-04-18 NOTE — PROGRESS NOTES
Called Nicole who oversees the patient's group home. She states that the patient has a roommate that tested positive for the flu last week with almost identical symptoms, patient has not been tested for influenza this visit. PS sent to Dr Glenroy Adames. She also expressed concerns to this nurse that patient had bilateral lower extremity numbness and right arm pain that is actually what lead them to call the squad. Dr Glenroy Adames updated. Dax Espino is going to fax current home medication list to [address].

## 2022-04-18 NOTE — PROGRESS NOTES
Patient still having diarrhea. Stool sample sent to lab at this time for GI disease panel and C diff.

## 2022-04-18 NOTE — ED TRIAGE NOTES
Per ems \"Pt coming from a group home and is having trouble walking today because of left knee pain and he also has a cough. \"

## 2022-04-19 LAB
ALBUMIN SERPL-MCNC: 3.8 GM/DL (ref 3.4–5)
ALP BLD-CCNC: 80 IU/L (ref 40–128)
ALT SERPL-CCNC: 34 U/L (ref 10–40)
ANION GAP SERPL CALCULATED.3IONS-SCNC: 14 MMOL/L (ref 4–16)
AST SERPL-CCNC: 22 IU/L (ref 15–37)
BASOPHILS ABSOLUTE: 0 K/CU MM
BASOPHILS RELATIVE PERCENT: 0.6 % (ref 0–1)
BILIRUB SERPL-MCNC: 0.8 MG/DL (ref 0–1)
BUN BLDV-MCNC: 10 MG/DL (ref 6–23)
C DIFF AG + TOXIN: NORMAL
CALCIUM SERPL-MCNC: 7.8 MG/DL (ref 8.3–10.6)
CHLORIDE BLD-SCNC: 105 MMOL/L (ref 99–110)
CO2: 23 MMOL/L (ref 21–32)
CREAT SERPL-MCNC: 0.7 MG/DL (ref 0.9–1.3)
DIFFERENTIAL TYPE: ABNORMAL
EKG ATRIAL RATE: 96 BPM
EKG DIAGNOSIS: NORMAL
EKG P AXIS: 40 DEGREES
EKG P-R INTERVAL: 130 MS
EKG Q-T INTERVAL: 370 MS
EKG QRS DURATION: 80 MS
EKG QTC CALCULATION (BAZETT): 467 MS
EKG R AXIS: 26 DEGREES
EKG T AXIS: 31 DEGREES
EKG VENTRICULAR RATE: 96 BPM
EOSINOPHILS ABSOLUTE: 0.5 K/CU MM
EOSINOPHILS RELATIVE PERCENT: 7.9 % (ref 0–3)
GFR AFRICAN AMERICAN: >60 ML/MIN/1.73M2
GFR NON-AFRICAN AMERICAN: >60 ML/MIN/1.73M2
GLUCOSE BLD-MCNC: 117 MG/DL (ref 70–99)
HCT VFR BLD CALC: 44.8 % (ref 42–52)
HEMOGLOBIN: 15 GM/DL (ref 13.5–18)
IMMATURE NEUTROPHIL %: 0.3 % (ref 0–0.43)
LYMPHOCYTES ABSOLUTE: 1.9 K/CU MM
LYMPHOCYTES RELATIVE PERCENT: 31.3 % (ref 24–44)
MAGNESIUM: 2.5 MG/DL (ref 1.8–2.4)
MCH RBC QN AUTO: 30.7 PG (ref 27–31)
MCHC RBC AUTO-ENTMCNC: 33.5 % (ref 32–36)
MCV RBC AUTO: 91.8 FL (ref 78–100)
MONOCYTES ABSOLUTE: 0.7 K/CU MM
MONOCYTES RELATIVE PERCENT: 11.8 % (ref 0–4)
NUCLEATED RBC %: 0 %
PDW BLD-RTO: 11.9 % (ref 11.7–14.9)
PHOSPHORUS: 2.2 MG/DL (ref 2.5–4.9)
PLATELET # BLD: 196 K/CU MM (ref 140–440)
PMV BLD AUTO: 9.8 FL (ref 7.5–11.1)
POTASSIUM SERPL-SCNC: 3 MMOL/L (ref 3.5–5.1)
PRO-BNP: 25.54 PG/ML
PROCALCITONIN: 0.07
RBC # BLD: 4.88 M/CU MM (ref 4.6–6.2)
SEGMENTED NEUTROPHILS ABSOLUTE COUNT: 3 K/CU MM
SEGMENTED NEUTROPHILS RELATIVE PERCENT: 48.1 % (ref 36–66)
SODIUM BLD-SCNC: 142 MMOL/L (ref 135–145)
SOURCE: NORMAL
TOTAL IMMATURE NEUTOROPHIL: 0.02 K/CU MM
TOTAL NUCLEATED RBC: 0 K/CU MM
TOTAL PROTEIN: 6.2 GM/DL (ref 6.4–8.2)
WBC # BLD: 6.2 K/CU MM (ref 4–10.5)

## 2022-04-19 PROCEDURE — 84100 ASSAY OF PHOSPHORUS: CPT

## 2022-04-19 PROCEDURE — 1200000000 HC SEMI PRIVATE

## 2022-04-19 PROCEDURE — 6370000000 HC RX 637 (ALT 250 FOR IP): Performed by: INTERNAL MEDICINE

## 2022-04-19 PROCEDURE — 97535 SELF CARE MNGMENT TRAINING: CPT

## 2022-04-19 PROCEDURE — 2580000003 HC RX 258: Performed by: INTERNAL MEDICINE

## 2022-04-19 PROCEDURE — 83880 ASSAY OF NATRIURETIC PEPTIDE: CPT

## 2022-04-19 PROCEDURE — 84145 PROCALCITONIN (PCT): CPT

## 2022-04-19 PROCEDURE — 85025 COMPLETE CBC W/AUTO DIFF WBC: CPT

## 2022-04-19 PROCEDURE — 6360000002 HC RX W HCPCS: Performed by: INTERNAL MEDICINE

## 2022-04-19 PROCEDURE — 94640 AIRWAY INHALATION TREATMENT: CPT

## 2022-04-19 PROCEDURE — 97116 GAIT TRAINING THERAPY: CPT

## 2022-04-19 PROCEDURE — 93010 ELECTROCARDIOGRAM REPORT: CPT | Performed by: INTERNAL MEDICINE

## 2022-04-19 PROCEDURE — 97162 PT EVAL MOD COMPLEX 30 MIN: CPT

## 2022-04-19 PROCEDURE — 97166 OT EVAL MOD COMPLEX 45 MIN: CPT

## 2022-04-19 PROCEDURE — 94762 N-INVAS EAR/PLS OXIMTRY CONT: CPT

## 2022-04-19 PROCEDURE — 2700000000 HC OXYGEN THERAPY PER DAY

## 2022-04-19 PROCEDURE — 80048 BASIC METABOLIC PNL TOTAL CA: CPT

## 2022-04-19 PROCEDURE — 36415 COLL VENOUS BLD VENIPUNCTURE: CPT

## 2022-04-19 PROCEDURE — 80053 COMPREHEN METABOLIC PANEL: CPT

## 2022-04-19 PROCEDURE — 94761 N-INVAS EAR/PLS OXIMETRY MLT: CPT

## 2022-04-19 PROCEDURE — 83735 ASSAY OF MAGNESIUM: CPT

## 2022-04-19 RX ADMIN — GUAIFENESIN 600 MG: 600 TABLET, EXTENDED RELEASE ORAL at 21:14

## 2022-04-19 RX ADMIN — PANTOPRAZOLE SODIUM 40 MG: 40 TABLET, DELAYED RELEASE ORAL at 16:37

## 2022-04-19 RX ADMIN — AMLODIPINE BESYLATE 10 MG: 10 TABLET ORAL at 09:41

## 2022-04-19 RX ADMIN — SODIUM CHLORIDE, PRESERVATIVE FREE 10 ML: 5 INJECTION INTRAVENOUS at 09:40

## 2022-04-19 RX ADMIN — OSELTAMIVIR PHOSPHATE 75 MG: 75 CAPSULE ORAL at 09:41

## 2022-04-19 RX ADMIN — IPRATROPIUM BROMIDE AND ALBUTEROL SULFATE 1 AMPULE: .5; 2.5 SOLUTION RESPIRATORY (INHALATION) at 23:05

## 2022-04-19 RX ADMIN — TOBRAMYCIN OPHTHALMIC SOLUTION 1 DROP: 3 SOLUTION/ DROPS OPHTHALMIC at 16:37

## 2022-04-19 RX ADMIN — TOBRAMYCIN OPHTHALMIC SOLUTION 1 DROP: 3 SOLUTION/ DROPS OPHTHALMIC at 06:47

## 2022-04-19 RX ADMIN — TOBRAMYCIN OPHTHALMIC SOLUTION 1 DROP: 3 SOLUTION/ DROPS OPHTHALMIC at 00:58

## 2022-04-19 RX ADMIN — POTASSIUM CHLORIDE 10 MEQ: 7.46 INJECTION, SOLUTION INTRAVENOUS at 21:16

## 2022-04-19 RX ADMIN — TOBRAMYCIN OPHTHALMIC SOLUTION 1 DROP: 3 SOLUTION/ DROPS OPHTHALMIC at 12:03

## 2022-04-19 RX ADMIN — GUAIFENESIN 600 MG: 600 TABLET, EXTENDED RELEASE ORAL at 09:41

## 2022-04-19 RX ADMIN — TOBRAMYCIN OPHTHALMIC SOLUTION 1 DROP: 3 SOLUTION/ DROPS OPHTHALMIC at 21:14

## 2022-04-19 RX ADMIN — POTASSIUM CHLORIDE 10 MEQ: 7.46 INJECTION, SOLUTION INTRAVENOUS at 14:17

## 2022-04-19 RX ADMIN — ENOXAPARIN SODIUM 40 MG: 100 INJECTION SUBCUTANEOUS at 09:41

## 2022-04-19 RX ADMIN — IPRATROPIUM BROMIDE AND ALBUTEROL SULFATE 1 AMPULE: .5; 2.5 SOLUTION RESPIRATORY (INHALATION) at 11:00

## 2022-04-19 RX ADMIN — POTASSIUM CHLORIDE 10 MEQ: 7.46 INJECTION, SOLUTION INTRAVENOUS at 16:37

## 2022-04-19 RX ADMIN — AZITHROMYCIN DIHYDRATE 500 MG: 500 INJECTION, POWDER, LYOPHILIZED, FOR SOLUTION INTRAVENOUS at 06:52

## 2022-04-19 RX ADMIN — LEVOTHYROXINE SODIUM 25 MCG: 25 TABLET ORAL at 06:47

## 2022-04-19 RX ADMIN — POTASSIUM CHLORIDE 10 MEQ: 7.46 INJECTION, SOLUTION INTRAVENOUS at 15:23

## 2022-04-19 RX ADMIN — POTASSIUM CHLORIDE 10 MEQ: 7.46 INJECTION, SOLUTION INTRAVENOUS at 17:39

## 2022-04-19 RX ADMIN — POTASSIUM CHLORIDE 10 MEQ: 7.46 INJECTION, SOLUTION INTRAVENOUS at 18:47

## 2022-04-19 RX ADMIN — OSELTAMIVIR PHOSPHATE 75 MG: 75 CAPSULE ORAL at 21:14

## 2022-04-19 RX ADMIN — IPRATROPIUM BROMIDE AND ALBUTEROL SULFATE 1 AMPULE: .5; 2.5 SOLUTION RESPIRATORY (INHALATION) at 08:02

## 2022-04-19 RX ADMIN — LEVETIRACETAM 500 MG: 500 TABLET, FILM COATED ORAL at 21:14

## 2022-04-19 RX ADMIN — IPRATROPIUM BROMIDE AND ALBUTEROL SULFATE 1 AMPULE: .5; 2.5 SOLUTION RESPIRATORY (INHALATION) at 15:01

## 2022-04-19 RX ADMIN — TOBRAMYCIN OPHTHALMIC SOLUTION 1 DROP: 3 SOLUTION/ DROPS OPHTHALMIC at 09:41

## 2022-04-19 RX ADMIN — PAROXETINE HYDROCHLORIDE 40 MG: 20 TABLET, FILM COATED ORAL at 09:41

## 2022-04-19 RX ADMIN — LEVETIRACETAM 500 MG: 500 TABLET, FILM COATED ORAL at 09:40

## 2022-04-19 RX ADMIN — PANTOPRAZOLE SODIUM 40 MG: 40 TABLET, DELAYED RELEASE ORAL at 06:47

## 2022-04-19 ASSESSMENT — PAIN SCALES - GENERAL
PAINLEVEL_OUTOF10: 0

## 2022-04-19 NOTE — PLAN OF CARE
Problem: Pain:  Goal: Pain level will decrease  Description: Pain level will decrease  Outcome: Ongoing  Goal: Control of acute pain  Description: Control of acute pain  Outcome: Ongoing  Goal: Control of chronic pain  Description: Control of chronic pain  Outcome: Ongoing     Problem: Falls - Risk of:  Goal: Will remain free from falls  Description: Will remain free from falls  Outcome: Ongoing  Goal: Absence of physical injury  Description: Absence of physical injury  Outcome: Ongoing     Problem: Diarrhea:  Goal: Bowel elimination is within specified parameters  Description: Bowel elimination is within specified parameters  Outcome: Ongoing     Problem: Diarrhea:  Goal: Passage of soft, formed stool  Description: Passage of soft, formed stool  Outcome: Ongoing     Problem: Diarrhea:  Goal: Establishment of normal bowel function will improve to within specified parameters  Description: Establishment of normal bowel function will improve to within specified parameters  Outcome: Ongoing     Problem: OXYGENATION/RESPIRATORY FUNCTION  Goal: Patient will maintain patent airway  Outcome: Ongoing  Goal: Patient will achieve/maintain normal respiratory rate/effort  Description: Respiratory rate and effort will be within normal limits for the patient  Outcome: Ongoing     Problem: MECHANICAL VENTILATION  Goal: Patient will maintain patent airway  Outcome: Ongoing  Goal: Oral health is maintained or improved  Outcome: Ongoing  Goal: Tracheostomy will be managed safely  Outcome: Ongoing  Goal: ET tube will be managed safely  Outcome: Ongoing  Goal: Ability to express needs and understand communication  Outcome: Ongoing  Goal: Mobility/activity is maintained at optimum level for patient  Outcome: Ongoing     Problem: SKIN INTEGRITY  Goal: Skin integrity is maintained or improved  Outcome: Ongoing     Problem: NUTRITION  Goal: Nutritional status is improving  Outcome: Ongoing

## 2022-04-19 NOTE — CONSULTS
[address] [facility] ACUTE CARE PHYSICAL THERAPY EVALUATION  Nash Vela, 1965, [id number], 4/19/2022    History  Solomon:  The primary encounter diagnosis was Hypoxia. Diagnoses of Cough, Abdominal pain, unspecified abdominal location, Acute pain of left knee, and Elevated lipase were also pertinent to this visit. Patient  has a past medical history of Cerebral palsy ([facility] [address] Depression, Hypertension, and Seizures ([facility] [address] Patient  has a past surgical history that includes Eye surgery; Abdomen surgery; and Upper gastrointestinal endoscopy (N/A, 12/3/2019). Subjective:  Patient states:  \"I usually work 4 days a week but only 3 hours at a time\". Pain:  No c/o. Communication with other providers:  Handoff to RN, [facility]  [facility]. Restrictions: Fall risk, Droplet precautions, tele, 02, Sp02. Home Setup/Prior level of function  Social/Functional History  Lives With:  (group home)  Type of Home: Assisted living (group home.  Usually have 2 aides during day)  Home Layout: One level  Home Access: Level entry  Bathroom Shower/Tub: Walk-in shower  Bathroom Equipment: [address] chair (pt usually independent with showers)  Bathroom Accessibility: [facility]:  (no use of AD typically, does not own)  Receives Help From: Personal care attendant  ADL Assistance: Independent (intermittent assist with high level ADL)  Homemaking Assistance: Needs assistance  Homemaking Responsibilities: No  Ambulation Assistance: Independent  Transfer Assistance: Independent  Active : No  Patient's  Info: DD services bus  Mode of Transportation: Bus  Occupation: Part time employment  Type of occupation: [employer] [address] [place]   Additional Comments: pt without need for AD at baseline    Examination of body systems (includes body structures/functions, activity/participation limitations):  · Observation:  Pt is awake in semi fowlers upon arrival, Sp02 stable in 90's on supplemental 02. · Vision:  [facility] [place]  · Hearing:  [facility]  · Cardiopulmonary: On supplemental 02, Sp02 decreased to 92%, increased quickly to 95% with rest.  · Cognition: Impaired at baseline, pt is A&O, pleasant, see OT/SLP note for further evaluation. Musculoskeletal  · ROM R/L:  WFL BLE, h/o CP    · Strength R/L:  Generally equal BLE 4-/5, decreased in function and endurance. · Neuro:  H/o CP, observed increased tone LUE with elbow flexion posture and L finger extension. · Gait pattern: Pt demonstrates step-to pattern on the R with decreased stance time and WB on the LLE compared to R. Increased R trunk lean in L swing phase, shuffling/hemiplegic gait without significant LOB. Mobility:  · Supine to sit:  Min  · Transfers: CGA  · Sitting balance:  CGA-SBA. · Standing balance:  CGA. · Gait: MIN-CGA    [facility] 6 Clicks Inpatient Mobility:  AM-PAC Inpatient Mobility Raw Score : 16    Treatment:    Bed mobility: PT encourages sup>sit, provides v/c for sequencing. Pt demonstrates fair ability to advance LE, requires Min A for LE/ hips to EOB and Min A for trunk to upright, Min increased time and UE support. PT v/c for scooting to EOB, pt requires CGA-SBA for safety as pt with min increased trunk sway. Sitting balance: Seated EOB pt demonstrates fair- balance, intermittent UE support required for maintaining upright. Pt performs donning of shoes with assist for management of velcro, SBA. Pt able to perform dynamic standing tasks without significant LOB. Sit<>Stand: Pt performed STS from EOB to RW  with CGA, v/c for proper sequencing. Pt demonstrates increased time to upright, increased R trunk lean d/t favoring of R side. Return to seated in recliner pt demonstrates fair- control, v/c for safe sequencing. Additional sit<>stand at recliner for rest break between gait CGA. Gait: Pt AMB x15 ft, followed by seated rest x2' and additional x15 ft.  Pt with Min-CGA for management of RW, step-to pattern on functional tasks, CGA.        Treatment plan:  Bed mobility, transfers, balance, gait, TA, TX    Recommendations for NURSING mobility: AMB x15 ft with RW, to/from BR Min A    Time:   Time in: 13:10  Time out: 13:34  Timed treatment minutes: 14  Total time: 24    Electronically signed by:    Marybeth Myers, PT  9/05/9559, 1:61 PM  PT Lic #: [id number]

## 2022-04-19 NOTE — PROGRESS NOTES
Pulmonary and Critical Care  Progress Note    Subjective: The patient is comfortable in bed. PCCR pos for influenza A. Started on Tamiflu. Shortness of breath better. Chest pain none  Addressing respiratory complaints Patient is negative for  hemoptysis and cyanosis  CONSTITUTIONAL:  negative for fevers and chills      Past Medical History:     has a past medical history of Cerebral palsy ([facility] [address] Depression, Hypertension, and Seizures ([facility] [address] has a past surgical history that includes Eye surgery; Abdomen surgery; and Upper gastrointestinal endoscopy (N/A, 12/3/2019). reports that he has never smoked. He has never used smokeless tobacco. He reports that he does not drink alcohol and does not use drugs. Family history:  family history is not on file. Allergies   Allergen Reactions    Pcn [Penicillins]      Social History:    Reviewed; no changes    Objective:   PHYSICAL EXAM:        VITALS:  /86   Pulse 79   Temp 98.2 °F (36.8 °C) (Oral)   Resp 19   Ht 5' 8\" (1.727 m)   Wt 162 lb 11.2 oz (73.8 kg)   SpO2 93%   BMI 24.74 kg/m²     24HR INTAKE/OUTPUT:      Intake/Output Summary (Last 24 hours) at 4/19/2022 1102  Last data filed at 4/19/2022 1000  Gross per 24 hour   Intake 250 ml   Output 350 ml   Net -100 ml       CONSTITUTIONAL:  awake  LUNGS:  decreased breath sounds, occ basilar crackles. CARDIOVASCULAR:  normal S1 and S2 and negative JVD  ABD:Abdomen soft, non-tender. BS normal. No masses,  No organomegaly  NEURO:Alert and oriented x3. Gait normal. Reflexes and motor strength normal and symmetric. Cranial nerves 2-12 and sensation grossly intact.   DATA:    CBC:  Recent Labs     04/17/22  2303 04/19/22  0716   WBC 6.0 6.2   RBC 4.76 4.88   HGB 14.9 15.0   HCT 44.1 44.8    196   MCV 92.6 91.8   MCH 31.3* 30.7   MCHC 33.8 33.5   RDW 11.9 11.9   SEGSPCT 40.4 48.1      BMP:  Recent Labs     04/17/22  2303      K 3.4*      CO2 25   BUN 12   CREATININE 0.9   CALCIUM 8.0*   GLUCOSE 95      ABG:  No results for input(s): PH, PO2ART, ZLC0ZYP, HCO3, BEART, O2SAT in the last 72 hours. Lab Results   Component Value Date    PROBNP 26.35 04/17/2022    PROBNP 35.21 06/08/2021    PROBNP 31.09 06/04/2021     No results found for: [facility]    Radiology Review:  Pertinent images / reports were reviewed as a part of this visit. Assessment:     Patient Active Problem List   Diagnosis    Pneumonia    Cerebral palsy, hemiplegic (HCC)    Hypoxia    Seizure disorder ([facility] [address]    Gait disturbance    Pneumonia of both lungs due to infectious organism    Acute upper GI bleed    Cecal polyp    Sebaceous cyst       Plan:   1. Overall the patient has slightly improved. 2. [address] Activity.     Maci Rice MD   4/19/2022  11:02 AM

## 2022-04-19 NOTE — PLAN OF CARE
Problem: Pain:  Goal: Pain level will decrease  Description: Pain level will decrease  4/18/2022 2248 by Michelle Torres LPN  Outcome: Ongoing  4/18/2022 1032 by Evita Yuan LPN  Outcome: Ongoing  Goal: Control of acute pain  Description: Control of acute pain  4/18/2022 2248 by Michelle Torres LPN  Outcome: Ongoing  4/18/2022 1032 by Evita Yuan LPN  Outcome: Ongoing  Goal: Control of chronic pain  Description: Control of chronic pain  4/18/2022 2248 by Michelle Torres LPN  Outcome: Ongoing  4/18/2022 1032 by Evita Yuan LPN  Outcome: Ongoing     Problem: Falls - Risk of:  Goal: Will remain free from falls  Description: Will remain free from falls  4/18/2022 2248 by Michelle Torres LPN  Outcome: Ongoing  4/18/2022 1032 by Evita Yuan LPN  Outcome: Ongoing  Goal: Absence of physical injury  Description: Absence of physical injury  4/18/2022 2248 by Michelle Torres LPN  Outcome: Ongoing  4/18/2022 1032 by Evita Yuan LPN  Outcome: Ongoing     Problem: Diarrhea:  Goal: Bowel elimination is within specified parameters  Description: Bowel elimination is within specified parameters  4/18/2022 2248 by Michelle Torres LPN  Outcome: Ongoing  4/18/2022 1032 by Evita Yuan LPN  Outcome: Ongoing  Goal: Passage of soft, formed stool  Description: Passage of soft, formed stool  4/18/2022 2248 by Michelle Torres LPN  Outcome: Ongoing  4/18/2022 1032 by Evita Yuan LPN  Outcome: Ongoing  Goal: Establishment of normal bowel function will improve to within specified parameters  Description: Establishment of normal bowel function will improve to within specified parameters  4/18/2022 2248 by Michelle Torres LPN  Outcome: Ongoing  4/18/2022 1032 by Evita Yuan LPN  Outcome: Ongoing     Problem: OXYGENATION/RESPIRATORY FUNCTION  Goal: Patient will maintain patent airway  4/18/2022 2248 by Michelle Torres LPN  Outcome: Ongoing  4/18/2022 1032 by Evita Yuan LPN  Outcome: Ongoing  Goal: Patient will achieve/maintain normal respiratory rate/effort  Description: Respiratory rate and effort will be within normal limits for the patient  4/18/2022 2248 by Parris Rubio LPN  Outcome: Ongoing  4/18/2022 1032 by Stacie Goltz, LPN  Outcome: Ongoing     Problem: MECHANICAL VENTILATION  Goal: Patient will maintain patent airway  4/18/2022 2248 by Parris Rubio LPN  Outcome: Ongoing  4/18/2022 1032 by Stacie Goltz, LPN  Outcome: Ongoing  Goal: Oral health is maintained or improved  4/18/2022 2248 by Parris Rubio LPN  Outcome: Ongoing  4/18/2022 1032 by Stacie Goltz, LPN  Outcome: Ongoing  Goal: Tracheostomy will be managed safely  4/18/2022 2248 by Parris Rubio LPN  Outcome: Ongoing  4/18/2022 1032 by Stacie Goltz, LPN  Outcome: Ongoing  Goal: ET tube will be managed safely  4/18/2022 2248 by Parris Rubio LPN  Outcome: Ongoing  4/18/2022 1032 by Stacie Goltz, LPN  Outcome: Ongoing  Goal: Ability to express needs and understand communication  4/18/2022 2248 by Parris Rubio LPN  Outcome: Ongoing  4/18/2022 1032 by Stacie Goltz, LPN  Outcome: Ongoing  Goal: Mobility/activity is maintained at optimum level for patient  4/18/2022 2248 by Parris Rubio LPN  Outcome: Ongoing  4/18/2022 1032 by Stacie Goltz, LPN  Outcome: Ongoing     Problem: SKIN INTEGRITY  Goal: Skin integrity is maintained or improved  4/18/2022 2248 by Parris Rubio LPN  Outcome: Ongoing  4/18/2022 1032 by Stacie Goltz, LPN  Outcome: Ongoing     Problem: NUTRITION  Goal: Nutritional status is improving  4/18/2022 2248 by Parris Rubio LPN  Outcome: Ongoing  4/18/2022 1032 by Stacie Goltz, LPN  Outcome: Ongoing

## 2022-04-19 NOTE — PROGRESS NOTES
Occupational Therapy    [facility] ACUTE CARE OCCUPATIONAL THERAPY EVALUATION  Ronal Davenport, 1965, 3002/3002-A, 4/19/2022    History  [place]:  The primary encounter diagnosis was Hypoxia. Diagnoses of Cough, Abdominal pain, unspecified abdominal location, Acute pain of left knee, and Elevated lipase were also pertinent to this visit. Patient  has a past medical history of Cerebral palsy ([address] Depression, Hypertension, and Seizures ([address] Patient  has a past surgical history that includes Eye surgery; Abdomen surgery; and Upper gastrointestinal endoscopy (N/A, 12/3/2019). Subjective:  Patient states: \"It feels a little harder than normal\". Pain:  No.    Communication with other providers:  Handoff to RN, co-eval with PT. Restrictions: Droplet Precautions, General Precautions, Fall Risk    Home Setup/Prior level of function  Social/Functional History  Lives With:  (group home)  Type of Home: Assisted living (group home.  Usually have 2 aides during day)  Home Layout: One level  Home Access: Level entry  Bathroom Shower/Tub: Walk-in shower  Bathroom Equipment: [address] [employer] chair (pt usually independent with showers)  Bathroom Accessibility: [facility]:  (no use of AD typically, does not own)  Receives Help From: Personal care attendant  ADL Assistance: Independent (intermittent assist with high level ADL)  Homemaking Assistance: Needs assistance  Homemaking Responsibilities: No  Ambulation Assistance: Independent  Transfer Assistance: Independent  Active : No  Patient's  Info: DD services bus  Mode of Transportation: Bus  Occupation: Part time employment  Type of occupation: [address] [place]   Additional Comments: pt without need for AD at baseline    Examination of body systems (includes body structures/functions, activity/participation limitations):  · Observation:  Supine in bed upon arrival, agreeable to therapy   · Vision:  Glasses  · Hearing: [facility]  · Cardiopulmonary: On supplemental 02. 02 decreased ~92% during functional mobility. Increased ~95% w/ 2 minutes of seated rest break      Body Systems and functions:  · ROM R/L:  WFL. · Strength R/L:  4/5,   · Sensation: WFL  · Tone: Normal  · Coordination: WFL  · Perception: WNL    Activities of Daily Living (ADLs):  · Feeding: Independent  · Grooming: CGA (washing hands/face w/ warm washcloth)  · UB bathing: Supervision  · LB bathing: CGA  · UB dressing: Supervision  · LB dressing: CGA  · Toileting: CGA    Cognitive and Psychosocial Functioning:  · Overall cognitive status: MRDD at baseline. Otherwise WFL AxO x 4  · Affect: Pleasant        Mobility:  · Supine to sit:  [place]  · Transfers: CGA from EOB up to Rw  · Sitting balance:  Supervision. · Standing balance:  CGA w/ RW.  · Functional Mobility: [place] w/ RW (See PT notes for gait details)  · Toilet/Shower Transfers: DNT             [facility] Daily Activity Inpatient   How much help for putting on and taking off regular lower body clothing?: A Little  How much help for Bathing?: A Little  How much help for Toileting?: A Little  How much help for putting on and taking off regular upper body clothing?: None  How much help for taking care of personal grooming?: A Little  How much help for eating meals?: None  [facility] Inpatient Daily Activity Raw Score: 20  AM-[facility] Inpatient ADL T-Scale Score : 42.03  ADL Inpatient CMS 0-100% Score: 38.32  ADL Inpatient CMS G-Code Modifier : CJ    Treatment:  Self Care Training:   Cues were given for safety, sequence, UE/LE placement, visual cues, and balance. Activities performed today included grooming    Safety: patient left in chair with chair alarm, call light within reach, RN notified, gait belt used. Assessment:  Pt is a 62 yo male admitted from home for hypoxia. Pt at baseline is Independent for ADLs Independent for high level IADLs and Independent for functional transfers/mobility w/ AD.  Pt currently presents w/ deficits in ADL and high level IADL independence, functional activity tolerance, dynamic sitting and standing balance and tolerance and functional transfers, BUE strength. Pt would benefit from continued acute care OT services w/ discharge to SNF  Complexity: Moderate  Prognosis: Good, no significant barriers to participation at this time.    Plan  Times per week: 3x+  Times per day: Daily  Current Treatment Recommendations: Strengthening,Endurance Training,Patient/Caregiver Education & Training,Equipment Evaluation, Education, & procurement,Self-Care / Cedric Kelly Management Training,Functional Mobility Training,Safety Education & Training,Positioning     Equipment: defer    Goals:  Pt goal: go home  Time Frame for STGs: discharge  Goal 1: Pt will perform UE ADLs Independent  Goal 2: Pt will perform LE ADLs Independent  Goal 3: Pt will perform toileting Independent  Goal 4: Pt will perform functional transfer w/ AD Cooper  Goal 5: Pt will perform functional mobility w/ AD Cooper  Goal 6: Pt will perform therex/theract in order to increase functional activity tolerance and dynamic standing balance    Treatment plan:  Pt will perform functional task in stand reaching in all 3 planes in order to increase dynamic standing balance and functional activity tolerance    Recommendations for NURSING activity: Up to chair for all 3 meals and up to standard commode for all toileting needs    Time:   Time in: 1310  Time out: 1334  Timed treatment minutes: 9 minutes  Total time: 24 minutes    Electronically signed by:    Kyar KHAN/LIZZY [id number]  2:27 PM,4/19/2022

## 2022-04-19 NOTE — CARE COORDINATION
RN informed this LSW in the IDR this morning that pt is a 2 person max assist.  Pt is form a group home is independent pt works at [employer] 3-4 days a week. Pt may need SNf. WB placed asking for PT/OT evals.

## 2022-04-19 NOTE — PROGRESS NOTES
Hospitalist Progress Note      Name:  Isabel Olmedo /Age/Sex: 1965  (61 y.o. male)   MRN & CSN:  [id number] & [id number] Admission Date/Time: 2022 10:30 PM   Location:  [address] [place] [address] PCP: Joselyn Keyes MD         Hospital Day: 3    Assessment and Plan:       Acute hypoxic respiratory failure most likely secondary to influenza bronchitis:  Continue on supplemental oxygen to keep O2 sat above 90%  CTA chest showed no evidence of PE  There is no infiltrates or consolidation  Continue on IV antibiotic for prophylaxis  Continue on Tamiflu  Continue on cough medicine Mucinex 600 mg twice a day and breathing treatment  Wean off oxygen gradually  Follow sputum culture  Pulmonary specialist on board      CT scan of the abdomen showed possible colitis: Patient reported liquidy stool at home however no diarrhea reported while in hospital  The patient developed watery stool will check C. Difficile  Hold on antibiotic for now  Patient denies nausea vomiting or abdominal pain  Continue on PPI    Mechanical fall and knee pain: Patient history of cerebral palsy  PT OT evaluation  X-ray of the left knee nonacute    Hypertension continue on home blood pressure medication  Diet ADULT DIET; Regular   DVT Prophylaxis [x] Lovenox, []  Heparin, [] SCDs, [] Ambulation   GI Prophylaxis [x] PPI,  [] H2 Blocker,  [] Carafate,  [] Diet/Tube Feeds   Code Status Full Code   Disposition Patient requires continued admission due to    MDM [] Low, [] Moderate,[]  High  Patient's risk as above due to      History of Present Illness: The patient was seen and examined at the bedside  Patient still has some shortness of breath and dry cough  Currently on 2 L nasal cannula oxygen the patient does not use oxygen at home  Patient denies diarrhea currently  No nausea or vomiting    Objective:      Intake/Output Summary (Last 24 hours) at 2022 0830  Last data filed at 2022 1423  Gross per 24 hour   Intake 240 ml   Output 200 ml   Net 40 ml      Vitals:   Vitals:    04/19/22 0803   BP:    Pulse:    Resp: 20   Temp:    SpO2: 94%     Physical Exam:   GEN Awake.  Alert , not in respiratory distress, not in pain  HEENT: PEERLA, , supple neck,   Chest: air entry equal bilaterally, no wheezing or crepitation, decreased breathing bilaterally  Heart: S1 and S2 heard, no murmur, no gallop or rub, regular rate  Abdomen: soft, ND , Nt, +BS  Extremities: no cyanosis, tenderness or erythema, peripheral pulses audible  Neurology: alert, oriented x3, able to move 4 limbs, weakness and contracture of the right  upper extremity due to cerebral palsy    Medications:   Medications:    levETIRAcetam  500 mg Oral BID    PARoxetine  40 mg Oral QAM    amLODIPine  10 mg Oral QAM    levothyroxine  25 mcg Oral Daily    pantoprazole  40 mg Oral BID AC    sodium chloride flush  10 mL IntraVENous 2 times per day    enoxaparin  40 mg SubCUTAneous Daily    azithromycin  500 mg IntraVENous Q24H    ipratropium-albuterol  1 ampule Inhalation Q4H WA    guaiFENesin  600 mg Oral BID    oseltamivir  75 mg Oral BID    tobramycin  1 drop Both Eyes 6 times per day      Infusions:    sodium chloride       PRN Meds: albuterol sulfate HFA, 2 puff, Q2H PRN  sodium chloride flush, 10 mL, PRN  sodium chloride, , PRN  ondansetron, 4 mg, Q8H PRN   Or  ondansetron, 4 mg, Q6H PRN  bisacodyl, 5 mg, Daily PRN  acetaminophen, 650 mg, Q6H PRN   Or  acetaminophen, 650 mg, Q6H PRN  potassium chloride, 40 mEq, PRN   Or  potassium alternative oral replacement, 40 mEq, PRN   Or  potassium chloride, 10 mEq, PRN          Electronically signed by Monique Thao MD on 4/19/2022 at 8:30 AM

## 2022-04-20 LAB
ALBUMIN SERPL-MCNC: 4.1 GM/DL (ref 3.4–5)
ALP BLD-CCNC: 94 IU/L (ref 40–129)
ALT SERPL-CCNC: 33 U/L (ref 10–40)
ANION GAP SERPL CALCULATED.3IONS-SCNC: 13 MMOL/L (ref 4–16)
AST SERPL-CCNC: 21 IU/L (ref 15–37)
BASOPHILS ABSOLUTE: 0.1 K/CU MM
BASOPHILS RELATIVE PERCENT: 0.7 % (ref 0–1)
BILIRUB SERPL-MCNC: 0.7 MG/DL (ref 0–1)
BUN BLDV-MCNC: 8 MG/DL (ref 6–23)
CALCIUM SERPL-MCNC: 8.1 MG/DL (ref 8.3–10.6)
CHLORIDE BLD-SCNC: 104 MMOL/L (ref 99–110)
CO2: 22 MMOL/L (ref 21–32)
CREAT SERPL-MCNC: 0.7 MG/DL (ref 0.9–1.3)
DIFFERENTIAL TYPE: ABNORMAL
EOSINOPHILS ABSOLUTE: 0.6 K/CU MM
EOSINOPHILS RELATIVE PERCENT: 6.6 % (ref 0–3)
GFR AFRICAN AMERICAN: >60 ML/MIN/1.73M2
GFR NON-AFRICAN AMERICAN: >60 ML/MIN/1.73M2
GLUCOSE BLD-MCNC: 101 MG/DL (ref 70–99)
HCT VFR BLD CALC: 47.2 % (ref 42–52)
HEMOGLOBIN: 16.1 GM/DL (ref 13.5–18)
IMMATURE NEUTROPHIL %: 0.2 % (ref 0–0.43)
LYMPHOCYTES ABSOLUTE: 2.5 K/CU MM
LYMPHOCYTES RELATIVE PERCENT: 28.7 % (ref 24–44)
MCH RBC QN AUTO: 31.3 PG (ref 27–31)
MCHC RBC AUTO-ENTMCNC: 34.1 % (ref 32–36)
MCV RBC AUTO: 91.7 FL (ref 78–100)
MONOCYTES ABSOLUTE: 0.8 K/CU MM
MONOCYTES RELATIVE PERCENT: 9.2 % (ref 0–4)
NUCLEATED RBC %: 0 %
PDW BLD-RTO: 11.9 % (ref 11.7–14.9)
PLATELET # BLD: 242 K/CU MM (ref 140–440)
PMV BLD AUTO: 9.1 FL (ref 7.5–11.1)
POTASSIUM SERPL-SCNC: 3.2 MMOL/L (ref 3.5–5.1)
POTASSIUM SERPL-SCNC: 3.3 MMOL/L (ref 3.5–5.1)
RBC # BLD: 5.15 M/CU MM (ref 4.6–6.2)
SEGMENTED NEUTROPHILS ABSOLUTE COUNT: 4.7 K/CU MM
SEGMENTED NEUTROPHILS RELATIVE PERCENT: 54.6 % (ref 36–66)
SODIUM BLD-SCNC: 139 MMOL/L (ref 135–145)
TOTAL IMMATURE NEUTOROPHIL: 0.02 K/CU MM
TOTAL NUCLEATED RBC: 0 K/CU MM
TOTAL PROTEIN: 7.1 GM/DL (ref 6.4–8.2)
WBC # BLD: 8.6 K/CU MM (ref 4–10.5)

## 2022-04-20 PROCEDURE — 94640 AIRWAY INHALATION TREATMENT: CPT

## 2022-04-20 PROCEDURE — 6370000000 HC RX 637 (ALT 250 FOR IP): Performed by: INTERNAL MEDICINE

## 2022-04-20 PROCEDURE — 80053 COMPREHEN METABOLIC PANEL: CPT

## 2022-04-20 PROCEDURE — 85027 COMPLETE CBC AUTOMATED: CPT

## 2022-04-20 PROCEDURE — 84132 ASSAY OF SERUM POTASSIUM: CPT

## 2022-04-20 PROCEDURE — 80048 BASIC METABOLIC PNL TOTAL CA: CPT

## 2022-04-20 PROCEDURE — 2580000003 HC RX 258: Performed by: INTERNAL MEDICINE

## 2022-04-20 PROCEDURE — 1200000000 HC SEMI PRIVATE

## 2022-04-20 PROCEDURE — 94761 N-INVAS EAR/PLS OXIMETRY MLT: CPT

## 2022-04-20 PROCEDURE — 85025 COMPLETE CBC W/AUTO DIFF WBC: CPT

## 2022-04-20 PROCEDURE — 6360000002 HC RX W HCPCS: Performed by: INTERNAL MEDICINE

## 2022-04-20 PROCEDURE — 2700000000 HC OXYGEN THERAPY PER DAY

## 2022-04-20 PROCEDURE — 36415 COLL VENOUS BLD VENIPUNCTURE: CPT

## 2022-04-20 RX ADMIN — LEVETIRACETAM 500 MG: 500 TABLET, FILM COATED ORAL at 09:11

## 2022-04-20 RX ADMIN — OSELTAMIVIR PHOSPHATE 75 MG: 75 CAPSULE ORAL at 21:07

## 2022-04-20 RX ADMIN — OSELTAMIVIR PHOSPHATE 75 MG: 75 CAPSULE ORAL at 09:11

## 2022-04-20 RX ADMIN — LEVOTHYROXINE SODIUM 25 MCG: 25 TABLET ORAL at 06:21

## 2022-04-20 RX ADMIN — IPRATROPIUM BROMIDE AND ALBUTEROL SULFATE 1 AMPULE: .5; 2.5 SOLUTION RESPIRATORY (INHALATION) at 15:12

## 2022-04-20 RX ADMIN — PANTOPRAZOLE SODIUM 40 MG: 40 TABLET, DELAYED RELEASE ORAL at 16:52

## 2022-04-20 RX ADMIN — TOBRAMYCIN OPHTHALMIC SOLUTION 1 DROP: 3 SOLUTION/ DROPS OPHTHALMIC at 23:56

## 2022-04-20 RX ADMIN — LEVETIRACETAM 500 MG: 500 TABLET, FILM COATED ORAL at 21:07

## 2022-04-20 RX ADMIN — TOBRAMYCIN OPHTHALMIC SOLUTION 1 DROP: 3 SOLUTION/ DROPS OPHTHALMIC at 04:27

## 2022-04-20 RX ADMIN — SODIUM CHLORIDE, PRESERVATIVE FREE 10 ML: 5 INJECTION INTRAVENOUS at 09:12

## 2022-04-20 RX ADMIN — TOBRAMYCIN OPHTHALMIC SOLUTION 1 DROP: 3 SOLUTION/ DROPS OPHTHALMIC at 11:39

## 2022-04-20 RX ADMIN — IPRATROPIUM BROMIDE AND ALBUTEROL SULFATE 1 AMPULE: .5; 2.5 SOLUTION RESPIRATORY (INHALATION) at 11:03

## 2022-04-20 RX ADMIN — GUAIFENESIN 600 MG: 600 TABLET, EXTENDED RELEASE ORAL at 21:07

## 2022-04-20 RX ADMIN — AZITHROMYCIN DIHYDRATE 500 MG: 500 INJECTION, POWDER, LYOPHILIZED, FOR SOLUTION INTRAVENOUS at 06:30

## 2022-04-20 RX ADMIN — PANTOPRAZOLE SODIUM 40 MG: 40 TABLET, DELAYED RELEASE ORAL at 06:21

## 2022-04-20 RX ADMIN — POTASSIUM CHLORIDE 40 MEQ: 20 TABLET, EXTENDED RELEASE ORAL at 13:09

## 2022-04-20 RX ADMIN — TOBRAMYCIN OPHTHALMIC SOLUTION 1 DROP: 3 SOLUTION/ DROPS OPHTHALMIC at 09:12

## 2022-04-20 RX ADMIN — TOBRAMYCIN OPHTHALMIC SOLUTION 1 DROP: 3 SOLUTION/ DROPS OPHTHALMIC at 21:12

## 2022-04-20 RX ADMIN — AMLODIPINE BESYLATE 10 MG: 10 TABLET ORAL at 09:11

## 2022-04-20 RX ADMIN — ENOXAPARIN SODIUM 40 MG: 100 INJECTION SUBCUTANEOUS at 09:11

## 2022-04-20 RX ADMIN — IPRATROPIUM BROMIDE AND ALBUTEROL SULFATE 1 AMPULE: .5; 2.5 SOLUTION RESPIRATORY (INHALATION) at 07:18

## 2022-04-20 RX ADMIN — TOBRAMYCIN OPHTHALMIC SOLUTION 1 DROP: 3 SOLUTION/ DROPS OPHTHALMIC at 00:17

## 2022-04-20 RX ADMIN — GUAIFENESIN 600 MG: 600 TABLET, EXTENDED RELEASE ORAL at 09:11

## 2022-04-20 RX ADMIN — SODIUM CHLORIDE, PRESERVATIVE FREE 10 ML: 5 INJECTION INTRAVENOUS at 21:13

## 2022-04-20 RX ADMIN — PAROXETINE HYDROCHLORIDE 40 MG: 20 TABLET, FILM COATED ORAL at 09:11

## 2022-04-20 ASSESSMENT — PAIN SCALES - GENERAL
PAINLEVEL_OUTOF10: 0
PAINLEVEL_OUTOF10: 0

## 2022-04-20 NOTE — PROGRESS NOTES
Pulmonary and Critical Care  Progress Note    Subjective: The patient is better. Shortness of breath better. Chest pain none  Addressing respiratory complaints Patient is negative for  hemoptysis and cyanosis  CONSTITUTIONAL:  negative for fevers and chills      Past Medical History:     has a past medical history of Cerebral palsy ([facility] [address] [facility] Depression, Hypertension, and Seizures ([facility] [address] has a past surgical history that includes Eye surgery; Abdomen surgery; and Upper gastrointestinal endoscopy (N/A, 12/3/2019). reports that he has never smoked. He has never used smokeless tobacco. He reports that he does not drink alcohol and does not use drugs. Family history:  family history is not on file. Allergies   Allergen Reactions    Pcn [Penicillins]      Social History:    Reviewed; no changes    Objective:   PHYSICAL EXAM:        VITALS:  BP (!) 142/95   Pulse 91   Temp 98.6 °F (37 °C) (Oral)   Resp 25   Ht 5' 8\" (1.727 m)   Wt 166 lb 3.6 oz (75.4 kg)   SpO2 93%   BMI 25.27 kg/m²     24HR INTAKE/OUTPUT:      Intake/Output Summary (Last 24 hours) at 4/20/2022 1150  Last data filed at 4/19/2022 1857  Gross per 24 hour   Intake --   Output 100 ml   Net -100 ml       CONSTITUTIONAL:  Awake. LUNGS:  decreased breath sounds, occ basilar crackles. CARDIOVASCULAR:  normal S1 and S2 and negative JVD  ABD:Abdomen soft, non-tender. BS normal. No masses,  No organomegaly  NEURO:Alert and oriented x3. Gait normal. Reflexes and motor strength normal and symmetric. Cranial nerves 2-12 and sensation grossly intact.   DATA:    CBC:  Recent Labs     04/17/22 2303 04/19/22  0716 04/20/22  0956   WBC 6.0 6.2 8.6   RBC 4.76 4.88 5.15   HGB 14.9 15.0 16.1   HCT 44.1 44.8 47.2    196 242   MCV 92.6 91.8 91.7   MCH 31.3* 30.7 31.3*   MCHC 33.8 33.5 34.1   RDW 11.9 11.9 11.9   SEGSPCT 40.4 48.1 54.6      BMP:  Recent Labs     04/17/22 2303 04/19/22  0716 04/20/22  0956    142 139   K 3.4* 3.0* 3.3*    105 104   CO2 25 23 22   BUN 12 10 8   CREATININE 0.9 0.7* 0.7*   CALCIUM 8.0* 7.8* 8.1*   GLUCOSE 95 117* 101*      ABG:  No results for input(s): PH, PO2ART, IVJ7LTB, HCO3, BEART, O2SAT in the last 72 hours. Lab Results   Component Value Date    PROBNP 25.54 04/19/2022    PROBNP 26.35 04/17/2022    PROBNP 35.21 06/08/2021     No results found for: [facility]    Radiology Review:  Pertinent images / reports were reviewed as a part of this visit. Assessment:     Patient Active Problem List   Diagnosis    Pneumonia    Cerebral palsy, hemiplegic (HCC)    Hypoxia    Seizure disorder ([facility]    Gait disturbance    Pneumonia of both lungs due to infectious organism    Acute upper GI bleed    Cecal polyp    Sebaceous cyst       Plan:   1. Overall the patient has improved. 2. [place] Activity. 4. Supp kcl.   Hoda Polk MD   4/20/2022  11:50 AM

## 2022-04-20 NOTE — CARE COORDINATION
CM spoke to pt about PT/OT recommendation for SNF and pt is requesting ARU. CM made referral to ARU.

## 2022-04-20 NOTE — PLAN OF CARE
Problem: Pain:  Goal: Pain level will decrease  Description: Pain level will decrease  4/19/2022 2229 by Baljinder Ann LPN  Outcome: Ongoing  4/19/2022 1540 by Leana Buerger, LPN  Outcome: Ongoing  Goal: Control of acute pain  Description: Control of acute pain  4/19/2022 2229 by Baljinder Ann LPN  Outcome: Ongoing  4/19/2022 1540 by Leana Buerger, LPN  Outcome: Ongoing  Goal: Control of chronic pain  Description: Control of chronic pain  4/19/2022 2229 by Baljinder Ann LPN  Outcome: Ongoing  4/19/2022 1540 by Leana Buerger, LPN  Outcome: Ongoing     Problem: Falls - Risk of:  Goal: Will remain free from falls  Description: Will remain free from falls  4/19/2022 2229 by Baljinder Ann LPN  Outcome: Ongoing  4/19/2022 1540 by Leana Buerger, LPN  Outcome: Ongoing  Goal: Absence of physical injury  Description: Absence of physical injury  4/19/2022 2229 by Baljinder Ann LPN  Outcome: Ongoing  4/19/2022 1540 by Leana Buerger, LPN  Outcome: Ongoing     Problem: Diarrhea:  Goal: Bowel elimination is within specified parameters  Description: Bowel elimination is within specified parameters  4/19/2022 2229 by Baljinder Ann LPN  Outcome: Ongoing  4/19/2022 1540 by Leana Buerger, LPN  Outcome: Ongoing  Goal: Passage of soft, formed stool  Description: Passage of soft, formed stool  4/19/2022 2229 by Baljinder Ann LPN  Outcome: Ongoing  4/19/2022 1540 by Leana Buerger, LPN  Outcome: Ongoing  Goal: Establishment of normal bowel function will improve to within specified parameters  Description: Establishment of normal bowel function will improve to within specified parameters  4/19/2022 2229 by Baljinder Ann LPN  Outcome: Ongoing  4/19/2022 1540 by Leana Buerger, LPN  Outcome: Ongoing     Problem: OXYGENATION/RESPIRATORY FUNCTION  Goal: Patient will maintain patent airway  4/19/2022 2229 by Baljinder Ann LPN  Outcome: Ongoing  4/19/2022 1540 by Leana Buerger, LPN  Outcome: Ongoing  Goal: Patient will achieve/maintain normal respiratory rate/effort  Description: Respiratory rate and effort will be within normal limits for the patient  4/19/2022 2229 by Antelmo Kenney LPN  Outcome: Ongoing  4/19/2022 1540 by Rachel Medina LPN  Outcome: Ongoing     Problem: MECHANICAL VENTILATION  Goal: Patient will maintain patent airway  4/19/2022 2229 by Antelmo Kenney LPN  Outcome: Ongoing  4/19/2022 1540 by Rachel Medina LPN  Outcome: Ongoing  Goal: Oral health is maintained or improved  4/19/2022 2229 by Antelmo Kenney LPN  Outcome: Ongoing  4/19/2022 1540 by Rachel Medina LPN  Outcome: Ongoing  Goal: Tracheostomy will be managed safely  4/19/2022 2229 by Antelmo Kenney LPN  Outcome: Ongoing  4/19/2022 1540 by Rachel Medina LPN  Outcome: Ongoing  Goal: ET tube will be managed safely  4/19/2022 2229 by Antelmo Kenney LPN  Outcome: Ongoing  4/19/2022 1540 by Rachel Medina LPN  Outcome: Ongoing  Goal: Ability to express needs and understand communication  4/19/2022 2229 by Antelmo Kenney LPN  Outcome: Ongoing  4/19/2022 1540 by Rachel Medina LPN  Outcome: Ongoing  Goal: Mobility/activity is maintained at optimum level for patient  4/19/2022 2229 by Antelmo Kenney LPN  Outcome: Ongoing  4/19/2022 1540 by Rachel Medina LPN  Outcome: Ongoing     Problem: SKIN INTEGRITY  Goal: Skin integrity is maintained or improved  4/19/2022 2229 by Antelmo Kenney LPN  Outcome: Ongoing  4/19/2022 1540 by Rachel Medina LPN  Outcome: Ongoing     Problem: NUTRITION  Goal: Nutritional status is improving  4/19/2022 2229 by Antelmo Kenney LPN  Outcome: Ongoing  4/19/2022 1540 by Rachel Medina LPN  Outcome: Ongoing

## 2022-04-21 ENCOUNTER — HOSPITAL ENCOUNTER (INPATIENT)
Age: 57
LOS: 12 days | Discharge: HOME HEALTH | DRG: 947 | End: 2022-05-03
Attending: PHYSICAL MEDICINE & REHABILITATION | Admitting: PHYSICAL MEDICINE & REHABILITATION
Payer: MEDICARE

## 2022-04-21 VITALS
HEIGHT: 68 IN | RESPIRATION RATE: 19 BRPM | BODY MASS INDEX: 25.19 KG/M2 | TEMPERATURE: 97.9 F | OXYGEN SATURATION: 95 % | WEIGHT: 166.23 LBS | DIASTOLIC BLOOD PRESSURE: 95 MMHG | HEART RATE: 87 BPM | SYSTOLIC BLOOD PRESSURE: 119 MMHG

## 2022-04-21 PROBLEM — J96.01 ACUTE RESPIRATORY FAILURE WITH HYPOXEMIA (HCC): Status: ACTIVE | Noted: 2022-04-21

## 2022-04-21 LAB
ANION GAP SERPL CALCULATED.3IONS-SCNC: 12 MMOL/L (ref 4–16)
BASOPHILS ABSOLUTE: 0.1 K/CU MM
BASOPHILS RELATIVE PERCENT: 0.6 % (ref 0–1)
BUN BLDV-MCNC: 8 MG/DL (ref 6–23)
CALCIUM SERPL-MCNC: 8.4 MG/DL (ref 8.3–10.6)
CHLORIDE BLD-SCNC: 106 MMOL/L (ref 99–110)
CO2: 23 MMOL/L (ref 21–32)
CREAT SERPL-MCNC: 0.7 MG/DL (ref 0.9–1.3)
DIFFERENTIAL TYPE: ABNORMAL
EOSINOPHILS ABSOLUTE: 1 K/CU MM
EOSINOPHILS RELATIVE PERCENT: 11 % (ref 0–3)
GFR AFRICAN AMERICAN: >60 ML/MIN/1.73M2
GFR NON-AFRICAN AMERICAN: >60 ML/MIN/1.73M2
GLUCOSE BLD-MCNC: 95 MG/DL (ref 70–99)
HCT VFR BLD CALC: 46.7 % (ref 42–52)
HEMOGLOBIN: 15.7 GM/DL (ref 13.5–18)
IMMATURE NEUTROPHIL %: 0.4 % (ref 0–0.43)
LYMPHOCYTES ABSOLUTE: 2.2 K/CU MM
LYMPHOCYTES RELATIVE PERCENT: 25.1 % (ref 24–44)
MCH RBC QN AUTO: 30.5 PG (ref 27–31)
MCHC RBC AUTO-ENTMCNC: 33.6 % (ref 32–36)
MCV RBC AUTO: 90.9 FL (ref 78–100)
MONOCYTES ABSOLUTE: 0.9 K/CU MM
MONOCYTES RELATIVE PERCENT: 10 % (ref 0–4)
NUCLEATED RBC %: 0 %
PDW BLD-RTO: 11.9 % (ref 11.7–14.9)
PLATELET # BLD: 297 K/CU MM (ref 140–440)
PMV BLD AUTO: 9.2 FL (ref 7.5–11.1)
POTASSIUM SERPL-SCNC: 3.6 MMOL/L (ref 3.5–5.1)
RBC # BLD: 5.14 M/CU MM (ref 4.6–6.2)
SARS-COV-2, NAAT: NOT DETECTED
SEGMENTED NEUTROPHILS ABSOLUTE COUNT: 4.7 K/CU MM
SEGMENTED NEUTROPHILS RELATIVE PERCENT: 52.9 % (ref 36–66)
SODIUM BLD-SCNC: 141 MMOL/L (ref 135–145)
SOURCE: NORMAL
TOTAL IMMATURE NEUTOROPHIL: 0.04 K/CU MM
TOTAL NUCLEATED RBC: 0 K/CU MM
WBC # BLD: 8.9 K/CU MM (ref 4–10.5)

## 2022-04-21 PROCEDURE — 94664 DEMO&/EVAL PT USE INHALER: CPT

## 2022-04-21 PROCEDURE — 85025 COMPLETE CBC W/AUTO DIFF WBC: CPT

## 2022-04-21 PROCEDURE — 6370000000 HC RX 637 (ALT 250 FOR IP): Performed by: INTERNAL MEDICINE

## 2022-04-21 PROCEDURE — 80048 BASIC METABOLIC PNL TOTAL CA: CPT

## 2022-04-21 PROCEDURE — 99223 1ST HOSP IP/OBS HIGH 75: CPT | Performed by: PHYSICAL MEDICINE & REHABILITATION

## 2022-04-21 PROCEDURE — 2700000000 HC OXYGEN THERAPY PER DAY

## 2022-04-21 PROCEDURE — 36415 COLL VENOUS BLD VENIPUNCTURE: CPT

## 2022-04-21 PROCEDURE — 2580000003 HC RX 258: Performed by: INTERNAL MEDICINE

## 2022-04-21 PROCEDURE — 94640 AIRWAY INHALATION TREATMENT: CPT

## 2022-04-21 PROCEDURE — 87635 SARS-COV-2 COVID-19 AMP PRB: CPT

## 2022-04-21 PROCEDURE — 6370000000 HC RX 637 (ALT 250 FOR IP): Performed by: PHYSICAL MEDICINE & REHABILITATION

## 2022-04-21 PROCEDURE — 1280000000 HC REHAB R&B

## 2022-04-21 PROCEDURE — 94761 N-INVAS EAR/PLS OXIMETRY MLT: CPT

## 2022-04-21 PROCEDURE — 6360000002 HC RX W HCPCS: Performed by: INTERNAL MEDICINE

## 2022-04-21 RX ORDER — LEVETIRACETAM 500 MG/1
500 TABLET ORAL 2 TIMES DAILY
Status: CANCELLED | OUTPATIENT
Start: 2022-04-21

## 2022-04-21 RX ORDER — ACETAMINOPHEN 325 MG/1
650 TABLET ORAL EVERY 4 HOURS PRN
Status: DISCONTINUED | OUTPATIENT
Start: 2022-04-21 | End: 2022-05-03 | Stop reason: HOSPADM

## 2022-04-21 RX ORDER — PAROXETINE HYDROCHLORIDE 20 MG/1
40 TABLET, FILM COATED ORAL EVERY MORNING
Status: DISCONTINUED | OUTPATIENT
Start: 2022-04-22 | End: 2022-05-03 | Stop reason: HOSPADM

## 2022-04-21 RX ORDER — AMLODIPINE BESYLATE 10 MG/1
10 TABLET ORAL EVERY MORNING
Status: DISCONTINUED | OUTPATIENT
Start: 2022-04-22 | End: 2022-05-03 | Stop reason: HOSPADM

## 2022-04-21 RX ORDER — LEVOTHYROXINE SODIUM 0.05 MG/1
25 TABLET ORAL DAILY
Status: DISCONTINUED | OUTPATIENT
Start: 2022-04-22 | End: 2022-05-03 | Stop reason: HOSPADM

## 2022-04-21 RX ORDER — ALBUTEROL SULFATE 90 UG/1
2 AEROSOL, METERED RESPIRATORY (INHALATION)
Status: DISCONTINUED | OUTPATIENT
Start: 2022-04-21 | End: 2022-04-21

## 2022-04-21 RX ORDER — ALBUTEROL SULFATE 90 UG/1
2 AEROSOL, METERED RESPIRATORY (INHALATION)
Status: CANCELLED | OUTPATIENT
Start: 2022-04-21

## 2022-04-21 RX ORDER — ACETAMINOPHEN 325 MG/1
650 TABLET ORAL EVERY 6 HOURS PRN
Status: CANCELLED | OUTPATIENT
Start: 2022-04-21

## 2022-04-21 RX ORDER — ACETAMINOPHEN 650 MG/1
650 SUPPOSITORY RECTAL EVERY 6 HOURS PRN
Status: DISCONTINUED | OUTPATIENT
Start: 2022-04-21 | End: 2022-04-21

## 2022-04-21 RX ORDER — LEVOTHYROXINE SODIUM 0.03 MG/1
25 TABLET ORAL DAILY
Status: CANCELLED | OUTPATIENT
Start: 2022-04-22

## 2022-04-21 RX ORDER — ALBUTEROL SULFATE 90 UG/1
2 AEROSOL, METERED RESPIRATORY (INHALATION) EVERY 4 HOURS PRN
Status: DISCONTINUED | OUTPATIENT
Start: 2022-04-21 | End: 2022-05-03 | Stop reason: HOSPADM

## 2022-04-21 RX ORDER — AMLODIPINE BESYLATE 10 MG/1
10 TABLET ORAL EVERY MORNING
Status: CANCELLED | OUTPATIENT
Start: 2022-04-21

## 2022-04-21 RX ORDER — PANTOPRAZOLE SODIUM 40 MG/1
40 TABLET, DELAYED RELEASE ORAL
Status: DISCONTINUED | OUTPATIENT
Start: 2022-04-21 | End: 2022-04-21

## 2022-04-21 RX ORDER — LEVETIRACETAM 500 MG/1
500 TABLET ORAL 2 TIMES DAILY
Status: DISCONTINUED | OUTPATIENT
Start: 2022-04-21 | End: 2022-04-21

## 2022-04-21 RX ORDER — IPRATROPIUM BROMIDE AND ALBUTEROL SULFATE 2.5; .5 MG/3ML; MG/3ML
1 SOLUTION RESPIRATORY (INHALATION)
Status: DISCONTINUED | OUTPATIENT
Start: 2022-04-21 | End: 2022-04-28

## 2022-04-21 RX ORDER — IPRATROPIUM BROMIDE AND ALBUTEROL SULFATE 2.5; .5 MG/3ML; MG/3ML
1 SOLUTION RESPIRATORY (INHALATION)
Status: CANCELLED | OUTPATIENT
Start: 2022-04-21

## 2022-04-21 RX ORDER — TOBRAMYCIN 3 MG/ML
1 SOLUTION/ DROPS OPHTHALMIC
Status: DISCONTINUED | OUTPATIENT
Start: 2022-04-21 | End: 2022-04-21

## 2022-04-21 RX ORDER — PANTOPRAZOLE SODIUM 40 MG/1
40 TABLET, DELAYED RELEASE ORAL
Status: CANCELLED | OUTPATIENT
Start: 2022-04-21

## 2022-04-21 RX ORDER — TOBRAMYCIN 3 MG/ML
1 SOLUTION/ DROPS OPHTHALMIC
Status: CANCELLED | OUTPATIENT
Start: 2022-04-21

## 2022-04-21 RX ORDER — ENOXAPARIN SODIUM 100 MG/ML
40 INJECTION SUBCUTANEOUS DAILY
Status: CANCELLED | OUTPATIENT
Start: 2022-04-21

## 2022-04-21 RX ORDER — GUAIFENESIN 600 MG/1
600 TABLET, EXTENDED RELEASE ORAL 2 TIMES DAILY
Status: CANCELLED | OUTPATIENT
Start: 2022-04-21

## 2022-04-21 RX ORDER — LEVETIRACETAM 750 MG/1
750 TABLET ORAL 2 TIMES DAILY
COMMUNITY

## 2022-04-21 RX ORDER — ALBUTEROL SULFATE 90 UG/1
2 AEROSOL, METERED RESPIRATORY (INHALATION) 4 TIMES DAILY
Status: DISCONTINUED | OUTPATIENT
Start: 2022-04-21 | End: 2022-04-21

## 2022-04-21 RX ORDER — PAROXETINE HYDROCHLORIDE 20 MG/1
40 TABLET, FILM COATED ORAL EVERY MORNING
Status: CANCELLED | OUTPATIENT
Start: 2022-04-21

## 2022-04-21 RX ORDER — ONDANSETRON 4 MG/1
4 TABLET, ORALLY DISINTEGRATING ORAL EVERY 6 HOURS PRN
Status: DISCONTINUED | OUTPATIENT
Start: 2022-04-21 | End: 2022-05-03 | Stop reason: HOSPADM

## 2022-04-21 RX ORDER — ENOXAPARIN SODIUM 100 MG/ML
40 INJECTION SUBCUTANEOUS DAILY
Status: DISCONTINUED | OUTPATIENT
Start: 2022-04-22 | End: 2022-05-02

## 2022-04-21 RX ORDER — OSELTAMIVIR PHOSPHATE 75 MG/1
75 CAPSULE ORAL 2 TIMES DAILY
Status: CANCELLED | OUTPATIENT
Start: 2022-04-21 | End: 2022-04-23

## 2022-04-21 RX ORDER — ACETAMINOPHEN 325 MG/1
650 TABLET ORAL EVERY 6 HOURS PRN
Status: DISCONTINUED | OUTPATIENT
Start: 2022-04-21 | End: 2022-04-22

## 2022-04-21 RX ORDER — POLYETHYLENE GLYCOL 3350 17 G/17G
17 POWDER, FOR SOLUTION ORAL DAILY PRN
Status: DISCONTINUED | OUTPATIENT
Start: 2022-04-21 | End: 2022-05-03 | Stop reason: HOSPADM

## 2022-04-21 RX ORDER — GUAIFENESIN 600 MG/1
600 TABLET, EXTENDED RELEASE ORAL 2 TIMES DAILY
Status: DISCONTINUED | OUTPATIENT
Start: 2022-04-21 | End: 2022-05-03 | Stop reason: HOSPADM

## 2022-04-21 RX ORDER — OSELTAMIVIR PHOSPHATE 75 MG/1
75 CAPSULE ORAL 2 TIMES DAILY
Status: COMPLETED | OUTPATIENT
Start: 2022-04-21 | End: 2022-04-22

## 2022-04-21 RX ORDER — ACETAMINOPHEN 650 MG/1
650 SUPPOSITORY RECTAL EVERY 6 HOURS PRN
Status: CANCELLED | OUTPATIENT
Start: 2022-04-21

## 2022-04-21 RX ADMIN — OSELTAMIVIR PHOSPHATE 75 MG: 75 CAPSULE ORAL at 20:11

## 2022-04-21 RX ADMIN — TOBRAMYCIN OPHTHALMIC SOLUTION 1 DROP: 3 SOLUTION/ DROPS OPHTHALMIC at 16:00

## 2022-04-21 RX ADMIN — GUAIFENESIN 600 MG: 600 TABLET, EXTENDED RELEASE ORAL at 08:51

## 2022-04-21 RX ADMIN — OSELTAMIVIR PHOSPHATE 75 MG: 75 CAPSULE ORAL at 08:51

## 2022-04-21 RX ADMIN — IPRATROPIUM BROMIDE AND ALBUTEROL SULFATE 1 AMPULE: .5; 2.5 SOLUTION RESPIRATORY (INHALATION) at 19:24

## 2022-04-21 RX ADMIN — TOBRAMYCIN OPHTHALMIC SOLUTION 1 DROP: 3 SOLUTION/ DROPS OPHTHALMIC at 04:06

## 2022-04-21 RX ADMIN — PANTOPRAZOLE SODIUM 40 MG: 40 TABLET, DELAYED RELEASE ORAL at 16:00

## 2022-04-21 RX ADMIN — LEVETIRACETAM 500 MG: 500 TABLET, FILM COATED ORAL at 08:51

## 2022-04-21 RX ADMIN — ONDANSETRON 4 MG: 4 TABLET, ORALLY DISINTEGRATING ORAL at 18:32

## 2022-04-21 RX ADMIN — LEVETIRACETAM 750 MG: 500 TABLET, FILM COATED ORAL at 20:11

## 2022-04-21 RX ADMIN — ENOXAPARIN SODIUM 40 MG: 100 INJECTION SUBCUTANEOUS at 08:51

## 2022-04-21 RX ADMIN — ACETAMINOPHEN 650 MG: 325 TABLET ORAL at 20:11

## 2022-04-21 RX ADMIN — AMLODIPINE BESYLATE 10 MG: 10 TABLET ORAL at 08:51

## 2022-04-21 RX ADMIN — SODIUM CHLORIDE, PRESERVATIVE FREE 10 ML: 5 INJECTION INTRAVENOUS at 08:52

## 2022-04-21 RX ADMIN — GUAIFENESIN 600 MG: 600 TABLET, EXTENDED RELEASE ORAL at 20:11

## 2022-04-21 RX ADMIN — TOBRAMYCIN OPHTHALMIC SOLUTION 1 DROP: 3 SOLUTION/ DROPS OPHTHALMIC at 08:52

## 2022-04-21 RX ADMIN — AZITHROMYCIN DIHYDRATE 500 MG: 500 INJECTION, POWDER, LYOPHILIZED, FOR SOLUTION INTRAVENOUS at 06:56

## 2022-04-21 RX ADMIN — IPRATROPIUM BROMIDE AND ALBUTEROL SULFATE 1 AMPULE: .5; 2.5 SOLUTION RESPIRATORY (INHALATION) at 16:04

## 2022-04-21 RX ADMIN — TOBRAMYCIN OPHTHALMIC SOLUTION 1 DROP: 3 SOLUTION/ DROPS OPHTHALMIC at 13:00

## 2022-04-21 RX ADMIN — PANTOPRAZOLE SODIUM 40 MG: 40 TABLET, DELAYED RELEASE ORAL at 06:51

## 2022-04-21 RX ADMIN — IPRATROPIUM BROMIDE AND ALBUTEROL SULFATE 1 AMPULE: .5; 2.5 SOLUTION RESPIRATORY (INHALATION) at 11:15

## 2022-04-21 RX ADMIN — IPRATROPIUM BROMIDE AND ALBUTEROL SULFATE 1 AMPULE: .5; 2.5 SOLUTION RESPIRATORY (INHALATION) at 07:16

## 2022-04-21 RX ADMIN — PAROXETINE HYDROCHLORIDE 40 MG: 20 TABLET, FILM COATED ORAL at 08:51

## 2022-04-21 RX ADMIN — LEVOTHYROXINE SODIUM 25 MCG: 25 TABLET ORAL at 06:51

## 2022-04-21 ASSESSMENT — PAIN SCALES - GENERAL
PAINLEVEL_OUTOF10: 2
PAINLEVEL_OUTOF10: 0
PAINLEVEL_OUTOF10: 0

## 2022-04-21 ASSESSMENT — PAIN DESCRIPTION - LOCATION: LOCATION: BACK

## 2022-04-21 ASSESSMENT — PAIN - FUNCTIONAL ASSESSMENT: PAIN_FUNCTIONAL_ASSESSMENT: ACTIVITIES ARE NOT PREVENTED

## 2022-04-21 ASSESSMENT — PAIN DESCRIPTION - DESCRIPTORS: DESCRIPTORS: DULL

## 2022-04-21 NOTE — PROGRESS NOTES
4 Eyes Skin Assessment     NAME:  Remedios Fisher  YOB: 1965  MEDICAL RECORD NUMBER:  [id number]    The patient is being assess for  Admission    I agree that 2 RN's have performed a thorough Head to Toe Skin Assessment on the patient. ALL assessment sites listed below have been assessed. Areas assessed by both nurses:    Head, Face, Ears, Shoulders, Back, Chest, Arms, Elbows, Hands, Sacrum. Buttock, Coccyx, Ischium and Legs. Feet and Heels        Does the Patient have a Wound?  No noted wound(s)       Antelmo Prevention initiated:  No   Wound Care Orders initiated:  No    Pressure Injury (Stage 3,4, Unstageable, DTI, NWPT, and Complex wounds) if present place consult order under [de-identified] No    New and Established Ostomies if present place consult order under : No      Nurse 1 eSignature: Electronically signed by Teri Jolly RN on 4/21/22 at 2:23 PM EDT    **SHARE this note so that the co-signing nurse is able to place an eSignature**    Nurse 2 eSignature: Electronically signed by Ami Mendiola LPN on 4/24/24 at 1:86 PM EDT

## 2022-04-21 NOTE — PLAN OF CARE
ARU Interdisciplinary Plan of Care (IPOC)  [facility] Dr. Naren Neumann [facility], [employer]  [phone]  Fax: [phone]        Roger Cates    : 1965  Acct #: [de-identified]  MRN: [id number]   PHYSICIAN:  Chucky Yao MD  Primary Active Problems:   Active Hospital Problems    Diagnosis Date Noted    Influenza A [J10.1]      Priority: Medium    Generalized weakness [R53.1]      Priority: Medium    Essential hypertension [I10]      Priority: Medium    Spastic quadriplegic cerebral palsy ([facility] [address] [G80.0]      Priority: Medium    Acute respiratory failure with hypoxemia ([facility] [address] [J96.01] 2022     Priority: Medium       Rehabilitation Diagnosis:     Acute respiratory failure with hypoxia [J96.01]  Acute respiratory failure with hypoxemia [facility]) [J96.01]          CARE PLAN     NURSING:  Billyradhaallen Darryn while on this unit will:      Bowel and Bladder   [x] Be continent of bowel and bladder      [x] Have an adequate number of bowel movements   [] Urinate with no urinary retention >300ml in bladder   [] Bladder Scan: (details)   [] Complete bladder protocol with sarah removal   [] Initiate Bladder Program to toilet every ___ hours   [] Initiate Bowel Program to toilet every ___hours   [] Bladder training    [] Bowel training  Pulmonary   [x] Maintain O2 SATs at 92% or greater  Pain Management   [x] Have pain managed while on ARU        [] Be pain free by discharge    [] Medication Management and Education  Maintenance of Skin Integrity/Wound Management   [x] Have no skin breakdown while on ARU   [] Have improved skin integrity via wound measurements   [] Have no signs/symptoms of infection via infection protection and monitoring at the          wound site  Fall Prevention   [x] Be free from injury during hospitalization via fall prevention measures     [] Disease management and Education  Precautions   [] Weight Bearing Precautions [] Swallowing Precautions   [] Monitoring of Risks of Complications   [x] DVT Prophylaxis    [] Fluid/electrolyte/Nutrition Management    [] Complete education with patient/family with understanding demonstrated for          in-room safety with transfers to bed, toilet, wheelchair, shower as well as                bathroom activities and hygiene. [] Adjustment   [] Other:   Nursing interventions may include bowel/bladder training, education for medical assistive devices, medication education, O2 saturation management, energy conservation, stress management techniques, fall prevention, alarms protocol, seating and positioning, skin/wound care, pressure relief instruction,dressing changes,  infection protection, DVT prophylaxis, and/or assistance with in room safety with transfers to bed, toilet, wheelchair, shower as well as bathroom activities and hygiene. Patient/caregiver education for:   [x] Disease/sustained injury/management      [x] Medication Use   [] Surgical intervention   [x] Safety/Precautions   [] Body mechanics and or joint protection   [x] Health maintenance         PHYSICAL THERAPY:  Goals:                               Long Term Goals  Time Frame for Long term goals : 10 tx days:  Long term goal 1: Pt will complete bed mobility (scooting, rolling R/L, and sup<->sit) Ind.  Long term goal 2: Pt will complete OOB transfers Mod Ind-Ind. Long term goal 3: Pt will ambulate 150 ft over level surface using LRAD prn Mod Ind-Ind. Long term goal 4: Pt will ambulate at least 10 ft of uneven surface and ascend/descend curb step using LRAD with SBA-Sup. Long term goal 5: Pt will ascend/descend 1 flight of stairs using railings with SBA. Long term goal 6: Pt will complete object retrieval from the floor using reacher Mod Ind. These goals were reviewed with this patient at the time of assessment and Carlos Malhotra is in agreement.      Plan of Care: Pt to be seen 5 days per week for a minimum of 60 minutes for 10 days. Current Treatment Recommendations: Strengthening,Endurance training,Safety education & training,Balance training,Functional mobility training,Transfer training,Gait training,Stair training,Return to work related activity,Home exercise program,Patient/Caregiver education & training,Equipment evaluation, education, & procurement,Therapeutic activities community reintegration,animal assisted therapy, and concurrent/group therapy.     PT IRF-BENNETT scores and goals for initial assessment:   Bed Mobility:   Sit to Lying  Assistance Needed: Supervision or touching assistance  Comment: SBA without use of bed features with additional assist on O2 line management  CARE Score: 4  Discharge Goal: Independent  Roll Left and Right  Assistance Needed: Supervision or touching assistance  Comment: SBA-Sup; completed rolling to R without use of bed features; pt only able to complete partial rolling to L without use of bed features and eventually required use of bed rail to complete full rolling to L  CARE Score: 4  Discharge Goal: Independent  Lying to Sitting on Side of Bed  Assistance Needed: Supervision or touching assistance  Comment: SBA without use of bed features  CARE Score: 4  Discharge Goal: Independent    Transfers:    Sit to Stand  Assistance Needed: Supervision or touching assistance  Comment: CGA without AD  CARE Score: 4  Discharge Goal: Independent  Chair/Bed-to-Chair Transfer  Assistance Needed: Supervision or touching assistance  Comment: CGA without AD with additional assist on O2 line management  CARE Score: 4  Discharge Goal: Independent     Car Transfer  Assistance Needed: Supervision or touching assistance  Comment: CGA without AD with additional assist on O2 line management  CARE Score: 4  Discharge Goal: Independent    Ambulation:    Walking Ability  Does the Patient Walk?: Yes     Walk 10 Feet  Assistance Needed: Dependent  Comment: required 2-person assist for safety (Min A without AD + SBA of 2nd person for close w/c follow and O2 line/tank management); 2nd person was necessary due to pt's unsteady gait quality and unpredictable strength, balance, and endurance  CARE Score: 1  Discharge Goal: Independent     Walk 50 Feet with Two Turns  Comment: max tolerance today was 37 ft without AD with 2-person assist (Min A + SBA of 2nd person for close w/c follow and O2 line/tank management); ambulation tolerance was limited by fatigue and SOB; SpO2 at 2L O2 was 96% after this maximal exertion  Reason if not Attempted: Not attempted due to medical condition or safety concerns  CARE Score: 88  Discharge Goal: Independent     Walk 150 Feet  Reason if not Attempted: Not attempted due to medical condition or safety concerns  CARE Score: 88  Discharge Goal: Independent     Walking 10 Feet on Uneven Surfaces  Assistance Needed: Dependent  Comment: required 2-person assist for safety (Min A without AD + SBA of 2nd person for close w/c follow and O2 line/tank management)  CARE Score: 1  Discharge Goal: Supervision or touching assistance     1 Step (Curb)  Assistance Needed: Dependent  Comment: Min A x 2 without AD (required HHA to be able to shift weight with RLE leading on ascent and LLE leading on descent of 4\" step height); pt required additional assist on O2 line/tank management during this mobility task  CARE Score: 1  Discharge Goal: Supervision or touching assistance     4 Steps  Assistance Needed: Dependent  Comment: pt was not performing this at baseline due to fear of falling, however was able to complete with 2-person assist today for safety (Min A + SBA of 2nd person using railings (pt was able to functionally use RUE during this mobility task); required additional assist on O2 line management; pt completed task with step-to pattern with RLE leading consistently on ascent and LLE leading consistently on descent  Discharge Goal: Supervision or touching assistance     12 Steps  Comment: max tolerance today was 10 steps using railings with 2-person assist for safety activity tolerance was limited by fatigue; pt completed task with step-to pattern with RLE leading consistently on ascent and LLE leading consistently on descent  Reason if not Attempted: Not attempted due to medical condition or safety concerns  CARE Score: 88  Discharge Goal: Supervision or touching assistance    Gait Deviations: []None []Slow guera  [] Increased GILBERTO  [] Staggers []Deviated Path  [] Decreased step length  [] Decreased step height  []Decreased arm swing  [] Shuffles  [] Decreased head and trunk rotation  []other:        Wheelchair:  w/c Ability: Wheelchair Ability  Uses a Wheelchair and/or Scooter?: No                Balance:        Object: Picking Up Object  Assistance Needed: Partial/moderate assistance  Comment: Min A without use of reacher  CARE Score: 3  Discharge Goal: Independent  OCCUPATIONAL THERAPY:  Goals:             Short Term Goals  Time Frame for Short term goals: STGs=LTGs :  Long Term Goals  Time Frame for Long term goals : 7-10 days or until d/c. Long Term Goal 1: Pt will complete self-feeding c Ind. Long Term Goal 2: Pt will complete grooming tasks c Mod I.  Long Term Goal 3: Pt will complete total body bathing c Mod I.  Long Term Goal 4: Pt will complete UB dressing c Ind. Long Term Goal 5: Pt will complete LB dressing c Mod I. Additional Goals?: Yes  Long Term Goal 6: Pt will doff/don footwear c Mod I.  Long Term Goal 7: Pt will complete toileting c Mod I.  Long Term Goal 8: Pt will perform functional transfers (bed, chair, toilet, shower) c DME PRN and Mod I.  Long Term Goal 9: Pt will perform therex/therax to facilitate increased strength/endurance/ax tolerance (c emphasis on dynamic standing balance/tolerance > 8 mins) c Mod I.  Long Term Goal 10:  Pt will complete home management tasks c Mod I. :    These goals were reviewed with this patient at the time of assessment and Sigifredo Garvin Oval Asters is in agreement    Plan of Care:  Pt to be seen 5 days per week for a minimum of 60 minutes for 10 days. Plan  Times per Day: Daily  Current Treatment Recommendations: Strengthening,Balance training,Functional mobility training,Endurance training,Neuromuscular re-education,Safety education & training,Patient/Caregiver education & training,Equipment evaluation, education, & procurement,Self-Care / ADL,Home management training         cognitive training, home management, energy conservation training, community reintegration, splint fabrication, patient/caregiver education and training, animal assisted therapy, and concurrent and/or group therapy. OT IRF-BENNETT scores and goals for initial assessment:    ADLs:    Eating: Eating  Assistance Needed: Setup or clean-up assistance  Comment: setup assist to eat breakfast as Pt needed assistance opening container. CARE Score: 5  Discharge Goal: Independent       Oral Hygiene: Oral Hygiene  Assistance Needed: Independent  Comment: Mod I seated at sink. CARE Score: 6  Discharge Goal: Independent    UB/LB Bathing: Shower/Bathe Self  Assistance Needed: Partial/moderate assistance  Comment: Pt required assist for thorough rear washing. CARE Score: 3  Discharge Goal: Independent    UB Dressing: Upper Body Dressing  Assistance Needed: Supervision or touching assistance  Comment: SBA to don pullover tshirt. CARE Score: 4  Discharge Goal: Independent         LB Dressing: Lower Body Dressing  Assistance Needed: Supervision or touching assistance  Comment: CGA in stance to pull down/pull up depends and pants. CARE Score: 4  Discharge Goal: Independent    Donning and [place] Footwear: Putting On/Taking Off Footwear  Assistance Needed: Supervision or touching assistance  Comment: CGA seated in W/C to don socks and shoes. CARE Score: 4  Discharge Goal: Independent      Toiletin [address] needed: Supervision or touching assistance  Comment: CGA in stance for perineal hygiene after BM. CARE Score: 4  Discharge Goal: Independent      Toilet Transfers: Toilet Transfer  Assistance needed: Supervision or touching assistance  Comment: CGA utilizing 2WW and grab bars. CARE Score: 4  Discharge Goal: Independent      SPEECH THERAPY: (If ordered)  Plan of Care and Goals:   LTG                                                            LTG:                           Treatments may include speech/language/communication therapy, cognitive training, animal assisted therapy, group therapy, education, and/or dysphagia therapy based on the above goals. Co-treats where appropriate with PT or OT to facilitate patient goals in functional tasks. These goals were reviewed with this patient at the time of assessment and Bernard Cisneros is in agreement. CASE MANAGEMENT:  Goals:   Assist patient/family with discharge planning, patient/family counseling, assistance in obtaining recommended equipment and other services, and coordination with insurance during ARU stay. Patient Goals:   Return to maximum level of independent function. Nutrition goal: Patient will tolerate diet to consume at least 75% at meals during stay    Activities Prior to Admit:   Homemaking Responsibilities: Yes  Active : No  Mode of Transportation: [employer]  Occupation: Part time employment  Leisure & Hobbies: Pt enjoys watching TV/sports and playing cards. Intensity of Therapy  Bernard Cisneros will be seen a minimum of 3 hours of therapy per day/a minimum of 5 out of 7 days per week. [] In this rare instance due to the nature of this patient's medical involvement, this patient will be seen 15 hours per week (900 minutes within a 7 day period).     Treatments may include therapeutic exercises, gait training, neuromuscular re-ed, transfer training, community reintegration, bed mobility, w/c mobility and training, self care, home mgmt, cognitive training, energy conservation,dysphagia tx, speech/language/communication therapy, group therapy, and patient/family education. In addition, dietician/nutritionist may monitor calorie count as well as intake and collaboratively work with SLP on dietary upgrades. Neuropsychology/Psychology may evaluate and provide necessary support. Group therapy as appropriate to facilitate improved endurance, STR, COORD, function, safety, transfers, awareness and insight into deficits, problem solving, memory, and social interaction and engagement. Medical issues being managed closely and that require 24 hour availability of a physician:   [x] Swallowing Precautions                                     [] Weight bearing precautions   [] Wound Care                             [x] Infection Prevention   [x] DVT Prophylaxis/assessment              [x] Monitoring for complications    [x] Fall Precautions/Prevention                         [x] Fluid/Electrolyte/Nutrition Balance   [x] Voice Protection                           [x] Medication Management   [x] Respiratory                   [x] Pain Mgmt   [x] Bowel/Bladder Fx    Medical Prognosis: [] Good  [x] Fair    [] Guarded   Total expected IRF days 14                                            Physician anticipated functional outcomes:  FWW and HHC OT/PT and supervision.   Rehab Goals:   [] Return to premorbid function of_______________________________.    [] Independent   [] Mod I  [x] Supervision  [] CGA   [] Min A   [] Mod A  Level for ambulation []without assistive device  [x] with assistive device        [] Independent   [] Mod I  [x] Supervision [] CGA   [] Min A   [] Mod A  Level for transfers []without assistive device  [x] with assistive device         [] Independent   [] Mod I  [x] Supervision [] CGA   [] Min A   [] Mod A Level with ADL's []without assistive device   [x] with assistive device     ___________________     Level with cognitive skills requiring [] No assist [x] Supervision [] Active Assist/Cues     [] Maximize level of mobility and ADL's to decrease burden on caregiver    IPOC brief synthesis of Preadmission Screen, Post-Admission Evaluation, and Therapy Evaluations: Acute inpatient rehabilitation with occupational and physical therapy 180 minutes 5 out of every 7 days. Will address basic and  advancing mobility with self-care instruction and adaptive equipment training. Caregiver education will be offered. Expected length of stay  prior to a supervised level of function for discharge home with a walker and HHC OT/PT is 10 to 12 days.     Additional recommendation:     1. Acute respite failure with gait dysfunction patient requires daily occupational and physical therapy. He was provide aggressive pulmonary hygiene measures, supplement his oxygen to keep saturations greater than 90% and continue his Tamiflu to complete a full course. He is on Mucinex, scheduled duo nebs and cough medicine. He needs adaptive equipment training, possibly caregiver education and bowel and bladder monitoring. DVT prophylaxis. 2. DVT prophylaxis: Lovenox 40 mg subcu daily. I must monitor his hemoglobin and platelet count periodically while on this medication. Weightbearing activities will be pursued daily. GI prophylaxis provided. 3. Seizure disorder: Continuing his baseline AED Keppra 750 mg twice daily. Avoiding exhaustion. Regulating sleep-wake schedule. 4. Hypertension: Norvasc to manage his blood pressure. Target systolic blood pressure is 120-140. Vital signs are checked at rest and with activity. 5. Depression with anxiety: 40 mg daily. Regulating his sleep-wake cycle. Involvement of his group home staff when possible.        Anticipated discharge destination:    [] Home Independently   [x] Home with supervision    []SNF     [] Other       This plan has been reviewed with me in a language I understand.  I have had the opportunity to include my input with my therapy team.    ________________________________________________   ______________________  Patient/Significant Other      Date    I have reviewed this initial plan of care and agree with its contents:    Title   Name    Date    Time    Physician: Adelso Joseph MD 4/23/2022 8:35 AM    Case Mgmt:  Narinder Erazo 4/22/2022 4919    OT: DAYSI Wagner, OTR/L 04/22/22 1304    PT: Otto Harrison, PT    04/22/2022   12:51    RN: Javon Oconnell RN 4/218/22    ST:    Dietician: MARIELY Dyer  04/22/2022  16:38      ADMIT DATE:4/21/2022

## 2022-04-21 NOTE — CARE COORDINATION
Met with patient  and discussed ARU. Explained to patient the required 3 hours of therapy a day. Also explained the average length of stay is 11 days, could be longer or shorter depending on recommendations of therapy and Dr. Dayanara Be. Patient expresses his understanding and states he's agreeable to admit to ARU. Per patient his goal is to return home and eventually go back to work. Spoke with  [facility] () and asked for clothes to be brought in for patient. Per [facility] she will drop them off sometime today. Patient meets criteria and is approved to come to ARU. Patient able to admit once medically stable and after ARU Medical Director and  sign the pre-admission screen (PAS), pending rapid COVID. Notified Dr. Judy Briggs and Sonya Mortensen of approval and the need of a rapid COVID.

## 2022-04-21 NOTE — PROGRESS NOTES
Pulmonary and Critical Care  Progress Note    Subjective: The patient is comfortable in bed. Shortness of breath better. Chest pain none. Addressing respiratory complaints Patient is negative for  hemoptysis and cyanosis. CONSTITUTIONAL:  negative for fevers and chills. Past Medical History:     has a past medical history of Cerebral palsy ([address] Depression, Hypertension, and Seizures ([address] has a past surgical history that includes Eye surgery; Abdomen surgery; and Upper gastrointestinal endoscopy (N/A, 12/3/2019). reports that he has never smoked. He has never used smokeless tobacco. He reports that he does not drink alcohol and does not use drugs. Family history:  family history is not on file. Allergies   Allergen Reactions    Pcn [Penicillins]      Social History:    Reviewed; no changes    Objective:   PHYSICAL EXAM:        VITALS:  BP (!) 119/95   Pulse 87   Temp 97.9 °F (36.6 °C) (Oral)   Resp 17   Ht 5' 8\" (1.727 m)   Wt 166 lb 3.6 oz (75.4 kg)   SpO2 92%   BMI 25.27 kg/m²     24HR INTAKE/OUTPUT:    No intake or output data in the 24 hours ending 04/21/22 1104    CONSTITUTIONAL:  Awake. LUNGS:  decreased breath sounds, occ basilar crackles. CARDIOVASCULAR:  normal S1 and S2 and negative JVD  ABD:Abdomen soft, non-tender. BS normal. No masses,  No organomegaly  NEURO:Alert and oriented x3. Gait normal. Reflexes and motor strength normal and symmetric. Cranial nerves 2-12 and sensation grossly intact.   DATA:    CBC:  Recent Labs     04/19/22  0716 04/20/22  0956 04/21/22  0919   WBC 6.2 8.6 8.9   RBC 4.88 5.15 5.14   HGB 15.0 16.1 15.7   HCT 44.8 47.2 46.7    242 297   MCV 91.8 91.7 90.9   MCH 30.7 31.3* 30.5   MCHC 33.5 34.1 33.6   RDW 11.9 11.9 11.9   SEGSPCT 48.1 54.6 52.9      BMP:  Recent Labs     04/19/22  0716 04/19/22  0716 04/20/22  0956 04/20/22  1308 04/21/22  0919     --  139  --  141   K 3.0*   < > 3.3* 3.2* 3.6     --  104  --  106   CO2 23 -- 22 --  23   BUN 10  --  8  --  8   CREATININE 0.7*  --  0.7*  --  0.7*   CALCIUM 7.8*  --  8.1*  --  8.4   GLUCOSE 117*  --  101*  --  95    < > = values in this interval not displayed. ABG:  No results for input(s): PH, PO2ART, CCJ4QKV, HCO3, BEART, O2SAT in the last 72 hours. Lab Results   Component Value Date    PROBNP 25.54 04/19/2022    PROBNP 26.35 04/17/2022    PROBNP 35.21 06/08/2021     No results found for: [facility]    Radiology Review:  Pertinent images / reports were reviewed as a part of this visit. Assessment:     Patient Active Problem List   Diagnosis    Pneumonia    Cerebral palsy, hemiplegic (HCC)    Hypoxia    [facility] disorder [facility] [address]    Gait disturbance    Pneumonia of both lungs due to infectious organism    Acute upper GI bleed    Cecal polyp    Sebaceous cyst       Plan:   1. Overall the patient has improved. 2. [address] Activity.   Zee Grande MD   4/21/2022  11:04 AM

## 2022-04-22 LAB
ANION GAP SERPL CALCULATED.3IONS-SCNC: 11 MMOL/L (ref 4–16)
BASOPHILS ABSOLUTE: 0 K/CU MM
BASOPHILS RELATIVE PERCENT: 0.3 % (ref 0–1)
BUN BLDV-MCNC: 12 MG/DL (ref 6–23)
CALCIUM SERPL-MCNC: 8 MG/DL (ref 8.3–10.6)
CHLORIDE BLD-SCNC: 109 MMOL/L (ref 99–110)
CO2: 25 MMOL/L (ref 21–32)
CREAT SERPL-MCNC: 0.8 MG/DL (ref 0.9–1.3)
DIFFERENTIAL TYPE: ABNORMAL
EOSINOPHILS ABSOLUTE: 1 K/CU MM
EOSINOPHILS RELATIVE PERCENT: 11.2 % (ref 0–3)
GFR AFRICAN AMERICAN: >60 ML/MIN/1.73M2
GFR NON-AFRICAN AMERICAN: >60 ML/MIN/1.73M2
GLUCOSE BLD-MCNC: 101 MG/DL (ref 70–99)
HCT VFR BLD CALC: 44.5 % (ref 42–52)
HEMOGLOBIN: 14.7 GM/DL (ref 13.5–18)
IMMATURE NEUTROPHIL %: 0.2 % (ref 0–0.43)
LYMPHOCYTES ABSOLUTE: 2.6 K/CU MM
LYMPHOCYTES RELATIVE PERCENT: 30.2 % (ref 24–44)
MCH RBC QN AUTO: 30.4 PG (ref 27–31)
MCHC RBC AUTO-ENTMCNC: 33 % (ref 32–36)
MCV RBC AUTO: 91.9 FL (ref 78–100)
MONOCYTES ABSOLUTE: 1 K/CU MM
MONOCYTES RELATIVE PERCENT: 11.8 % (ref 0–4)
NUCLEATED RBC %: 0 %
PDW BLD-RTO: 11.9 % (ref 11.7–14.9)
PLATELET # BLD: 306 K/CU MM (ref 140–440)
PMV BLD AUTO: 9.3 FL (ref 7.5–11.1)
POTASSIUM SERPL-SCNC: 3.7 MMOL/L (ref 3.5–5.1)
RBC # BLD: 4.84 M/CU MM (ref 4.6–6.2)
SEGMENTED NEUTROPHILS ABSOLUTE COUNT: 4 K/CU MM
SEGMENTED NEUTROPHILS RELATIVE PERCENT: 46.3 % (ref 36–66)
SODIUM BLD-SCNC: 145 MMOL/L (ref 135–145)
TOTAL IMMATURE NEUTOROPHIL: 0.02 K/CU MM
TOTAL NUCLEATED RBC: 0 K/CU MM
WBC # BLD: 8.6 K/CU MM (ref 4–10.5)

## 2022-04-22 PROCEDURE — 2700000000 HC OXYGEN THERAPY PER DAY

## 2022-04-22 PROCEDURE — 6370000000 HC RX 637 (ALT 250 FOR IP): Performed by: INTERNAL MEDICINE

## 2022-04-22 PROCEDURE — 94640 AIRWAY INHALATION TREATMENT: CPT

## 2022-04-22 PROCEDURE — 6360000002 HC RX W HCPCS: Performed by: INTERNAL MEDICINE

## 2022-04-22 PROCEDURE — 99232 SBSQ HOSP IP/OBS MODERATE 35: CPT | Performed by: PHYSICAL MEDICINE & REHABILITATION

## 2022-04-22 PROCEDURE — 97535 SELF CARE MNGMENT TRAINING: CPT

## 2022-04-22 PROCEDURE — 85025 COMPLETE CBC W/AUTO DIFF WBC: CPT

## 2022-04-22 PROCEDURE — 6370000000 HC RX 637 (ALT 250 FOR IP): Performed by: PHYSICAL MEDICINE & REHABILITATION

## 2022-04-22 PROCEDURE — 94664 DEMO&/EVAL PT USE INHALER: CPT

## 2022-04-22 PROCEDURE — 36415 COLL VENOUS BLD VENIPUNCTURE: CPT

## 2022-04-22 PROCEDURE — 97162 PT EVAL MOD COMPLEX 30 MIN: CPT

## 2022-04-22 PROCEDURE — 1280000000 HC REHAB R&B

## 2022-04-22 PROCEDURE — 97116 GAIT TRAINING THERAPY: CPT

## 2022-04-22 PROCEDURE — 94761 N-INVAS EAR/PLS OXIMETRY MLT: CPT

## 2022-04-22 PROCEDURE — 80048 BASIC METABOLIC PNL TOTAL CA: CPT

## 2022-04-22 PROCEDURE — 97530 THERAPEUTIC ACTIVITIES: CPT

## 2022-04-22 PROCEDURE — 97166 OT EVAL MOD COMPLEX 45 MIN: CPT

## 2022-04-22 RX ORDER — BENZONATATE 100 MG/1
100 CAPSULE ORAL 3 TIMES DAILY
Status: DISCONTINUED | OUTPATIENT
Start: 2022-04-23 | End: 2022-05-03 | Stop reason: HOSPADM

## 2022-04-22 RX ADMIN — GUAIFENESIN 600 MG: 600 TABLET, EXTENDED RELEASE ORAL at 09:06

## 2022-04-22 RX ADMIN — OSELTAMIVIR PHOSPHATE 75 MG: 75 CAPSULE ORAL at 09:06

## 2022-04-22 RX ADMIN — AMLODIPINE BESYLATE 10 MG: 10 TABLET ORAL at 09:06

## 2022-04-22 RX ADMIN — IPRATROPIUM BROMIDE AND ALBUTEROL SULFATE 1 AMPULE: .5; 2.5 SOLUTION RESPIRATORY (INHALATION) at 09:31

## 2022-04-22 RX ADMIN — ENOXAPARIN SODIUM 40 MG: 100 INJECTION SUBCUTANEOUS at 09:05

## 2022-04-22 RX ADMIN — IPRATROPIUM BROMIDE AND ALBUTEROL SULFATE 1 AMPULE: .5; 2.5 SOLUTION RESPIRATORY (INHALATION) at 16:31

## 2022-04-22 RX ADMIN — GUAIFENESIN 600 MG: 600 TABLET, EXTENDED RELEASE ORAL at 20:05

## 2022-04-22 RX ADMIN — LEVETIRACETAM 750 MG: 500 TABLET, FILM COATED ORAL at 20:04

## 2022-04-22 RX ADMIN — LEVOTHYROXINE SODIUM 25 MCG: 0.05 TABLET ORAL at 06:03

## 2022-04-22 RX ADMIN — IPRATROPIUM BROMIDE AND ALBUTEROL SULFATE 1 AMPULE: .5; 2.5 SOLUTION RESPIRATORY (INHALATION) at 21:04

## 2022-04-22 RX ADMIN — BENZONATATE 100 MG: 100 CAPSULE ORAL at 23:58

## 2022-04-22 RX ADMIN — LEVETIRACETAM 750 MG: 500 TABLET, FILM COATED ORAL at 09:05

## 2022-04-22 RX ADMIN — IPRATROPIUM BROMIDE AND ALBUTEROL SULFATE 1 AMPULE: .5; 2.5 SOLUTION RESPIRATORY (INHALATION) at 13:25

## 2022-04-22 RX ADMIN — OSELTAMIVIR PHOSPHATE 75 MG: 75 CAPSULE ORAL at 20:05

## 2022-04-22 RX ADMIN — PAROXETINE HYDROCHLORIDE 40 MG: 20 TABLET, FILM COATED ORAL at 09:06

## 2022-04-22 NOTE — H&P
Carlos Malhotra    : 1965  Acct #: [de-identified]  MRN: [id number]              History and physical      Admitting diagnosis: Acute respiratory failure ([address])    Comorbid diagnoses impacting rehabilitation: Influenza A pneumonia, hypoxemia, generalized weakness, gait disturbance, seizure disorder, essential hypertension, cerebral palsy    Chief complaint: \"Too weak to walk\". History of present illness: The patient is a 58-year-old right-hand-dominant male with chronic gait disturbance and left upper limb weakness related to cerebral palsy. He also has a seizure disorder. On 2022 he presented to our ED with increasing shortness of breath and some chest pain. There had been a 3-day history of progressive cough with sputum production and progressive weakness. In our ED he was hypoxic and tachypneic with bilateral pneumonia on chest x-ray. He required supplemental oxygen, steroid therapy and nebulizers. He did not require intubation. His respiratory panel revealed infection with influenza A. His prophylactic antibiotics have been withdrawn and he is also struggled with diarrhea. C. difficile toxin assay was negative. The patient has become too weak to manage his own transfers, toileting and self-care and cannot return directly back to his group home. He requires inpatient rehabilitation to address these issues. Review of systems: 4 extremity clumsiness, dyspnea on exertion and occasional cough. Fair appetite. Loose stools. No dysuria. No change in swallowing. The remainder of their review of systems was negative except as mentioned in the history of present illness. Social History: Lives With:  (group home)  Type of Home: Assisted living (group home.  Usually have 2 aides during day)  Home Layout: One level  Home Access: Level entry  Bathroom Shower/Tub: Walk-in shower  Bathroom Equipment: Shower chair (pt usually independent with showers)  Bathroom Accessibility: Accessible  Home Equipment:  (no use of AD typically, does not own)  Receives Help From: Personal care attendant  ADL Assistance: Independent (intermittent assist with high level ADL)  Homemaking Assistance: Needs assistance  Homemaking Responsibilities: No  Ambulation Assistance: Independent  Transfer Assistance: Independent  Active : No  Patient's  Info: DD services bus  Mode of Transportation: Bus  Occupation: Part time employment  Type of occupation: [address]   Additional Comments: pt without need for AD at baseline    He reports that he has never smoked. He has never used smokeless tobacco. He reports that he does not drink alcohol and does not use drugs. Prior (baseline) level of function: Independent with mobility and minimum assistance with some personal care. Current level of function: Moderate physical assistance for mobility and self-care.     Allergies:  Pcn [penicillins]    Past Medical History:   Past Medical History:   Diagnosis Date    C[facility]y ([facility] [address]     Depression     Hypertension     Seizures ([facility] [address]         Past Surgical History:     Past Surgical History:   Procedure Laterality Date    ABDOMEN SURGERY      EYE SURGERY      UPPER GASTROINTESTINAL ENDOSCOPY N/A 12/3/2019    EGD BIOPSY performed by Swathi Torre MD at [facility] END[facility]COPY       Current Medications:     Current Facility-Administered Medications:     acetaminophen (TYLENOL) tablet 650 mg, 650 mg, Oral, Q6H PRN, 650 mg at 04/21/22 2011 **OR** [DISCONTINUED] acetaminophen (TYLENOL) suppository 650 mg, 650 mg, Rectal, Q6H PRN, Davian Castañeda MD    bisacodyl (DULCOLAX) EC tablet 5 mg, 5 mg, Oral, Daily PRN, Junita Spike, MD  [place]es Pacer ON 4/22/2022] enoxaparin (LOVENOX) injection 40 mg, 40 mg, SubCUTAneous, Daily, Junita Spike, MD  [place] Pacer ON 4/22/2022] amLODIPine (NORVASC) tablet 10 mg, 10 mg, Oral, QAM, Davian Castañeda MD    [facility]) extended release tablet 600 mg, 600 mg, Oral, BID, Penny Bhatti MD, 600 mg at 04/21/22 2011    ipratropium-albuterol (DUONEB) nebulizer solution 1 ampule, 1 ampule, Inhalation, Q4H WA, Penny Bhatti MD, 1 ampule at 04/21/22 1924    [START ON 4/22/2022] levothyroxine (SYNTHROID) tablet 25 mcg, 25 mcg, Oral, Daily, Penny Bhatti MD    oseltamivir (TAMIFLU) capsule 75 mg, 75 mg, Oral, BID, Penny Bhatti MD, 75 mg at 04/21/22 2011    [START ON 4/22/2022] PARoxetine (PAXIL) tablet 40 mg, 40 mg, Oral, QAM, Penny Bhatti MD    ondansetron (ZOFRAN-ODT) disintegrating tablet 4 mg, 4 mg, Oral, Q6H PRN, CASSANDRA Rodriguez MD, 4 mg at 04/21/22 1832    levETIRAcetam (KEPPRA) tablet 750 mg, 750 mg, Oral, BID, Penny Bhatti MD, 750 mg at 04/21/22 2011    albuterol sulfate  (90 Base) MCG/ACT inhaler 2 puff, 2 puff, Inhalation, Q4H PRN, Penny Bhatti MD    acetaminophen (TYLENOL) tablet 650 mg, 650 mg, Oral, Q4H PRN, CASSANDRA Rodriguez MD    polyethylene glycol [facility]) packet 17 g, 17 g, Oral, Daily PRN, CASSANDRA Rodriguez MD    Family History:   No family history on file. Exam:    Blood pressure 132/88, pulse 96, temperature 98.6 °F (37 °C), temperature source Oral, resp. rate 16, height 5' 7\" (1.702 m), weight 165 lb 5.5 oz (75 kg), SpO2 92 %. General: Patient is seen recumbent in bed. He was alert but easily distracted. Able to answer direct questions. Fairly consistent with his history. HEENT: Slow and deliberate speech. Gazing right and left. Neck supple. MMM. Pulmonary: Blunted breath sounds inferiorly. No coughing witnessed. No accessory muscle use. Cardiac: Regular rate and rhythm. Abdomen: Patient's abdomen was soft and nondistended. Bowel sounds were present throughout. There was no rebound, guarding or masses noted. Spinal exam: No skin breakdown or malalignment noted.     Upper extremities: Clumsy weakness of both upper limbs with weaker  and elbow flexion on left compared to right. Poor hand dexterity bilaterally. No significant bruising or swelling. Lower extremities: Increased tone across both knees and ankles. No significant peripheral edema.  4 -/5 strength crossed ankles and the left knee. Heels clear. Sitting balance was fair-.  Standing balance was poor. Lab Results   Component Value Date    WBC 8.9 04/21/2022    HGB 15.7 04/21/2022    HCT 46.7 04/21/2022    MCV 90.9 04/21/2022     04/21/2022     Lab Results   Component Value Date    INR 1.13 12/02/2019    INR 1.08 11/30/2019    PROTIME 12.9 12/02/2019    PROTIME 12.3 11/30/2019     Lab Results   Component Value Date    CREATININE 0.7 (L) 04/21/2022    BUN 8 04/21/2022     04/21/2022    K 3.6 04/21/2022     04/21/2022    CO2 23 04/21/2022     Lab Results   Component Value Date    ALT 33 04/20/2022    AST 21 04/20/2022    ALKPHOS 94 04/20/2022    BILITOT 0.7 04/20/2022         Impression: 54-year-old male with cerebral palsy (tetraparesis), hypertension and a seizure disorder who is developed influenza A pneumonia with hypoxia and general deconditioned. Strengths for the patient: Some experience with adaptive equipment, the support of the group home staff and a reasonably accessible home. Limitations/barriers for the patient: His seizure disorder, the generalized pattern of his weakness and his recurring pattern of falling. Recommendation: Acute inpatient rehabilitation with occupational and physical therapy 180 minutes 5 out of every 7 days. Will address basic and  advancing mobility with self-care instruction and adaptive equipment training. Caregiver education will be offered. Expected length of stay  prior to a supervised level of function for discharge home with a walker and C OT/PT is 10 to 12 days. Additional recommendation:    1. Acute respite failure with gait dysfunction: The patient requires daily occupational and physical therapy.   We must provide aggressive pulmonary hygiene measures, supplement his oxygen to keep saturations greater than 90% and continue his Tamiflu to complete a full course. He is on Mucinex, scheduled duo nebs and cough medicine. He needs adaptive equipment training, possibly caregiver education and bowel and bladder monitoring. DVT prophylaxis. 2. DVT prophylaxis: Lovenox 40 mg subcu daily. I must monitor his hemoglobin and platelet count periodically while on this medication. Weightbearing activities will be pursued daily. GI prophylaxis provided. 3. Seizure disorder: Continuing his baseline AED Keppra 750 mg twice daily. Avoiding exhaustion. Regulating sleep-wake schedule. 4. Hypertension: Norvasc to manage his blood pressure. Target systolic blood pressure is 120-140. Vital signs are checked at rest and with activity. 5. Depression with anxiety: 40 mg daily. Regulating his sleep-wake cycle. Involvement of his group home staff when possible. I personally performed a history and physical on this patient within 24 hours of admission to the rehab unit. I have reviewed the preadmission screening and concur with its findings without change. A detailed plan of care will be established by hospital day 4 and I attest the patient is appropriate for inpatient rehabilitation at this time. I have compared the patient's current functional status noted during my history and physical with that of the preadmission screen and I have found no significant differences.

## 2022-04-22 NOTE — CARE COORDINATION
Case Management Admission Note      Patient:Daniel Jacobsen      NZP:[id number]  GRN:[id number]  Rehab Dx/Hx: Acute respiratory failure with hypoxia [J96.01]  Acute respiratory failure with hypoxemia ([facility] [address] [J96.01]    Chief Complaint:   Past Medical History:   Diagnosis Date    Cerebral palsy ([facility] [address]     Depression     Hypertension     Seizures ([facility] [address]      Past Surgical History:   Procedure Laterality Date    ABDOMEN SURGERY      EYE SURGERY      UPPER GASTROINTESTINAL ENDOSCOPY N/A 12/3/2019    EGD BIOPSY performed by Tianna Riojas MD at [facility] ENDOSCOPY     Allergies   Allergen Reactions    Pcn [Penicillins]      Precautions: falls    Date of Admit: 4/21/2022  Room #: 1009/1009-A      Current functional status at time of admit:        Home Living/DME Available:      Type of Home: Assisted living  Home Access: Level entry  Bathroom Shower/Tub: Walk-in shower,Shower chair with back  Bathroom Toilet: Standard  Bathroom Equipment: Shower chair,Grab bars in shower,Grab bars around [facility]  Home Equipment:  (No AD)       IADL Hx:   Homemaking Responsibilities: Yes  Active : No  Mode of Transportation: [employer]  Occupation: Part time employment  Leisure & Hobbies: Pt enjoys watching TV/sports and playing cards. Spouse: none   Family:  Local aunt    Comments:  Patient plans dc 1-level group home. Usually 2 aides available. Patient was indep & working PTA. Patient's primary contact is case [facility]. Patient has no dc concerns at this time.     Cindy Patrick, 4/22/2022, 9:42 AM

## 2022-04-22 NOTE — PROGRESS NOTES
Pulmonary and Critical Care  Progress Note    Subjective: The patient is comfortable in the chair. On 2 L N/C. Shortness of breath better. Chest pain none. Addressing respiratory complaints Patient is negative for  hemoptysis and cyanosis. CONSTITUTIONAL:  negative for fevers and chills. Past Medical History:     has a past medical history of Cerebral palsy ([facility] [address] Depression, Hypertension, and Seizures ([facility] [address] has a past surgical history that includes Eye surgery; Abdomen surgery; and Upper gastrointestinal endoscopy (N/A, 12/3/2019). reports that he has never smoked. He has never used smokeless tobacco. He reports that he does not drink alcohol and does not use drugs. Family history:  family history is not on file. Allergies   Allergen Reactions    Pcn [Penicillins]      Social History:    Reviewed; no changes    Objective:   PHYSICAL EXAM:        VITALS:  BP (!) 130/96   Pulse 88   Temp 97.8 °F (36.6 °C) (Oral)   Resp 18   Ht 5' 7\" (1.702 m)   Wt 165 lb (74.8 kg)   SpO2 93%   BMI 25.84 kg/m²     24HR INTAKE/OUTPUT:      Intake/Output Summary (Last 24 hours) at 4/22/2022 1028  Last data filed at 4/22/2022 0901  Gross per 24 hour   Intake 238 ml   Output --   Net 238 ml       CONSTITUTIONAL:  Awake. LUNGS:  decreased breath sounds, occ basilar crackles. CARDIOVASCULAR:  normal S1 and S2 and negative JVD  ABD:Abdomen soft, non-tender. BS normal. No masses,  No organomegaly. Sarah Breaux.   DATA:    CBC:  Recent Labs     04/20/22  0956 04/21/22  0919 04/22/22  0209   WBC 8.6 8.9 8.6   RBC 5.15 5.14 4.84   HGB 16.1 15.7 14.7   HCT 47.2 46.7 44.5    297 306   MCV 91.7 90.9 91.9   MCH 31.3* 30.5 30.4   MCHC 34.1 33.6 33.0   RDW 11.9 11.9 11.9   SEGSPCT 54.6 52.9 46.3      BMP:  Recent Labs     04/20/22  0956 04/20/22  0956 04/20/22  1308 04/21/22  0919 04/22/22  0209     --   --  141 145   K 3.3*   < > 3.2* 3.6 3.7     --   --  106 109   CO2 22  --   --  23 25   BUN 8  --   -- 8 12   CREATININE 0.7*  --   --  0.7* 0.8*   CALCIUM 8.1*  --   --  8.4 8.0*   GLUCOSE 101*  --   --  95 101*    < > = values in this interval not displayed. ABG:  No results for input(s): PH, PO2ART, AHJ9HIT, HCO3, BEART, O2SAT in the last 72 hours. Lab Results   Component Value Date    PROBNP 25.54 04/19/2022    PROBNP 26.35 04/17/2022    PROBNP 35.21 06/08/2021     No results found for: [address] [facility]    Radiology Review:  Pertinent images / reports were reviewed as a part of this visit. Assessment:     Patient Active Problem List   Diagnosis    Pneumonia    Cerebral palsy, hemiplegic (HCC)    Hypoxia    Seizure disorder ([address]    Gait disturbance    Pneumonia of both lungs due to infectious organism    Acute upper GI bleed    Cecal polyp    Sebaceous cyst    Acute respiratory failure with hypoxemia (HCC)    Influenza A    Generalized weakness    Essential hypertension    Spastic quadriplegic cerebral palsy ([address]       Plan:   1. Overall the patient has improved. 2. [address] Activity.   Rajiv Chino MD   4/22/2022  10:27 AM

## 2022-04-22 NOTE — PROGRESS NOTES
Comprehensive Nutrition Assessment    Type and Reason for Visit:  Initial,Consult (oral nutrition supplement)    Nutrition Recommendations/Plan:   1. Continue regular diet with snacks as desires  2. May offer oral nutrition supplement during stay as needed/accepted by patient  3. Will continue to follow up during stay     Malnutrition Assessment:  Malnutrition Status:  Insufficient data (04/22/22 1566)    Context:  Acute Illness       Nutrition Assessment:    Admit to rehab with weakness, recent influenza, hx cerebral palsy. Currently on regular diet with meal intake varying % at recent meals. Moderate nutrition risk at this time with intake not yet consistent. Nutrition Related Findings:    sitting up in chair asleep on visits today, droplet isolation for influenza, recent diarrhea C. Diff negative Wound Type: None       Current Nutrition Intake & Therapies:    Average Meal Intake: 26-50%,%  Average Supplements Intake: None Ordered  ADULT DIET; Regular    Anthropometric Measures:  Height: 5' 7\" (170.2 cm)  Ideal Body Weight (IBW): 148 lbs (67 kg)    Admission Body Weight: 142 lb 13.7 oz (64.8 kg)  Current Body Weight: 164 lb 14.5 oz (74.8 kg), 111.4 % IBW. Weight Source: Bed Scale  Current BMI (kg/m2): 25.8  Usual Body Weight: 170 lb 10.2 oz (77.4 kg) (per hx last year)  % Weight Change (Calculated): -3.4  Weight Adjustment For: No Adjustment                 BMI Categories: Overweight (BMI 25.0-29. 9)    Estimated Daily Nutrient Needs:  Energy Requirements Based On: Kcal/kg  Weight Used for Energy Requirements: Current  Energy (kcal/day): 2392-9506  Weight Used for Protein Requirements: Current  Protein (g/day): 74-88 (1-1.2 g/kg)  Method Used for Fluid Requirements: 1 ml/kcal  Fluid (ml/day): 2200    Nutrition Diagnosis:   · Predicted inadequate energy intake related to other (comment) (reduced appetite in illness) as evidenced by other (comment) (inconsistent intake)      Nutrition Interventions: Food and/or Nutrient Delivery: Continue Current Diet,Snacks (Comment),Start Oral Nutrition Supplement  Nutrition Education/Counseling: No recommendation at this time  Coordination of Nutrition Care: Continue to monitor while inpatient       Goals:     Goals: PO intake 75% or greater       Nutrition Monitoring and Evaluation:   Behavioral-Environmental Outcomes: None Identified  Food/Nutrient Intake Outcomes: Food and Nutrient Intake,Diet Advancement/Tolerance  Physical Signs/Symptoms Outcomes: Biochemical Data,Diarrhea,Meal Time Behavior,Skin,Weight    Discharge Planning:    Continue current diet     Wendie Amos RD, LD  Contact: [phone]

## 2022-04-22 NOTE — PROGRESS NOTES
Physical Therapy    [facility] ARU PHYSICAL THERAPY EVALUATION    Chart Review:  Past Medical History:   Diagnosis Date    [facility] [address] [address]     Depression     Hypertension     Seizures ([facility] [address]      Past Surgical History:   Procedure Laterality Date    ABDOMEN SURGERY      EYE SURGERY      UPPER GASTROINTESTINAL ENDOSCOPY N/A 12/3/2019    EGD BIOPSY performed by Bean Bowden MD at [facility] ENDOSCOPY     Fall History: 4-5 falls in the last year c no significant injuries  Social History:  Social/Functional History  Lives With: Other (comment) (Pt lives in a group home.)  Type of Home: Assisted living  Home Layout: One level  Home Access: Level entry  Bathroom Shower/Tub: Walk-in shower,Shower chair with back  Bathroom Toilet: Standard  Bathroom Equipment: Shower chair,Grab bars in shower,Grab bars around [place] [facility]  Bathroom Accessibility: Accessible  Home Equipment:  (No AD)  Has the patient had two or more falls in the past year or any fall with injury in the past year?: Yes (Pt reports 4-5 falls in the last year c no significant injuries.)  Receives Help From: Personal care attendant (Group home staff cook meals and completes majority of the housework.)  ADL Assistance: [address]: [address] [facility] Responsibilities: Yes  Laundry Responsibility: Primary  Cleaning Responsibility: Secondary (Pt reports he cleans his own dishes.)  Ambulation Assistance: Independent  Transfer Assistance: Independent  Active : No  Patient's  Info: DD services Aylin Backer of Transportation: She Schmitt  Occupation: Part time employment  Type of Occupation: [employer], 4 days a week [address]: Pt enjoys watching TV/sports and playing cards. Additional Comments: Pt reports he has a flat bed at home.     Restrictions:  Restrictions/Precautions  Restrictions/Precautions: Fall Risk,Seizure,General Precautions (Droplet precautions; R hand contracture, CP)  Position Activity Restriction  Other position/activity restrictions: monitor O2    Subjective: Pt resting in recliner, alert and pleasant, states sleeping and eating well, required assist to reposition nasal cannula appropriately. Pain Level: 0  Pain Location: Back    Objective:  Orientation  Overall Orientation Status: Within Normal Limits        Vision  Vision Exceptions: Wears glasses at all times,Cataracts (Pt had B cataract Sx last month.  Pt does not have glasses on this ARU currently.)  Hearing  Hearing: Within functional limits    ROM:      AROM RLE (degrees)  RLE General AROM: generally decreased but functional due to Hx of CP     AROM LLE (degrees)  LLE General AROM: generally decreased but functional due to Hx of CP                 Strength:    Strength RLE  Comment: chronic weakness due to Hx of CP  Strength LLE  Comment: decreased but functional              Bed Mobility:   Lying to Sitting on Side of Bed  Assistance Needed: Supervision or touching assistance  Comment: SBA without use of bed features  CARE Score: 4  Discharge Goal: Independent  Roll Left and Right  Assistance Needed: Supervision or touching assistance  Comment: SBA-Sup; completed rolling to R without use of bed features; pt only able to complete partial rolling to L without use of bed features and eventually required use of bed rail to complete full rolling to L  CARE Score: 4  Discharge Goal: Independent  Sit to Lying  Assistance Needed: Supervision or touching assistance  Comment: SBA without use of bed features with additional assist on O2 line management  CARE Score: 4  Discharge Goal: Independent    Transfers:    Sit to Stand  Assistance Needed: Supervision or touching assistance  Comment: CGA without AD  CARE Score: 4  Discharge Goal: Independent  Chair/Bed-to-Chair Transfer  Assistance Needed: Supervision or touching assistance  Comment: CGA without AD with additional assist on O2 line management  CARE Score: 4  Discharge Goal: Independent Car Transfer  Assistance Needed: Supervision or touching assistance  Comment: CGA without AD with additional assist on O2 line management  CARE Score: 4  Discharge Goal: Independent    Ambulation:   Device used PTA: none    Walking Ability  Does the Patient Walk?: Yes     Walk 10 Feet  Assistance Needed: Dependent  Comment: required 2-person assist for safety (Min A without AD + SBA of 2nd person for close w/c follow and O2 line/tank management); 2nd person was necessary due to pt's unsteady gait quality and unpredictable strength, balance, and endurance  CARE Score: 1  Discharge Goal: Independent     Walk 50 Feet with Two Turns  Comment: max tolerance today was 37 ft without AD with 2-person assist (Min A + SBA of 2nd person for close w/c follow and O2 line/tank management); ambulation tolerance was limited by fatigue and SOB; SpO2 at 2L O2 was 96% after this maximal exertion  Reason if not Attempted: Not attempted due to medical condition or safety concerns  CARE Score: 88  Discharge Goal: Independent     Walk 150 Feet  Reason if not Attempted: Not attempted due to medical condition or safety concerns  CARE Score: 88  Discharge Goal: Independent     Walking 10 Feet on Uneven Surfaces  Assistance Needed: Dependent  Comment: required 2-person assist for safety (Min A without AD + SBA of 2nd person for close w/c follow and O2 line/tank management)  CARE Score: 1  Discharge Goal: Supervision or touching assistance     1 Step (Curb)  Assistance Needed: Dependent  Comment: Min A x 2 without AD (required HHA to be able to shift weight with RLE leading on ascent and LLE leading on descent of 4\" step height); pt required additional assist on O2 line/tank management during this mobility task  CARE Score: 1  Discharge Goal: Supervision or touching assistance     4 Steps  Assistance Needed: Dependent  Comment: pt was not performing this at baseline due to fear of falling, however was able to complete with 2-person assist today for safety (Min A + SBA of 2nd person using railings (pt was able to functionally use RUE during this mobility task); required additional assist on O2 line management; pt completed task with step-to pattern with RLE leading consistently on ascent and LLE leading consistently on descent  Discharge Goal: Supervision or touching assistance     12 Steps  Comment: max tolerance today was 10 steps using railings with 2-person assist for safety activity tolerance was limited by fatigue; pt completed task with step-to pattern with RLE leading consistently on ascent and LLE leading consistently on descent  Reason if not Attempted: Not attempted due to medical condition or safety concerns  CARE Score: 88  Discharge Goal: Supervision or touching assistance    Gait Deviations: []None [x] delayed gait initiation and slow guera  [] Increased GILBERTO  [x] Staggers []Deviated Path  [] Decreased step length  [] Decreased step height  []Decreased arm swing  [] Shuffles  [] Decreased head and trunk rotation  [x]other: chronic gait deficits due to Hx of CP        Wheelchair:  w/c Ability: Wheelchair Ability  Uses a Wheelchair and/or Scooter?: No                Balance:        Object: Picking Up Object  Assistance Needed: Partial/moderate assistance  Comment: Min A without use of reacher  CARE Score: 3  Discharge Goal: Independent    Assessment:   The patient is a 61year old male admitted onto ARU after hospitalization for increasing SOB and some chest pain. Pt had progressive and productive coughing and progressive weakness as well. Xray revealed  bilateral pneumonia requiring supplemental O2, steroid therapy,and nebulizers. Pt was also found to have Influenza A. Pt is currently on droplet precautions and required 2L O2 via nasal cannula. Pt with Hx of CP affecting more his R side and so he had chronic balance, strength, and gait impairments but was functional to ambulate Ind and be able to work.  Pt was assessed without an AD today to compare with his PLOF and to determine his potential to progress back to Ind mobility status. His gait quality was very guarded and cautious due to his debilitated state requiring 2-person assist today for safety. It is anticipated that his automatic movements will improve with continued functional mobility training but is expected to still have chronic gait impairments. Pt will benefit from use of AD for advanced gait training due his impaired balance that is more apparent as observed today. Pt's ability to progress towards established PT goals will highly depend on the improvement and stability of his pulmonary status. Body Structures, Functions, Activity Limitations Requiring Skilled Therapeutic Intervention: Decreased functional mobility ,Decreased ADL status,Decreased endurance,Decreased balance,Decreased strength,Decreased posture,Decreased coordination,Decreased tolerance to work activity     Therapy Prognosis: Good  Decision Making: Medium Complexity  Clinical Presentation: evolving with changing characteristics      Patient education:   ARU schedule, ARU expectations for participation, plan of care  Treatment Initiated:  Functional mobility training, gait training, patient education  Barriers to Improvement: pulmonary status, chronic balance and strength impairments related to Hx of CP  Discharge Recommendations:  [facility] PT   Equipment Recommendations:  TBD (considering cane versus 2WW)    Goals:  Patient Goals   Patient goals : to return to PLOF, ho home, and return to work eventually     Long Term Goals  Time Frame for Long term goals : 10 tx days:  Long term goal 1: Pt will complete bed mobility (scooting, rolling R/L, and sup<->sit) Ind.  Long term goal 2: Pt will complete OOB transfers Mod Ind-Ind. Long term goal 3: Pt will ambulate 150 ft over level surface using LRAD prn Mod Ind-Ind.   Long term goal 4: Pt will ambulate at least 10 ft of uneven surface and ascend/descend curb step using LRAD with SBA-Sup. Long term goal 5: Pt will ascend/descend 1 flight of stairs using railings with SBA. Long term goal 6: Pt will complete object retrieval from the floor using reacher Mod Ind.   Plan:    Requires PT Follow-Up: Yes  Pt will be seen at least 60 minutes per day for a minimum of 5 days per week, plus group therapy as appropriate  Plan  Current Treatment Recommendations: Strengthening,Endurance training,Safety education & training,Balance training,Functional mobility training,Transfer training,Gait training,Stair training,Return to work related activity,Home exercise program,Patient/Caregiver education & training,Equipment evaluation, education, & procurement,Therapeutic activities    PT Individual Minutes  Time In: 1030  Time Out: 1200  Minutes: 90        Timed Code Treatment Minutes: 75 Minutes    Number of Minutes/Billable Intervention    PT Evaluation 15   Gait Training 45   Therapeutic Exercise    Neuro Re-Ed    Therapeutic Activity 30   Wheelchair Propulsion    Group    Other:    TOTAL 90       Electronically signed by:    Ruben Martinez, PT  4/22/2022, 12:47

## 2022-04-22 NOTE — PROGRESS NOTES
Occupational Therapy                              [facility] ARU OCCUPATIONAL THERAPY EVALUATION    Chart Review:  Past Medical History:   Diagnosis Date    [facility]bral [facility] ([facility] [address]     Depression     Hypertension     Seizures ([facility] [address]      Past Surgical History:   Procedure Laterality Date    ABDOMEN SURGERY      EYE SURGERY      UPPER GASTROINTESTINAL ENDOSCOPY N/A 12/3/2019    EGD BIOPSY performed by Lali Quintanilla MD at [address] [facility] ENDOSCOPY     Social History:  Social/Functional History  Lives With: Other (comment) (Pt lives in a group home.)  Type of Home: Assisted living  Home Layout: One level  Home Access: Level entry  Bathroom Shower/Tub: Walk-in shower,Shower chair with back  Bathroom Toilet: Standard  Bathroom Equipment: Shower chair,Grab bars in shower,Grab bars around [facility]  Bathroom Accessibility: Accessible  Home Equipment:  (No AD)  Has the patient had two or more falls in the past year or any fall with injury in the past year?: Yes (Pt reports 4-5 falls in the last year c no significant injuries.)  Receives Help From: Personal care attendant (Group home staff cook meals and completes majority of the housework.)  ADL Assistance: [address]: [address] [facility] Responsibilities: Yes  Laundry Responsibility: Primary  Cleaning Responsibility: Secondary (Pt reports he cleans his own dishes.)  Ambulation Assistance: Independent  Transfer Assistance: Independent  Active : No  Patient's  Info: CLARICE services Chester Martinez of Transportation: Chad Hurtado  Occupation: Part time employment  Type of Occupation: [employer], 4 days a week [address]: Pt enjoys watching TV/sports and playing cards. Additional Comments: Pt reports he has a flat bed at home.     Restrictions:  Restrictions/Precautions  Restrictions/Precautions: Fall Risk,Seizure,General Precautions (Droplet precautions; R hand contracture, CP)        Position Activity Restriction  Other position/activity restrictions: monitor O2         Pain Level: 0  Pain Location: Back    Objective:  Observation/Palpation  Observation: Pt lying in bed upon entrance, pleasant and agreeable to therapy. Pt has R hand/wrist flexion contracture. Body Mechanics: Pt requiring cues for correct hand/foot/2WW placement for functional transfers. Vision  Vision Exceptions: Wears glasses at all times,Cataracts (Pt had B cataract Sx last month. Pt does not have glasses on this ARU currently.)  Vision - Basic Assessment  Visual History: Cataracts (B cataracts Sx last month.)  Hearing  Hearing: Within functional limits    ROM:      LUE AROM (degrees)  LUE AROM : WFL     Left Hand AROM (degrees)  Left Hand AROM: WFL     RUE AROM (degrees)  RUE AROM : WFL     Right Hand AROM (degrees)  Right Hand AROM: Exceptions (Pt c R wrist/hand/digit flexion contractures c slight decreased AROM.)    Strength:    LUE Strength  Gross LUE Strength: WFL  L Hand General: 4+/5  LUE Strength Comment: 4+/5 grossly  RUE Strength  Gross RUE Strength: WFL  R Hand General: 4/5  RUE Strength Comment: 4/5 grossly    Quality of Movement: Tone RUE  RUE Tone: Hypertonic  Tone Description: Pt c R hand/wrist flexion contractures. Tone LUE  LUE Tone: Normotonic  Coordination  Movements Are Fluid And Coordinated: Yes  Coordination and Movement Description: Fine motor impairments       Sensation:    Sensation  Overall Sensation Status: WNL     ADLs:  Eating: Eating  Assistance Needed: Setup or clean-up assistance  Comment: setup assist to eat breakfast as Pt needed assistance opening container. CARE Score: 5  Discharge Goal: Independent       Oral Hygiene: Oral Hygiene  Assistance Needed: Independent  Comment: Mod I seated at sink. CARE Score: 6  Discharge Goal: Independent    UB/LB Bathing: Shower/Bathe Self  Assistance Needed: Partial/moderate assistance  Comment: Pt required assist for thorough rear washing.   CARE Score: 3  Discharge Goal: Independent    UB Dressing: Upper Body Dressing  Assistance Needed: Supervision or touching assistance  Comment: SBA to don pullover tshirt. CARE Score: 4  Discharge Goal: Independent         LB Dressing:   Lower Body Dressing  Assistance Needed: Supervision or touching assistance  Comment: CGA in stance to pull down/pull up depends and pants. CARE Score: 4  Discharge Goal: Independent    Donning and Chase Footwear: Putting On/Taking Off Footwear  Assistance Needed: Supervision or touching assistance  Comment: CGA seated in W/C to don socks and shoes. CARE Score: 4  Discharge Goal: Independent      Toile[address] needed: Supervision or touching assistance  Comment: CGA in stance for perineal hygiene after BM. CARE Score: 4  Discharge Goal: Independent      Bed Mobility:    Bed mobility  Supine to Sit: Moderate assistance    Transfers:    Transfers  Sit Pivot Transfers: Contact guard assistance  Sit to stand: Contact guard assistance     Shower Transfers  Shower - Transfer From: Mickey Loss - Transfer Type: To and From  Shower - Transfer To:  (shower bench)  Shower - Technique: Ambulating  Shower Transfers: Contact Guard     Toilet Transfer  Assistance needed: Supervision or touching assistance  Comment: CGA utilizing 2WW and grab bars.   CARE Score: 4  Discharge Goal: Independent    Functional Mobility:    Balance  Sitting Balance: Supervision  Standing Balance: Contact guard assistance     Functional Mobility  Functional - Mobility Device: Rolling Walker  Activity: To/from bathroom  Assist Level: Contact guard assistanceApparatus Needs  Apparatus Needs: O2    Cognition:  Cognition  Overall Cognitive Status: WFL    Perception:  Perception  Overall Perceptual Status: WFL      Assessment:     Performance deficits / Impairments: Decreased functional mobility ,Decreased strength,Decreased endurance,Decreased coordination,Decreased ADL status,Decreased safe awareness,Decreased high-level IADLs,Decreased balance,Decreased fine motor control    The patient is a 61year old male admitted onto ARU after hospitalization for c/o BLE numbness, R arm pain, weakness, and dry cough c associated chest tightness. Pt found to be hypoxic in the 80s on RA. Pt dx c acute respiratory failure 2/2 influenza A. Pt now on 2 L O2 via NC. PTA, Pt living in group home, IND c all ADLs and housework including laundry and dishes. Pt's group home staff completes cooking and all other household tasks. Pt works a part-time job at [employer] as a , 4 days a week for 3 hour shifts. 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 Today, Pt completed all ADLs including full shower c Min A for LB bathing and CGA for all other tasks. Pt CGA c 2WW for all mobility and functional transfers. The QI, MMT, and ROM standardized assessments were used this date to determine the above performance deficits, which compromise pt's ability to safely complete ADLs/IADLs/mobility. Pt will benefit from ARU OT services to increase functional performance and return to PLOF. Decision Making: Medium Complexity  Clinical Presentation:  Pt presents to this ARU c deficits including functional balance/mobility/transfers, endurance, strength, ax tolerance, FMC, and ADLs/IADLs. Patient education:   ARU geovanni, Role of O.T., O.T. plan of care  []   Patient goal was established and reviewed in Rehab[facility]er with patient and/or family this date. REQUIRES OT FOLLOW-UP: Yes  Discharge Recommendations:  Group home c [facility]. Equipment Recommendations:  None at this time. Goals:     Short Term Goals  Time Frame for Short term goals: STGs=LTGs  Long Term Goals  Time Frame for Long term goals : 7-10 days or until d/c. Long Term Goal 1: Pt will complete self-feeding c Ind.   Long Term Goal 2: Pt will complete grooming tasks c Mod I.  Long Term Goal 3: Pt will complete total body bathing c Mod I.  Long Term Goal 4: Pt will complete UB dressing c Ind.  Long Term Goal 5: Pt will complete LB dressing c Mod I. Additional Goals?: Yes  Long Term Goal 6: Pt will doff/don footwear c Mod I.  Long Term Goal 7: Pt will complete toileting c Mod I.  Long Term Goal 8: Pt will perform functional transfers (bed, chair, toilet, shower) c DME PRN and Mod I.  Long Term Goal 9: Pt will perform therex/therax to facilitate increased strength/endurance/ax tolerance (c emphasis on dynamic standing balance/tolerance > 8 mins) c Mod I.  Long Term Goal 10:  Pt will complete home management tasks c Mod I.    Plan:    Pt will be seen at least 60 minutes per day for a minimum of 5 days per week, plus group therapy as appropriate  Current Treatment Recommendations: Strengthening,Balance training,Functional mobility training,Endurance training,Neuromuscular re-education,Safety education & training,Patient/Caregiver education & training,Equipment evaluation, education, & procurement,Self-Care / ADL,Home management training    OT Individual Minutes  Time In: 8498  Time Out: 5421  Minutes: 93                Number of Minutes/Billable Intervention      OT Evaluation 25   Therapeutic Exercise    ADL Self-care 68   Neuro Re-Ed    Therapeutic Activity    Group    Other:    TOTAL 93     Electronically signed by:    DAYSI Catalan, OTR/L    4/22/2022, 12:48 PM

## 2022-04-23 PROCEDURE — 1280000000 HC REHAB R&B

## 2022-04-23 PROCEDURE — 97110 THERAPEUTIC EXERCISES: CPT

## 2022-04-23 PROCEDURE — 97116 GAIT TRAINING THERAPY: CPT

## 2022-04-23 PROCEDURE — 2700000000 HC OXYGEN THERAPY PER DAY

## 2022-04-23 PROCEDURE — 97112 NEUROMUSCULAR REEDUCATION: CPT

## 2022-04-23 PROCEDURE — 94640 AIRWAY INHALATION TREATMENT: CPT

## 2022-04-23 PROCEDURE — 6370000000 HC RX 637 (ALT 250 FOR IP): Performed by: INTERNAL MEDICINE

## 2022-04-23 PROCEDURE — 97535 SELF CARE MNGMENT TRAINING: CPT

## 2022-04-23 PROCEDURE — 94761 N-INVAS EAR/PLS OXIMETRY MLT: CPT

## 2022-04-23 PROCEDURE — 97530 THERAPEUTIC ACTIVITIES: CPT

## 2022-04-23 PROCEDURE — 6370000000 HC RX 637 (ALT 250 FOR IP): Performed by: PHYSICAL MEDICINE & REHABILITATION

## 2022-04-23 PROCEDURE — 6360000002 HC RX W HCPCS: Performed by: INTERNAL MEDICINE

## 2022-04-23 RX ADMIN — BENZONATATE 100 MG: 100 CAPSULE ORAL at 20:50

## 2022-04-23 RX ADMIN — LEVETIRACETAM 750 MG: 500 TABLET, FILM COATED ORAL at 20:49

## 2022-04-23 RX ADMIN — AMLODIPINE BESYLATE 10 MG: 10 TABLET ORAL at 09:34

## 2022-04-23 RX ADMIN — BENZONATATE 100 MG: 100 CAPSULE ORAL at 09:34

## 2022-04-23 RX ADMIN — PAROXETINE HYDROCHLORIDE 40 MG: 20 TABLET, FILM COATED ORAL at 09:34

## 2022-04-23 RX ADMIN — ACETAMINOPHEN 650 MG: 325 TABLET ORAL at 05:26

## 2022-04-23 RX ADMIN — IPRATROPIUM BROMIDE AND ALBUTEROL SULFATE 1 AMPULE: .5; 2.5 SOLUTION RESPIRATORY (INHALATION) at 07:45

## 2022-04-23 RX ADMIN — ENOXAPARIN SODIUM 40 MG: 100 INJECTION SUBCUTANEOUS at 09:36

## 2022-04-23 RX ADMIN — LEVETIRACETAM 750 MG: 500 TABLET, FILM COATED ORAL at 09:34

## 2022-04-23 RX ADMIN — BENZONATATE 100 MG: 100 CAPSULE ORAL at 14:39

## 2022-04-23 RX ADMIN — GUAIFENESIN 600 MG: 600 TABLET, EXTENDED RELEASE ORAL at 20:49

## 2022-04-23 RX ADMIN — IPRATROPIUM BROMIDE AND ALBUTEROL SULFATE 1 AMPULE: .5; 2.5 SOLUTION RESPIRATORY (INHALATION) at 15:23

## 2022-04-23 RX ADMIN — LEVOTHYROXINE SODIUM 25 MCG: 0.05 TABLET ORAL at 05:27

## 2022-04-23 RX ADMIN — GUAIFENESIN 600 MG: 600 TABLET, EXTENDED RELEASE ORAL at 09:34

## 2022-04-23 ASSESSMENT — PAIN SCALES - GENERAL
PAINLEVEL_OUTOF10: 0

## 2022-04-23 NOTE — PROGRESS NOTES
ARU Night Shift RN notes:  [id number] This patient is having frequent hacking cough. His respiratory panel revealed infection with influenza A. He was given breathing treatment, tamiflu and mucinex but still is having hacking cough and he cannot lay on his back, high fowlers is his comfortable position. Dr. Sabrina Leigh was sent a [employer] note:Orders received and was carried out. Will continue to monitor this patient. No s/s of distress and discomforts.

## 2022-04-23 NOTE — PROGRESS NOTES
Physical Rehabilitation: OCCUPATIONAL THERAPY     [x] daily progress note       [] discharge       Patient Name:  Nora Ocasio   :  1965 MRN: [id number]  Room:  [address] [place] [address] Date of Admission: 2022  Rehabilitation Diagnosis:   Acute respiratory failure with hypoxia [J96.01]  Acute respiratory failure with hypoxemia (HCC) [J96.01]       Date 2022       Day of ARU Week:  3   Time IN/-930  [id number]   Individual Tx Minutes 120   Group Tx Minutes    Co-Treat Minutes    Concurrent Tx Minutes    TOTAL Tx Time Mins 120   Variance Time    Variance Time []   Refusal due to:     []   Medical hold/reason:    []   Illness   []   Off Unit for test/procedure  []   Extra time needed to complete task  []   Therapeutic need  []   Other (specify):   Restrictions Restrictions/Precautions  Restrictions/Precautions: Fall Risk,Seizure,General Precautions (Droplet precautions; R hand contracture, CP)  Position Activity Restriction  Other position/activity restrictions: monitor O2   Communication with other providers: [x]   OK to see per nursing:     []   Spoke with team member regarding:      Subjective observations and cognitive status:  Pt alert orientated and agreeable to tx.      Pain level/location:   0 /10       Location: none reported   Discharge recommendations  Anticipated discharge date:  TBD  Destination: []home alone   []home alone w assist prn [] home w/ family    [] Continuous supervision       []SNF            [] Assisted living     [] Other:   Continued therapy: []HHC OT  []OUTPATIENT  OT   [] No Further OT  Equipment needs: TBD       Eating/Feeding:     LEBRON   Extremity Used:        []    R UE [x]    L UE         Dentures: [x]   N/A    []    Partial []    Full  Adaptive Equipment Used:  None required    Grooming:   SUP  Oral Hygiene:  LEBRON      Bathing:   UB-SUP         LB-SUP      Dressing:      Upper Body Dressing:  SUP  Lower Body Dressing:  SUP   Footwear: MIN A to bring sock over heel    Toileting:   SUP       Toilet Transfers:   CGA  Device Used:    []   Standard Toilet         []   Joan Melena           []  Bedside Commode       []   Elevated Toilet          []   Other:        Tub/Shower Transfer:   CGA  Device Used:    [x]   Shower Bench      []   Shower Chair      []   Tub Transfer Bench           []   Bathtub    []   Shower         []   Other:         Bed Mobility:           []   Pt received out of bed   Rolling R/L:  SUP  Scooting:  SUP  Supine --> Sit:  SUP  Sit --> Supine:  SUP    Transfers:    Sit--> Stand:  SBA  Stand --> Sit:   SBA  Stand-Pivot:   SBA  Other:    Assistive device required for transfer:   RW      Functional Mobility:    Assistance:  SBA c w/c folllow  Device:   [x]   Rolling Walker     []   Standard Walker []   Wheelchair        []   [employer]       []   4-Wheeled Maximino Cadence         []   Cardiac Walker       []   Other:    Additional Therapeutic activities/exercises completed this date:     [x]   ADL Training   [x]   Balance/Postural training     [x]   Bed/Transfer Training   [x]   Endurance Training   []   Neuromuscular Re-ed   []   Nu-step:  Time:        Level:         #Steps:       []   Rebounder:    []  Seated     []  Standing        []   Supine Ther Ex (reps/sets):     []   Seated Ther Ex (reps/sets):     []   Standing Ther Ex (reps/sets):     [x]   Other: For BUE strengthening for ADL indep & functional moblility, pt completed arm bike use x 20 min c Min resistance with 3 rest breaks each 5 min & with min difficulty      S/p demo by OT, pt.manipulated medium strength theraputty by squeezing, pinching, rolling with moderate difficulty for fine motor strengthening & coordination. Pt completed BUE strengthening exercise of rolling pin on easy resistance theraputty to  Increase independence in Self Care tasks.   Comments:       Pt completed therapeutic activity x  15 sit<>stands @SUP while completing reaching task to increase strength and aerobic capacity to complete ADLs and ADL transfers/functional mobility. Patient/Caregiver Education and Training:   []   [employer] Equipment Use  [x]   Bed Mobility/Transfer Technique/Safety  [x]   Energy Conservation Tips  []   Family training  [x]   Postural Awareness  [x]   Safety During Functional Activities  []   Reinforced Patient's Precautions   []   Progress was updated and reviewed in Rehabtracker with patient and/or family this         date. Treatment Plan for Next Session: continue in current POC      Assessment: This pt demonstrated a positive response to today's treatment as evidenced by pt verbilization. The patient is making good progress toward established goals as evidenced by QI scores. Ongoing deficits are observed in the areas of impulsiveness. nd continued focus on progressive strengthening is recommended.        Treatment/Activity Tolerance:   [x] Tolerated treatment with no adverse effects    [] Patient limited by fatigue  [] Patient limited by pain   [] Patient limited by medical complications:    [] Adverse reaction to Tx:   [] Significant change in status    Safety:       []  bed alarm set    [x]  chair alarm set    []  Pt refused alarms                []  Telesitter activated      [x]  Gait belt used during tx session      []other:       Number of Minutes/Billable Intervention  Therapeutic Exercise 30   ADL Self-care 60   Neuro Re-Ed    Therapeutic Activity 30   Group    Other:    TOTAL 120       Social History  Social/Functional History  Lives With: Other (comment) (Pt lives in a group home.)  Type of Home: Assisted living  Home Layout: One level  Home Access: Level entry  Bathroom Shower/Tub: Walk-in shower,Shower chair with back  Bathroom Toilet: Standard  Bathroom Equipment: Shower chair,Grab bars in shower,Grab bars around [facility]  Bathroom Accessibility: Accessible  Home Equipment:  (No AD)  Has the patient had two or more falls in the past year or any fall with injury in the past year?: Yes (Pt reports 4-5 falls in the last year c no significant injuries.)  Receives Help From: Personal care attendant (Group home staff cook meals and completes majority of the housework.)  ADL Assistance: [address]: Independent  Homemaking Responsibilities: Yes  Laundry Responsibility: Primary  Cleaning Responsibility: Secondary (Pt reports he cleans his own dishes.)  Ambulation Assistance: Independent  Transfer Assistance: Independent  Active : No  Patient's  Info: DD services Heri Butts of Transportation: Karrie Riding  Occupation: Part time employment  Type of Occupation: [employer], 4 days a week 3hrs  Leisure & Hobbies: Pt enjoys watching TV/sports and playing cards. Additional Comments: Pt reports he has a flat bed at home. Objective                                                                                    Goals:  (Update in navigator)  Short Term Goals  Time Frame for Short term goals: STGs=LTGs:  Long Term Goals  Time Frame for Long term goals : 7-10 days or until d/c. Long Term Goal 1: Pt will complete self-feeding c Ind. Long Term Goal 2: Pt will complete grooming tasks c Mod I.  Long Term Goal 3: Pt will complete total body bathing c Mod I.  Long Term Goal 4: Pt will complete UB dressing c Ind. Long Term Goal 5: Pt will complete LB dressing c Mod I. Additional Goals?: Yes  Long Term Goal 6: Pt will doff/don footwear c Mod I.  Long Term Goal 7: Pt will complete toileting c Mod I.  Long Term Goal 8: Pt will perform functional transfers (bed, chair, toilet, shower) c DME PRN and Mod I.  Long Term Goal 9: Pt will perform therex/therax to facilitate increased strength/endurance/ax tolerance (c emphasis on dynamic standing balance/tolerance > 8 mins) c Mod I.  Long Term Goal 10:  Pt will complete home management tasks c Mod I.:        Plan of Care                                                                              Times per week: 5 days per week for a minimum of 60 minutes/day plus group as appropriate for 60 minutes.   Treatment to include Plan  Times per Day: Daily  Current Treatment Recommendations: Strengthening,Balance training,Functional mobility training,Endurance training,Neuromuscular re-education,Safety education & training,Patient/Caregiver education & training,Equipment evaluation, education, & procurement,Self-Care / ADL,Home management training    Electronically signed by   MARLY Leyva #[id number]. 2016 4/23/2022, 8:21 AM

## 2022-04-23 NOTE — PROGRESS NOTES
Carlos Malhotra    : 1965  Acct #: [de-identified]  MRN: [id number]              PM&R Progress Note      Admitting diagnosis: Acute respiratory failure ([address])     Comorbid diagnoses impacting rehabilitation: Influenza A pneumonia, hypoxemia, generalized weakness, gait disturbance, seizure disorder, essential hypertension, cerebral palsy     Chief complaint: No complaints verbalized. Prior (baseline) level of function: Independent. Current level of function:         Current  IRF-BENNETT and Goals:   Occupational Therapy:    Short Term Goals  Time Frame for Short term goals: STGs=LTGs :   Long Term Goals  Time Frame for Long term goals : 7-10 days or until d/c. Long Term Goal 1: Pt will complete self-feeding c Ind. Long Term Goal 2: Pt will complete grooming tasks c Mod I.  Long Term Goal 3: Pt will complete total body bathing c Mod I.  Long Term Goal 4: Pt will complete UB dressing c Ind. Long Term Goal 5: Pt will complete LB dressing c Mod I. Additional Goals?: Yes  Long Term Goal 6: Pt will doff/don footwear c Mod I.  Long Term Goal 7: Pt will complete toileting c Mod I.  Long Term Goal 8: Pt will perform functional transfers (bed, chair, toilet, shower) c DME PRN and Mod I.  Long Term Goal 9: Pt will perform therex/therax to facilitate increased strength/endurance/ax tolerance (c emphasis on dynamic standing balance/tolerance > 8 mins) c Mod I.  Long Term Goal 10: Pt will complete home management tasks c Mod I. :                                       Eating: Eating  Assistance Needed: Setup or clean-up assistance  Comment: setup assist to eat breakfast as Pt needed assistance opening container. CARE Score: 5  Discharge Goal: Independent       Oral Hygiene: Oral Hygiene  Assistance Needed: Independent  Comment: Mod I seated at sink.   CARE Score: 6  Discharge Goal: Independent    UB/LB Bathing: Shower/Bathe Self  Assistance Needed: Partial/moderate assistance  Comment: Pt required assist for thorough rear washing. CARE Score: 3  Discharge Goal: Independent    UB Dressing: Upper Body Dressing  Assistance Needed: Supervision or touching assistance  Comment: SBA to don pullover tshirt. CARE Score: 4  Discharge Goal: Independent         LB Dressing: Lower Body Dressing  Assistance Needed: Supervision or touching assistance  Comment: CGA in stance to pull down/pull up depends and pants. CARE Score: 4  Discharge Goal: Independent    Donning and [place] Footwear: Putting On/Taking Off Footwear  Assistance Needed: Supervision or touching assistance  Comment: CGA seated in W/C to don socks and shoes. CARE Score: 4  Discharge Goal: Independent      Toiletin [address] needed: Supervision or touching assistance  Comment: CGA in stance for perineal hygiene after BM. CARE Score: 4  Discharge Goal: Independent      Toilet Transfers: Toilet Transfer  Assistance needed: Supervision or touching assistance  Comment: CGA utilizing 2WW and grab bars. CARE Score: 4  Discharge Goal: Independent    Physical Therapy:         Long Term Goals  Time Frame for Long term goals : 10 tx days:  Long term goal 1: Pt will complete bed mobility (scooting, rolling R/L, and sup<->sit) Ind.  Long term goal 2: Pt will complete OOB transfers Mod Ind-Ind. Long term goal 3: Pt will ambulate 150 ft over level surface using LRAD prn Mod Ind-Ind. Long term goal 4: Pt will ambulate at least 10 ft of uneven surface and ascend/descend curb step using LRAD with SBA-Sup. Long term goal 5: Pt will ascend/descend 1 flight of stairs using railings with SBA. Long term goal 6: Pt will complete object retrieval from the floor using reacher Mod Ind.       Bed Mobility:   Sit to Lying  Assistance Needed: Supervision or touching assistance  Comment: SBA without use of bed features with additional assist on O2 line management  CARE Score: 4  Discharge Goal: Independent  Roll Left and Right  Assistance Needed: Supervision or touching assistance  Comment: SBA-Sup; completed rolling to R without use of bed features; pt only able to complete partial rolling to L without use of bed features and eventually required use of bed rail to complete full rolling to L  CARE Score: 4  Discharge Goal: Independent  Lying to Sitting on Side of Bed  Assistance Needed: Supervision or touching assistance  Comment: SBA without use of bed features  CARE Score: 4  Discharge Goal: Independent    Transfers:    Sit to Stand  Assistance Needed: Supervision or touching assistance  Comment: CGA without AD  CARE Score: 4  Discharge Goal: Independent  Chair/Bed-to-Chair Transfer  Assistance Needed: Supervision or touching assistance  Comment: CGA without AD with additional assist on O2 line management  CARE Score: 4  Discharge Goal: Independent     Car Transfer  Assistance Needed: Supervision or touching assistance  Comment: CGA without AD with additional assist on O2 line management  CARE Score: 4  Discharge Goal: Independent    Ambulation:    Walking Ability  Does the Patient Walk?: Yes     Walk 10 Feet  Assistance Needed: Dependent  Comment: required 2-person assist for safety (Min A without AD + SBA of 2nd person for close w/c follow and O2 line/tank management); 2nd person was necessary due to pt's unsteady gait quality and unpredictable strength, balance, and endurance  CARE Score: 1  Discharge Goal: Independent     Walk 50 Feet with Two Turns  Comment: max tolerance today was 37 ft without AD with 2-person assist (Min A + SBA of 2nd person for close w/c follow and O2 line/tank management); ambulation tolerance was limited by fatigue and SOB; SpO2 at 2L O2 was 96% after this maximal exertion  Reason if not Attempted: Not attempted due to medical condition or safety concerns  CARE Score: 88  Discharge Goal: Independent     Walk 150 Feet  Reason if not Attempted: Not attempted due to medical condition or safety concerns  CARE Score: 88  Discharge Goal: Independent     Walking 10 Feet on Uneven Surfaces  Assistance Needed: Dependent  Comment: required 2-person assist for safety (Min A without AD + SBA of 2nd person for close w/c follow and O2 line/tank management)  CARE Score: 1  Discharge Goal: Supervision or touching assistance     1 Step (Curb)  Assistance Needed: Dependent  Comment: Min A x 2 without AD (required HHA to be able to shift weight with RLE leading on ascent and LLE leading on descent of 4\" step height); pt required additional assist on O2 line/tank management during this mobility task  CARE Score: 1  Discharge Goal: Supervision or touching assistance     4 Steps  Assistance Needed: Dependent  Comment: pt was not performing this at baseline due to fear of falling, however was able to complete with 2-person assist today for safety (Min A + SBA of 2nd person using railings (pt was able to functionally use RUE during this mobility task); required additional assist on O2 line management; pt completed task with step-to pattern with RLE leading consistently on ascent and LLE leading consistently on descent  Discharge Goal: Supervision or touching assistance     12 Steps  Comment: max tolerance today was 10 steps using railings with 2-person assist for safety activity tolerance was limited by fatigue; pt completed task with step-to pattern with RLE leading consistently on ascent and LLE leading consistently on descent  Reason if not Attempted: Not attempted due to medical condition or safety concerns  CARE Score: 88  Discharge Goal: Supervision or touching assistance       Wheelchair:  w/c Ability: Wheelchair Ability  Uses a Wheelchair and/or Scooter?: No                Balance:        Object: Picking Up Object  Assistance Needed: Partial/moderate assistance  Comment: Min A without use of reacher  CARE Score: 3  Discharge Goal: Independent    I      Exam:    Blood pressure 120/82, pulse 80, temperature 98.4 °F (36.9 °C), temperature source Oral, resp. rate 18, height 5' 7\" (1.702 m), weight 165 lb (74.8 kg), SpO2 93 %. General: Observed sitting up in a bedside chair eating dinner. Alert and smiling. Pleasant. HEENT: Slow and deliberate with soft speech. Answers direct questions fairly consistently. Neck supple. MMM. Pulmonary: Mildly blunted breath sounds inferiorly. No wheezing. Occasional cough. Cardiac: RRR. Abdomen: Patient's abdomen is soft and nondistended. Bowel sounds were present throughout. There was no rebound, guarding or masses noted. Upper extremities: Dysmetric movements of both upper limbs with increased tone across both elbows and wrist.  Fair  strength. Poor dexterity. Lower extremities: Calves were soft and nontender. Heels clear. No significant edema. Increased tone across the knees and ankles. Clumsy movements. Sitting balance was fair. Standing balance was poor. Lab Results   Component Value Date    WBC 8.6 04/22/2022    HGB 14.7 04/22/2022    HCT 44.5 04/22/2022    MCV 91.9 04/22/2022     04/22/2022     Lab Results   Component Value Date    INR 1.13 12/02/2019    INR 1.08 11/30/2019    PROTIME 12.9 12/02/2019    PROTIME 12.3 11/30/2019     Lab Results   Component Value Date    CREATININE 0.8 (L) 04/22/2022    BUN 12 04/22/2022     04/22/2022    K 3.7 04/22/2022     04/22/2022    CO2 25 04/22/2022     Lab Results   Component Value Date    ALT 33 04/20/2022    AST 21 04/20/2022    ALKPHOS 94 04/20/2022    BILITOT 0.7 04/20/2022       Expected length of stay  prior to a supervised level of function for discharge home with a walker and C OT/PT is 12 to 14 days. Recommendations:    1. Acute respite failure with gait dysfunction: Developing the routine for his daily occupational and physical therapy. Providing him aggressive pulmonary hygiene measures, supplemental oxygen to keep saturations greater than 90% and continuing his Tamiflu to complete a full course.   He requires Mucinex, scheduled duo nebs and cough medicine. He needs adaptive equipment training, possibly caregiver education and bowel and bladder monitoring. Verbal cues and minimum to moderate physical assistance for transfers. 2. DVT prophylaxis: Lovenox 40 mg subcu daily. I must monitor his hemoglobin and platelet count periodically while on this medication. Weightbearing activities are pursued daily. GI prophylaxis provided. No signs of acute blood loss. 3. Seizure disorder: Continuing his baseline AED Keppra 750 mg twice daily. Avoiding exhaustion. Regulating sleep-wake schedule. No clinical evidence of active seizures. 4. Hypertension: Norvasc to manage his blood pressure. Target systolic blood pressure is 120-140. Vital signs are checked at rest and with activity. Blood pressure within target range. 5. Depression with anxiety: 40 mg daily. Regulating his sleep-wake cycle. Involvement of his group home staff when possible. Fair sleep reported.   Engaging in treatment.

## 2022-04-23 NOTE — PLAN OF CARE
Problem: Discharge Planning  Goal: Discharge to home or other facility with appropriate resources  Outcome: Progressing     Problem: Safety - Adult  Goal: Free from fall injury  Outcome: Progressing     Problem: ABCDS Injury Assessment  Goal: Absence of physical injury  Outcome: Progressing     Problem: Skin/Tissue Integrity  Goal: Absence of new skin breakdown  Description: 1. Monitor for areas of redness and/or skin breakdown  2. Assess vascular access sites hourly  3. Every 4-6 hours minimum:  Change oxygen saturation probe site  4. Every 4-6 hours:  If on nasal continuous positive airway pressure, respiratory therapy assess nares and determine need for appliance change or resting period.   Outcome: Progressing     Problem: Nutrition Deficit:  Goal: Optimize nutritional status  Outcome: Progressing

## 2022-04-23 NOTE — PROGRESS NOTES
Physical Therapy  [x] daily progress note       [] discharge       Patient Name:  Nash Vela   :  1965 MRN: [id number]  Room:  [address] [place] [address] Date of Admission: 2022  Rehabilitation Diagnosis:   Acute respiratory failure with hypoxia [J96.01]  Acute respiratory failure with hypoxemia (HCC) [J96.01]       Date 2022       Day of ARU Week:  3   Time IN/-1030   Individual Tx Minutes 60   Group Tx Minutes    Co-Treat Minutes    Concurrent Tx Minutes    TOTAL Tx Time Mins 60   Variance Time    Variance Time []   Refusal due to:     []   Medical hold/reason:    []   Illness   []   Off Unit for test/procedure  []   Extra time needed to complete task  []   Therapeutic need  []   Other (specify):   Restrictions Restrictions/Precautions  Restrictions/Precautions: Fall Risk,Seizure,General Precautions (Droplet precautions; R hand contracture, CP)  Position Activity Restriction  Other position/activity restrictions: monitor O2   Communication with other providers: [x]   OK to see per nursing:     []   Spoke with team member regarding:      Subjective observations and cognitive status:  Pt observed sitting in recliner upon PTA arrival, pt denies pain and is agreeable to session this date. Pain level/location:    0/10       Location:    Discharge recommendations  Anticipated discharge date: TBD   Destination: []home alone   []home alone with assist PRN     [] home w/ family      [] Continuous supervision  []SNF    [] Assisted living     [] Other:   Continued therapy: []HHC PT  []OUTPATIENT  PT   [] No Further PT  Equipment needs:TBD         Bed Mobility:           [x]   Pt received out of bed     Transfers:    Sit--> Stand:  CGA/SBA    Stand --> Sit:   CGA/SBA  Chair-->Bed/Bed --> Chair:   NT  Car Transfers:  NT  Toilet Transfer (if applicable):    Other:    Assistive device required for transfer:   fww    Gait:    Distance:  35', 58', 75'   Assistance:  CGA/SBA  Device:  fww  Gait Quality: Slow step to progressing to step through pattern, 85% continuous gait pattern, pt demo's wide turns to R c cues to improve safety c AD management and for proper foot placement to improve balance. Additional Therapeutic activities/exercises completed this date:     []   Nu-step:  Time:  5min       Level:    3        []   Rebounder:    []  Seated     []  Standing        []   Balance training - fwd/bwd box step x 3 BL @ HHA        []   Postural training    []   Supine ther ex (reps/sets):     []   Seated ther ex (reps/sets):     [x]   Standing ther ex (reps/sets):  HL/TR, HK/BK, hip ABD/ext and mini squat all performed 2 x 10 reps ea. Intermittent rest breaks required upon fatigue and mm weakness R>L   []   Picking up object from floor (standing):                   []   Reacher used   []   Other:   []   Other:    Comments:      Patient/Caregiver Education and Training:   []   Bed Mobility/Transfer technique/safety  [x]   Gait technique/sequencing  []   Proper use of assistive device  [x]   Advanced mobility safety and technique  []   Reinforced patient's precautions/mobility while maintaining precautions  []   Postural awareness  []   Family training  []   Progress was updated and reviewed in Rehabtracker with patient and/or family this date. Treatment Plan for Next Session: progress balance, gait quality and strength    Assessment: This pt demonstrated a  positive response to today's treatment as evidenced by gait progressions and increased activity tolerance. The patient is making  progress toward established goals as evidenced by QI scores. Ongoing deficits are observed in the areas of LE strength, balance and gait  and continued focus on above mentioned is recommended.      Treatment/Activity Tolerance:   [x] Tolerated treatment with no adverse effects    [] Patient limited by fatigue  [] Patient limited by pain   [] Patient limited by medical complications:    [] Adverse reaction to Tx:   [] Significant change in status    Safety:       []  bed alarm set    [x]  chair alarm set    []  Pt refused alarms                []  Telesitter activated      [x]  Gait belt used during tx session      []other:         Number of Minutes/Billable Intervention  Gait Training 30   Therapeutic Exercise 18   Neuro Re-Ed 12   Therapeutic Activity    Wheelchair Propulsion    Group    Other:    TOTAL 60         Social History  Social/Functional History  Lives With: Other (comment) (Pt lives in a group home.)  Type of Home: Assisted living  Home Layout: One level  Home Access: Level entry  Bathroom Shower/Tub: Walk-in shower,Shower chair with back  Bathroom Toilet: Standard  Bathroom Equipment: Shower chair,Grab bars in shower,Grab [facility]  Bathroom Accessibility: Accessible  Home Equipment:  (No AD)  Has the patient had two or more falls in the past year or any fall with injury in the past year?: Yes (Pt reports 4-5 falls in the last year c no significant injuries.)  Receives Help From: Personal care attendant (Group home staff cook meals and completes majority of the housework.)  ADL Assistance: [address] [place] [address] Avenue: Independent  Homemaking Responsibilities: Yes  Laundry Responsibility: Primary  Cleaning Responsibility: Secondary (Pt reports he cleans his own dishes.)  Ambulation Assistance: Independent  Transfer Assistance: Independent  Active : No  Patient's  Info:  genet Gerhardt Hams of Transportation: [facility]  Occupation: Part time employment  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 Additional Comments: Pt reports he has a flat bed at home. Objective                                                                                    Goals:  (Update in navigator)   :   Long Term Goals  Time Frame for Long term goals : 10 tx days:  Long term goal 1: Pt will complete bed mobility (scooting, rolling R/L, and sup<->sit) Ind.  Long term goal 2: Pt will complete OOB transfers Mod Ind-Ind. Long term goal 3: Pt will ambulate 150 ft over level surface using LRAD prn Mod Ind-Ind. Long term goal 4: Pt will ambulate at least 10 ft of uneven surface and ascend/descend curb step using LRAD with SBA-Sup. Long term goal 5: Pt will ascend/descend 1 flight of stairs using railings with SBA. Long term goal 6: Pt will complete object retrieval from the floor using reacher Mod Ind.:        Plan of Care                                                                              Times per week: 5 days per week for a minimum of 60 minutes/day plus group as appropriate for 60 minutes.   Treatment to include Current Treatment Recommendations: Strengthening,Endurance training,Safety education & training,Balance training,Functional mobility training,Transfer training,Gait training,Stair training,Return to work related activity,Home exercise program,Patient/Caregiver education & training,Equipment evaluation, education, & procurement,Therapeutic activities    Electronically signed by   Nargis Bowens, PTA [id number]  4/23/2022, 9:52 AM

## 2022-04-23 NOTE — PROGRESS NOTES
Pulmonary and Critical Care  Progress Note    Subjective: The patient is doing well. Having PT. On RA. Shortness of breath better. Chest pain none. Addressing respiratory complaints Patient is negative for  hemoptysis and cyanosis. CONSTITUTIONAL:  negative for fevers and chills. Past Medical History:     has a past medical history of Cerebral palsy ([facility] [address] Depression, Hypertension, and Seizures ([facility] [address] has a past surgical history that includes Eye surgery; Abdomen surgery; and Upper gastrointestinal endoscopy (N/A, 12/3/2019). reports that he has never smoked. He has never used smokeless tobacco. He reports that he does not drink alcohol and does not use drugs. Family history:  family history is not on file. Allergies   Allergen Reactions    Pcn [Penicillins]      Social History:    Reviewed; no changes    Objective:   PHYSICAL EXAM:        VITALS:  /84   Pulse 98   Temp 97.9 °F (36.6 °C) (Oral)   Resp 16   Ht 5' 7\" (1.702 m)   Wt 165 lb (74.8 kg)   SpO2 92%   BMI 25.84 kg/m²     24HR INTAKE/OUTPUT:      Intake/Output Summary (Last 24 hours) at 4/23/2022 1039  Last data filed at 4/23/2022 0216  Gross per 24 hour   Intake 1200 ml   Output 400 ml   Net 800 ml       CONSTITUTIONAL:  Awake. LUNGS:  decreased breath sounds, occ basilar crackles. CARDIOVASCULAR:  normal S1 and S2 and negative JVD  ABD:Abdomen soft, non-tender. BS normal. No masses,  No organomegaly. Sarah Breaux.   DATA:    CBC:  Recent Labs     04/21/22  0919 04/22/22 0209   WBC 8.9 8.6   RBC 5.14 4.84   HGB 15.7 14.7   HCT 46.7 44.5    306   MCV 90.9 91.9   MCH 30.5 30.4   MCHC 33.6 33.0   RDW 11.9 11.9   SEGSPCT 52.9 46.3      BMP:  Recent Labs     04/20/22  1308 04/21/22  0919 04/22/22  0209   NA  --  141 145   K 3.2* 3.6 3.7   CL  --  106 109   CO2  --  23 25   BUN  --  8 12   CREATININE  --  0.7* 0.8*   CALCIUM  --  8.4 8.0*   GLUCOSE  --  95 101*      ABG:  No results for input(s): PH, PO2ART, ATZ9VYG, HCO3, BEART, O2SAT in the last 72 hours. Lab Results   Component Value Date    PROBNP 25.54 04/19/2022    PROBNP 26.35 04/17/2022    PROBNP 35.21 06/08/2021     No results found for: [facility]    Radiology Review:  Pertinent images / reports were reviewed as a part of this visit. Assessment:     Patient Active Problem List   Diagnosis    Pneumonia    Cerebral palsy, hemiplegic (HCC)    Hypoxia    Seizure disorder ([address]    Gait disturbance    Pneumonia of both lungs due to infectious organism    Acute upper GI bleed    Cecal polyp    Sebaceous cyst    Acute respiratory failure with hypoxemia (HCC)    Influenza A    Generalized weakness    Essential hypertension    Spastic quadriplegic cerebral palsy ([address]       Plan:   1. Overall the patient has improved. 2. Inc. Activity.   Alee Jane MD   4/23/2022  10:39 AM

## 2022-04-24 PROCEDURE — 94761 N-INVAS EAR/PLS OXIMETRY MLT: CPT

## 2022-04-24 PROCEDURE — 94640 AIRWAY INHALATION TREATMENT: CPT

## 2022-04-24 PROCEDURE — 6360000002 HC RX W HCPCS: Performed by: INTERNAL MEDICINE

## 2022-04-24 PROCEDURE — 2700000000 HC OXYGEN THERAPY PER DAY

## 2022-04-24 PROCEDURE — 1280000000 HC REHAB R&B

## 2022-04-24 PROCEDURE — 6370000000 HC RX 637 (ALT 250 FOR IP): Performed by: PHYSICAL MEDICINE & REHABILITATION

## 2022-04-24 PROCEDURE — 6370000000 HC RX 637 (ALT 250 FOR IP): Performed by: INTERNAL MEDICINE

## 2022-04-24 RX ADMIN — ENOXAPARIN SODIUM 40 MG: 100 INJECTION SUBCUTANEOUS at 08:55

## 2022-04-24 RX ADMIN — IPRATROPIUM BROMIDE AND ALBUTEROL SULFATE 1 AMPULE: .5; 2.5 SOLUTION RESPIRATORY (INHALATION) at 11:33

## 2022-04-24 RX ADMIN — GUAIFENESIN 600 MG: 600 TABLET, EXTENDED RELEASE ORAL at 20:11

## 2022-04-24 RX ADMIN — PAROXETINE HYDROCHLORIDE 40 MG: 20 TABLET, FILM COATED ORAL at 08:55

## 2022-04-24 RX ADMIN — BENZONATATE 100 MG: 100 CAPSULE ORAL at 14:21

## 2022-04-24 RX ADMIN — IPRATROPIUM BROMIDE AND ALBUTEROL SULFATE 1 AMPULE: .5; 2.5 SOLUTION RESPIRATORY (INHALATION) at 08:02

## 2022-04-24 RX ADMIN — LEVETIRACETAM 750 MG: 500 TABLET, FILM COATED ORAL at 08:55

## 2022-04-24 RX ADMIN — GUAIFENESIN 600 MG: 600 TABLET, EXTENDED RELEASE ORAL at 08:55

## 2022-04-24 RX ADMIN — LEVETIRACETAM 750 MG: 500 TABLET, FILM COATED ORAL at 20:11

## 2022-04-24 RX ADMIN — IPRATROPIUM BROMIDE AND ALBUTEROL SULFATE 1 AMPULE: .5; 2.5 SOLUTION RESPIRATORY (INHALATION) at 15:58

## 2022-04-24 RX ADMIN — BENZONATATE 100 MG: 100 CAPSULE ORAL at 08:55

## 2022-04-24 RX ADMIN — BENZONATATE 100 MG: 100 CAPSULE ORAL at 20:11

## 2022-04-24 RX ADMIN — AMLODIPINE BESYLATE 10 MG: 10 TABLET ORAL at 08:55

## 2022-04-24 RX ADMIN — LEVOTHYROXINE SODIUM 25 MCG: 0.05 TABLET ORAL at 05:47

## 2022-04-24 RX ADMIN — IPRATROPIUM BROMIDE AND ALBUTEROL SULFATE 1 AMPULE: .5; 2.5 SOLUTION RESPIRATORY (INHALATION) at 19:58

## 2022-04-24 ASSESSMENT — PAIN SCALES - GENERAL
PAINLEVEL_OUTOF10: 0
PAINLEVEL_OUTOF10: 0

## 2022-04-25 PROCEDURE — 6370000000 HC RX 637 (ALT 250 FOR IP): Performed by: INTERNAL MEDICINE

## 2022-04-25 PROCEDURE — 97116 GAIT TRAINING THERAPY: CPT

## 2022-04-25 PROCEDURE — 97530 THERAPEUTIC ACTIVITIES: CPT

## 2022-04-25 PROCEDURE — 94640 AIRWAY INHALATION TREATMENT: CPT

## 2022-04-25 PROCEDURE — 6370000000 HC RX 637 (ALT 250 FOR IP): Performed by: PHYSICAL MEDICINE & REHABILITATION

## 2022-04-25 PROCEDURE — 6360000002 HC RX W HCPCS: Performed by: INTERNAL MEDICINE

## 2022-04-25 PROCEDURE — 99232 SBSQ HOSP IP/OBS MODERATE 35: CPT | Performed by: PHYSICAL MEDICINE & REHABILITATION

## 2022-04-25 PROCEDURE — 2700000000 HC OXYGEN THERAPY PER DAY

## 2022-04-25 PROCEDURE — 97110 THERAPEUTIC EXERCISES: CPT

## 2022-04-25 PROCEDURE — 94761 N-INVAS EAR/PLS OXIMETRY MLT: CPT

## 2022-04-25 PROCEDURE — 1280000000 HC REHAB R&B

## 2022-04-25 PROCEDURE — 97535 SELF CARE MNGMENT TRAINING: CPT

## 2022-04-25 RX ADMIN — IPRATROPIUM BROMIDE AND ALBUTEROL SULFATE 1 AMPULE: .5; 2.5 SOLUTION RESPIRATORY (INHALATION) at 07:45

## 2022-04-25 RX ADMIN — BENZONATATE 100 MG: 100 CAPSULE ORAL at 09:15

## 2022-04-25 RX ADMIN — AMLODIPINE BESYLATE 10 MG: 10 TABLET ORAL at 09:15

## 2022-04-25 RX ADMIN — GUAIFENESIN 600 MG: 600 TABLET, EXTENDED RELEASE ORAL at 20:04

## 2022-04-25 RX ADMIN — BENZONATATE 100 MG: 100 CAPSULE ORAL at 20:04

## 2022-04-25 RX ADMIN — IPRATROPIUM BROMIDE AND ALBUTEROL SULFATE 1 AMPULE: .5; 2.5 SOLUTION RESPIRATORY (INHALATION) at 11:37

## 2022-04-25 RX ADMIN — IPRATROPIUM BROMIDE AND ALBUTEROL SULFATE 1 AMPULE: .5; 2.5 SOLUTION RESPIRATORY (INHALATION) at 15:32

## 2022-04-25 RX ADMIN — ACETAMINOPHEN 650 MG: 325 TABLET ORAL at 05:26

## 2022-04-25 RX ADMIN — GUAIFENESIN 600 MG: 600 TABLET, EXTENDED RELEASE ORAL at 09:15

## 2022-04-25 RX ADMIN — ENOXAPARIN SODIUM 40 MG: 100 INJECTION SUBCUTANEOUS at 09:15

## 2022-04-25 RX ADMIN — IPRATROPIUM BROMIDE AND ALBUTEROL SULFATE 1 AMPULE: .5; 2.5 SOLUTION RESPIRATORY (INHALATION) at 18:53

## 2022-04-25 RX ADMIN — PAROXETINE HYDROCHLORIDE 40 MG: 20 TABLET, FILM COATED ORAL at 09:15

## 2022-04-25 RX ADMIN — BENZONATATE 100 MG: 100 CAPSULE ORAL at 14:51

## 2022-04-25 RX ADMIN — LEVETIRACETAM 750 MG: 500 TABLET, FILM COATED ORAL at 09:15

## 2022-04-25 RX ADMIN — LEVETIRACETAM 750 MG: 500 TABLET, FILM COATED ORAL at 20:04

## 2022-04-25 RX ADMIN — LEVOTHYROXINE SODIUM 25 MCG: 0.05 TABLET ORAL at 05:26

## 2022-04-25 ASSESSMENT — PAIN SCALES - GENERAL
PAINLEVEL_OUTOF10: 0
PAINLEVEL_OUTOF10: 0

## 2022-04-25 NOTE — PROGRESS NOTES
Daria Edouard    : 1965  Acct #: [de-identified]  MRN: [id number]              PM&R Progress Note      Admitting diagnosis: Acute respiratory failure ([address])     Comorbid diagnoses impacting rehabilitation: Influenza A pneumonia, hypoxemia, generalized weakness, gait disturbance, seizure disorder, essential hypertension, cerebral palsy     Chief complaint: Denied chills or any increased sputum with coughing. Eating better. Prior (baseline) level of function: Independent. Current level of function:         Current  IRF-BENNETT and Goals:   Occupational Therapy:    Short Term Goals  Time Frame for Short term goals: STGs=LTGs :   Long Term Goals  Time Frame for Long term goals : 7-10 days or until d/c. Long Term Goal 1: Pt will complete self-feeding c Ind. Long Term Goal 2: Pt will complete grooming tasks c Mod I.  Long Term Goal 3: Pt will complete total body bathing c Mod I.  Long Term Goal 4: Pt will complete UB dressing c Ind. Long Term Goal 5: Pt will complete LB dressing c Mod I. Additional Goals?: Yes  Long Term Goal 6: Pt will doff/don footwear c Mod I.  Long Term Goal 7: Pt will complete toileting c Mod I.  Long Term Goal 8: Pt will perform functional transfers (bed, chair, toilet, shower) c DME PRN and Mod I.  Long Term Goal 9: Pt will perform therex/therax to facilitate increased strength/endurance/ax tolerance (c emphasis on dynamic standing balance/tolerance > 8 mins) c Mod I.  Long Term Goal 10:  Pt will complete home management tasks c Mod I. :                                       Eating: Eating  Assistance Needed: Independent  Comment: per patient able to complete independently  CARE Score: 6  Discharge Goal: Independent       Oral Hygiene: Oral Hygiene  Assistance Needed: Independent  Comment: X  CARE Score: 6  Discharge Goal: Independent    UB/LB Bathing: Shower/Bathe Self  Assistance Needed: Supervision or touching assistance  Comment: SBA in stance, seated majority of shower  CARE Score: 4  Discharge Goal: Independent    UB Dressing: Upper Body Dressing  Assistance Needed: Setup or clean-up assistance  Comment: Set up  CARE Score: 5  Discharge Goal: Independent         LB Dressing: Lower Body Dressing  Assistance Needed: Supervision or touching assistance  Comment: SBA in stance managing over hips, threads independently using figure four method  CARE Score: 4  Discharge Goal: Independent    Donning and [place] Footwear: Putting On/Taking Off Footwear  Assistance Needed: Setup or clean-up assistance  Comment: Set up uses figure four manages both socks and shoes  CARE Score: 5  Discharge Goal: Independent      Toiletin [address] needed: Supervision or touching assistance  Comment: SBA in stance managing  pants, completes hygiene  seated  CARE Score: 4  Discharge Goal: Independent      Toilet Transfers: Toilet Transfer  Assistance needed: Supervision or touching assistance  Comment: SB/CGA  CARE Score: 4  Discharge Goal: Independent    Physical Therapy:         Long Term Goals  Time Frame for Long term goals : 10 tx days:  Long term goal 1: Pt will complete bed mobility (scooting, rolling R/L, and sup<->sit) Ind.  Long term goal 2: Pt will complete OOB transfers Mod Ind-Ind. Long term goal 3: Pt will ambulate 150 ft over level surface using LRAD prn Mod Ind-Ind. Long term goal 4: Pt will ambulate at least 10 ft of uneven surface and ascend/descend curb step using LRAD with SBA-Sup. Long term goal 5: Pt will ascend/descend 1 flight of stairs using railings with SBA. Long term goal 6: Pt will complete object retrieval from the floor using reacher Mod Ind.       Bed Mobility:   Sit to Lying  Assistance Needed: Supervision or touching assistance  Comment: SBA without use of bed features with additional assist on O2 line management  CARE Score: 4  Discharge Goal: Independent  Roll Left and Right  Assistance Needed: Supervision or touching assistance  Comment: SBA-Sup; completed rolling to R without use of bed features; pt only able to complete partial rolling to L without use of bed features and eventually required use of bed rail to complete full rolling to L  CARE Score: 4  Discharge Goal: Independent  Lying to Sitting on Side of Bed  Assistance Needed: Supervision or touching assistance  Comment: SBA without use of bed features  CARE Score: 4  Discharge Goal: Independent    Transfers:    Sit to Stand  Assistance Needed: Supervision or touching assistance  Comment: CGA without AD  CARE Score: 4  Discharge Goal: Independent  Chair/Bed-to-Chair Transfer  Assistance Needed: Supervision or touching assistance  Comment: CGA without AD with additional assist on O2 line management  CARE Score: 4  Discharge Goal: Independent     Car Transfer  Assistance Needed: Supervision or touching assistance  Comment: CGA without AD with additional assist on O2 line management  CARE Score: 4  Discharge Goal: Independent    Ambulation:    Walking Ability  Does the Patient Walk?: Yes     Walk 10 Feet  Assistance Needed: Dependent  Comment: required 2-person assist for safety (Min A without AD + SBA of 2nd person for close w/c follow and O2 line/tank management); 2nd person was necessary due to pt's unsteady gait quality and unpredictable strength, balance, and endurance  CARE Score: 1  Discharge Goal: Independent     Walk 50 Feet with Two Turns  Comment: max tolerance today was 37 ft without AD with 2-person assist (Min A + SBA of 2nd person for close w/c follow and O2 line/tank management); ambulation tolerance was limited by fatigue and SOB; SpO2 at 2L O2 was 96% after this maximal exertion  Reason if not Attempted: Not attempted due to medical condition or safety concerns  CARE Score: 88  Discharge Goal: Independent     Walk 150 Feet  Reason if not Attempted: Not attempted due to medical condition or safety concerns  CARE Score: 88  Discharge Goal: Independent     Walking 10 Feet on Uneven Surfaces  Assistance Needed: Dependent  Comment: required 2-person assist for safety (Min A without AD + SBA of 2nd person for close w/c follow and O2 line/tank management)  CARE Score: 1  Discharge Goal: Supervision or touching assistance     1 Step (Curb)  Assistance Needed: Dependent  Comment: Min A x 2 without AD (required HHA to be able to shift weight with RLE leading on ascent and LLE leading on descent of 4\" step height); pt required additional assist on O2 line/tank management during this mobility task  CARE Score: 1  Discharge Goal: Supervision or touching assistance     4 Steps  Assistance Needed: Dependent  Comment: pt was not performing this at baseline due to fear of falling, however was able to complete with 2-person assist today for safety (Min A + SBA of 2nd person using railings (pt was able to functionally use RUE during this mobility task); required additional assist on O2 line management; pt completed task with step-to pattern with RLE leading consistently on ascent and LLE leading consistently on descent  Discharge Goal: Supervision or touching assistance     12 Steps  Comment: max tolerance today was 10 steps using railings with 2-person assist for safety activity tolerance was limited by fatigue; pt completed task with step-to pattern with RLE leading consistently on ascent and LLE leading consistently on descent  Reason if not Attempted: Not attempted due to medical condition or safety concerns  CARE Score: 88  Discharge Goal: Supervision or touching assistance       Wheelchair:  w/c Ability: Wheelchair Ability  Uses a Wheelchair and/or Scooter?: No                Balance:        Object: Picking Up Object  Assistance Needed: Partial/moderate assistance  Comment: Min A without use of reacher  CARE Score: 3  Discharge Goal: Independent    I      Exam:    Blood pressure 119/83, pulse 76, temperature 97.3 °F (36.3 °C), temperature source Oral, resp.  rate 16, height 5' 7\" (1.702 m), weight 167 lb 12.3 oz (76.1 kg), SpO2 95 %. General: Up in a bedside chair. Talkative with staff. In no distress. HEENT: Dysarthric speech quality with a supple neck. MMM. Pulmonary: No wheezes or rales. Cardiac: Regular rate and rhythm. Abdomen: Patient's abdomen is soft and nondistended. Bowel sounds were present throughout. There was no rebound, guarding or masses noted. Upper extremities: Dysmetric movements of both upper limbs with fair-coordination. No new bruising. Lower extremities: No signs of DVT. Dysmetric. Sitting balance was fair+. Standing balance was poor. Lab Results   Component Value Date    WBC 8.6 04/22/2022    HGB 14.7 04/22/2022    HCT 44.5 04/22/2022    MCV 91.9 04/22/2022     04/22/2022     Lab Results   Component Value Date    INR 1.13 12/02/2019    INR 1.08 11/30/2019    PROTIME 12.9 12/02/2019    PROTIME 12.3 11/30/2019     Lab Results   Component Value Date    CREATININE 0.8 (L) 04/22/2022    BUN 12 04/22/2022     04/22/2022    K 3.7 04/22/2022     04/22/2022    CO2 25 04/22/2022     Lab Results   Component Value Date    ALT 33 04/20/2022    AST 21 04/20/2022    ALKPHOS 94 04/20/2022    BILITOT 0.7 04/20/2022       Expected length of stay  prior to a supervised level of function for discharge home with a walker and C OT/PT is 2 weeks. Recommendations:    1. Acute respite failure with gait dysfunction: Responds to cues to engage in the daily occupational and physical therapy. He is benefiting from aggressive pulmonary hygiene measures, supplemental oxygen to keep saturations greater than 90%. He has completed a full course of the Tamiflu. Some urinary urgency. No bowel incontinence. Ongoing duo nebs, cough medicine and adaptive equipment training. Verbal cues and minimum to moderate physical assistance for transfers again today.   2. DVT prophylaxis: Lovenox 40 mg subcu daily.  I must monitor his hemoglobin and platelet count periodically while on this medication.  Weightbearing activities are slowly improving daily.  GI prophylaxis provided. No new bruising or swelling. 3. Seizure disorder: Tolerating his home AED med of Keppra 750 mg twice daily.  Avoiding exhaustion.  Regulating sleep-wake schedule. No clinical evidence of active seizures. 4. Hypertension: Norvasc to manage his blood pressure.  Target systolic blood pressure is 120-140.  Vital signs are checked at rest and with activity. Blood pressure is essentially within target range. 5. Depression with anxiety: 40 mg daily.  Sleeping a little better.  Involvement of his group home staff when possible. Fair sleep reported.   Engaging in treatment.

## 2022-04-25 NOTE — PROGRESS NOTES
Pulmonary and Critical Care  Progress Note    Subjective: The patient is sitting in the chair. Having PT. On RA. Shortness of breath better. Chest pain none. Addressing respiratory complaints Patient is negative for  hemoptysis and cyanosis. CONSTITUTIONAL:  negative for fevers and chills. Past Medical History:     has a past medical history of Cerebral palsy ([facility] [address] Depression, Hypertension, and Seizures ([facility] [address] has a past surgical history that includes Eye surgery; Abdomen surgery; and Upper gastrointestinal endoscopy (N/A, 12/3/2019). reports that he has never smoked. He has never used smokeless tobacco. He reports that he does not drink alcohol and does not use drugs. Family history:  family history is not on file. Allergies   Allergen Reactions    Pcn [Penicillins]      Social History:    Reviewed; no changes    Objective:   PHYSICAL EXAM:        VITALS:  /83   Pulse 76   Temp 97.3 °F (36.3 °C) (Oral)   Resp 16   Ht 5' 7\" (1.702 m)   Wt 167 lb 12.3 oz (76.1 kg)   SpO2 95%   BMI 26.28 kg/m²     24HR INTAKE/OUTPUT:      Intake/Output Summary (Last 24 hours) at 4/25/2022 1151  Last data filed at 4/25/2022 0350  Gross per 24 hour   Intake 440 ml   Output 750 ml   Net -310 ml       CONSTITUTIONAL:  Awake. LUNGS:  decreased breath sounds, occ basilar crackles. CARDIOVASCULAR:  normal S1 and S2 and negative JVD  ABD:Abdomen soft, non-tender. BS normal. No masses,  No organomegaly. Jairo Vazquez. DATA:    CBC:  No results for input(s): WBC, RBC, HGB, HCT, PLT, MCV, MCH, MCHC, RDW, NRBC, SEGSPCT, BANDSPCT in the last 72 hours. BMP:  No results for input(s): NA, K, CL, CO2, BUN, CREATININE, CALCIUM, GLUCOSE in the last 72 hours. ABG:  No results for input(s): PH, PO2ART, WZB7MMI, HCO3, BEART, O2SAT in the last 72 hours.   Lab Results   Component Value Date    PROBNP 25.54 04/19/2022    PROBNP 26.35 04/17/2022    PROBNP 35.21 06/08/2021     No results found for: [facility]    Radiology Review:  Pertinent images / reports were reviewed as a part of this visit. Assessment:     Patient Active Problem List   Diagnosis    Pneumonia    Cerebral palsy, hemiplegic (HCC)    Hypoxia    Seizure disorder ([facility])    Gait disturbance    Pneumonia of both lungs due to infectious organism    Acute upper GI bleed    Cecal polyp    Sebaceous cyst    Acute respiratory failure with hypoxemia ([facility])    Influenza A    Generalized weakness    Essential hypertension    Spastic quadriplegic cerebral palsy ([facility])       Plan:   1. Overall the patient has improved. 2. Inc. Activity.   Roger Cohen MD   4/25/2022  11:51 AM

## 2022-04-25 NOTE — FLOWSHEET NOTE
[x] daily progress note       [] discharge       Patient Name:  Bernard Cisneros   :  1965 MRN: [id number]  Room:  [address] [place] [address] Date of Admission: 2022  Rehabilitation Diagnosis:   Acute respiratory failure with hypoxia [J96.01]  Acute respiratory failure with hypoxemia (HCC) [J96.01]       Date 2022       Day of ARU Week:  5   Time IN/OUT 6650-9722   Individual Tx Minutes 60   TOTAL Tx Time Mins 60   Restrictions Restrictions/Precautions  Restrictions/Precautions: Fall Risk,Seizure,General Precautions (Droplet precautions; R hand contracture, CP)  Position Activity Restriction  Other position/activity restrictions: monitor O2   Communication with other providers: [x]   OK to see per nursing:     []   Spoke with team member regarding:      Subjective observations and cognitive status: Pt seen sitting up in recliner at beginning of treatment. Agreeable to therapy. Pt on 3 L of O2. Pt's O2 sats were 96% Placed patient on 2 L of O2 and O2 consistently at 96%    Pain level/location: 0/10         Discharge recommendations  Anticipated discharge date: TBD  Destination: []?home alone   []?home alone with assist PRN     []? home w/ family      []? Continuous supervision  []? SNF    []? Assisted living     []? Other:   Continued therapy: []?C PT  []? OUTPATIENT  PT   []? No Further PT  Equipment needs:TBD     Bed Mobility:           [x]   Pt received out of bed    Rolling R/L:  Independent  Scooting:  Independent   Lying --> Sit:  Independent   Sit --> lying:  Independent  Pt practiced in regular, flat bed without rails     Transfers:    Sit--> Stand:  SBA   Stand --> Sit:   SBA  Chair-->Bed/Bed --> Chair:   SBA   Assistive device required for transfer:  RW  Max vcs for proper hand placement. Gait:    Distance: 276'  Assistance:  SBA   Device:  RW  Gait Quality:  Vcs to stay closer to walker.      Stairs   # Completed: 13  Assistance: SBA  Supportive Device: 2 rails     Uneven Surfaces: Assistance:    CGA  Device:    RW  Surfaces Completed:   [x]  Carpeted Surface with bean bags beneath      []  Throw rugs       []  Ramp       []  Outdoor pavements        []  Grass             []  Loose gravel        []  Other:      Vcs to stay closer to walker. Pt amb up/down 4\" curb step CGA with RW. Vcs for walker safety. Additional Therapeutic activities/exercises completed this date:     []   Nu-step:  Time:        Level:         #Steps:       []   Rebounder:    []  Seated     []  Standing        []   Balance training         []   Postural training    []   Supine ther ex (reps/sets):     []   Seated ther ex (reps/sets):     []   Standing ther ex (reps/sets):     [x]   Picking up object from floor (standing): SBA with RW                  [x]   Reacher used    []   Other:   []   Other:    Patient/Caregiver Education and Training:   [x]   Bed Mobility/Transfer technique/safety  [x]   Gait technique/sequencing  [x]   Proper use of assistive device  [x]   Advanced mobility safety and technique  []   Reinforced patient's precautions/mobility while maintaining precautions  []   Postural awareness  []   Family training    Treatment Plan for Next Session: gait; transfers; advanced gait; stair training; exercises      Assessment: This pt demonstrated a positive response to today's treatment as evidenced by improved mobility. The patient is making progress toward established goals as evidenced by QI scores. Ongoing deficits are observed in the areas of strength, balance, endurance, and safety and continued focus on this is recommended.      Treatment/Activity Tolerance:   [x] Tolerated treatment with no adverse effects    [] Patient limited by fatigue  [] Patient limited by pain   [] Patient limited by medical complications:    [] Adverse reaction to Tx:   [] Significant change in status    Safety:       []  bed alarm set    [x]  chair alarm set    []  Pt refused alarms                []  Telesitter activated [x]  Gait belt used during tx session      [x]other: pt left sitting up in recliner with call light at end of treatment. Number of Minutes/Billable Intervention  Gait Training 30   Therapeutic Exercise 15   Neuro Re-Ed    Therapeutic Activity 15   Wheelchair Propulsion    Group    Other:    TOTAL 60         Social History  Social/Functional History  Lives With: Other (comment) (Pt lives in a group home.)  Type of Home: Assisted living  Home Layout: One level  Home Access: Level entry  Bathroom Shower/Tub: Walk-in shower,Shower chair with back  Bathroom Toilet: Standard  Bathroom Equipment: Shower chair,Grab bars in shower,Grab bars around [employer]  Bathroom Accessibility: [facility]:  (No AD)  Has the patient had two or more falls in the past year or any fall with injury in the past year?: Yes (Pt reports 4-5 falls in the last year c no significant injuries.)  Receives Help From: Personal care attendant (Group home staff cook meals and completes majority of the housework.)  ADL Assistance: [address] [place] [address] Avenue: Independent  Homemaking Responsibilities: Yes  Laundry Responsibility: Primary  Cleaning Responsibility: Secondary (Pt reports he cleans his own dishes.)  Ambulation Assistance: Independent  Transfer Assistance: Independent  Active : No  Patient's  Info: DD [facility] Transportation: [employer]  Occupation: Part time employment  Type of Occupation: [employer] - , 4 days a week [address]: Pt enjoys watching TV/sports and playing cards. Additional Comments: Pt reports he has a flat bed at home. Objective                                                                                    Goals:  (Update in navigator)   :   Long Term Goals  Time Frame for Long term goals : 10 tx days:  Long term goal 1: Pt will complete bed mobility (scooting, rolling R/L, and sup<->sit) Ind.  Long term goal 2: Pt will complete OOB transfers Mod Ind-Ind. Long term goal 3: Pt will ambulate 150 ft over level surface using LRAD prn Mod Ind-Ind. Long term goal 4: Pt will ambulate at least 10 ft of uneven surface and ascend/descend curb step using LRAD with SBA-Sup. Long term goal 5: Pt will ascend/descend 1 flight of stairs using railings with SBA. Long term goal 6: Pt will complete object retrieval from the floor using reacher Mod Ind.:        Plan of Care                                                                              Times per week: 5 days per week for a minimum of 60 minutes/day plus group as appropriate for 60 minutes.   Treatment to include Current Treatment Recommendations: Strengthening,Endurance training,Safety education & training,Balance training,Functional mobility training,Transfer training,Gait training,Stair training,Return to work related activity,Home exercise program,Patient/Caregiver education & training,Equipment evaluation, education, & procurement,Therapeutic activities    Electronically signed by   Mauro Smith, [id number]  4/25/2022, 1:05 PM

## 2022-04-25 NOTE — PROGRESS NOTES
Physical Rehabilitation: OCCUPATIONAL THERAPY     [x] daily progress note       [] discharge       Patient Name:  Dayan Brady   :  1965 MRN: [id number]  Room:  [address] [place], [place] [address] Date of Admission: 2022  Rehabilitation Diagnosis:   Acute respiratory failure with hypoxia [J96.01]  Acute respiratory failure with hypoxemia (HCC) [J96.01]       Date 2022       Day of ARU Week:  5   Time IN//1000; 1400/1420   Individual Tx Minutes 100 + 20   Group Tx Minutes    Co-Treat Minutes    Concurrent Tx Minutes    TOTAL Tx Time Mins 120   Variance Time    Variance Time []   Refusal due to:     []   Medical hold/reason:    []   Illness   []   Off Unit for test/procedure  []   Extra time needed to complete task  []   Therapeutic need  []   Other (specify):   Restrictions Restrictions/Precautions: Fall Risk,Seizure,General Precautions (Droplet precautions; R hand contracture, CP)         Communication with other providers: [x]   OK to see per nursing:     []   Spoke with team member regarding:      Subjective observations and cognitive status: Patient up in semi fowlers upon approach pleasant and agreeable to therapy      Pain level/location:    /10       Location: no pain noted per patient    Discharge recommendations  Anticipated discharge date:  TBD  Destination: []home alone   []home alone w assist prn   [] home w/ family    [] Continuous supervision       []SNF    [] Assisted living     [x] Other: Group home    Continued therapy: [x]HHC OT  []OUTPATIENT  OT   [] No Further OT  Equipment needs: None        ADLs:    Eating: Eating  Assistance Needed: Independent  Comment: per patient able to complete independently  CARE Score: 6  Discharge Goal: Independent       Oral Hygiene: Oral Hygiene  Assistance Needed: Independent  Comment: X  CARE Score: 6  Discharge Goal: Independent     GROOMING: Shaving, patient required Min A to maneuver electric med shaver around mustache area, patient stands for approx 3-4 minutes during task     UB/LB Bathing: Shower/Bathe Self  Assistance Needed: Supervision or touching assistance  Comment: SBA in stance, seated majority of shower  CARE Score: 4  Discharge Goal: Independent    UB Dressing: Upper Body Dressing  Assistance Needed: Setup or clean-up assistance  Comment: Set up  CARE Score: 5  Discharge Goal: Independent         LB Dressing: Lower Body Dressing  Assistance Needed: Supervision or touching assistance  Comment: SBA in stance managing over hips, threads independently using figure four method  CARE Score: 4  Discharge Goal: Independent    Donning and Reinholds Footwear: Putting [address] Needed: Setup or clean-up assistance  Comment: Set up uses figure four manages both socks and shoes  CARE Score: 5  Discharge Goal: Independent      Toiletin [address] needed: Supervision or touching assistance  Comment: SBA in stance managing  pants, completes hygiene  seated  CARE Score: 4  Discharge Goal: Independent      Toilet Transfers:     Toilet Transfer  Assistance needed: Supervision or touching assistance  Comment: SB/CGA  CARE Score: 4  Discharge Goal: Independent  Device Used:    [x]   Standard Toilet         [x]   Grab Bars           []  Bedside Commode       []   Elevated Toilet          []   Other:        Bed Mobility:           []   Pt received out of bed   Rolling R/L:    Scooting:  SBA  Supine --> Sit:  SBA  Sit --> Supine:      Transfers:    Sit--> Stand:  SBA/CG  Stand --> Sit:   SBA  Stand-Pivot:   SB/CGA  Other:    Assistive device required for transfer:   Grab bars, table/countertop, RW       Functional Mobility:  Throughout room/bathroom; PM SESSION:  Down hallway of ARU   Assistance:  SB/CGA, SB/CGA   Device:   [x]   Rolling Walker     []   Standard Walker []   Wheelchair        []   [employer]       []   4-Wheeled Mardee Shallow         []   Cardiac Walker       []   Other:          Additional Therapeutic activities/exercises completed this date:     [x]   ADL Training   [x]   Balance/Postural training     [x]   Bed/Transfer Training   [x]   Endurance Training patient instructed in bilateral reciprocal patterned movement for 12 minutes while seated to increase strength and endurance to facilitate ADL and mobility tasks    []   Neuromuscular Re-ed   []   Nu-step:  Time:        Level:         #Steps:       []   Rebounder:    []  Seated     []  Standing        []   Supine Ther Ex (reps/sets):     []   Seated Ther Ex (reps/sets):     []   Standing Ther Ex (reps/sets):     []   Other:      Comments: patient on  2L of O2 O2 stats remain  Mid  to high nineties throughout session, on room air patients O2 remains   Throughout endurance task      Patient/Caregiver Education and Training:   [x]   Adaptive Equipment Use  [x]   Bed Mobility/Transfer Technique/Safety  [x]   Energy Conservation Tips  []   Family training  [x]   Postural Awareness  [x]   Safety During Functional Activities  []   Reinforced Patient's Precautions   []   Progress was updated and reviewed in Rehabtracker with patient and/or family this         date.     Treatment Plan for Next Session: POC to continue as tolerated, standing tolerance,  Dynamic balance      Treatment/Activity Tolerance:   [x] Tolerated treatment with no adverse effects    [] Patient limited by fatigue  [] Patient limited by pain   [] Patient limited by medical complications:    [] Adverse reaction to Tx:   [] Significant change in status    Safety:       []  bed alarm set    [x]  chair alarm set    []  Pt refused alarms                []  Telesitter activated      [x]  Gait belt used during tx session      []other:       Number of Minutes/Billable Intervention  Therapeutic Exercise    ADL Self-care 60   Neuro Re-Ed    Therapeutic Activity 40+ 20   Group    Other:    TOTAL 120       Social History  Social/Functional History  Lives With: Other (comment) (Pt lives in a group home.)  Type of Home: Assisted living  Home Layout: One level  Home Access: Level entry  Bathroom Shower/Tub: Walk-in shower,Shower chair with back  Bathroom Toilet: Standard  Bathroom Equipment: Shower chair,Grab bars in shower,Grab bars around [place] [employer]  Bathroom Accessibility: L[facility] Hollow[place]:  (No AD)  Has the patient had two or more falls in the past year or any fall with injury in the past year?: Yes (Pt reports 4-5 falls in the last year c no significant injuries.)  Receives Help From: Personal care attendant (Group home staff cook meals and completes majority of the housework.)  ADL Assistance: [facility] [address]: Independent  Homemaking Responsibilities: Yes  Laundry Responsibility: Primary  Cleaning Responsibility: Secondary (Pt reports he cleans his own dishes.)  Ambulation Assistance: Independent  Transfer Assistance: Independent  Active : No  Patient's  Info: DD services Verlene Ida of Transportation: [employer]  Occupation: Part time employment  Type of Occupation: [employer], 4 days a week 3hrs  Leisure & Hobbies: Pt enjoys watching TV/sports and playing cards. Additional Comments: Pt reports he has a flat bed at home. Objective                                                                                    Goals:  (Update in navigator)  Short Term Goals  Time Frame for Short term goals: STGs=LTGs:  Long Term Goals  Time Frame for Long term goals : 7-10 days or until d/c. Long Term Goal 1: Pt will complete self-feeding c Ind. Long Term Goal 2: Pt will complete grooming tasks c Mod I.  Long Term Goal 3: Pt will complete total body bathing c Mod I.  Long Term Goal 4: Pt will complete UB dressing c Ind. Long Term Goal 5: Pt will complete LB dressing c Mod I.   Additional Goals?: Yes  Long Term Goal 6: Pt will doff/don footwear c Mod I.  Long Term Goal 7: Pt will complete toileting c Mod I.  Long Term Goal 8: Pt will perform functional transfers (bed, chair, toilet, shower) c DME PRN and Mod I.  Long Term Goal 9: Pt will perform therex/therax to facilitate increased strength/endurance/ax tolerance (c emphasis on dynamic standing balance/tolerance > 8 mins) c Mod I.  Long Term Goal 10: Pt will complete home management tasks c Mod I.:        Plan of Care                                                                              Times per week: 5 days per week for a minimum of 60 minutes/day plus group as appropriate for 60 minutes.   Treatment to include Plan  Times per Day: Daily  Current Treatment Recommendations: Strengthening,Balance training,Functional mobility training,Endurance training,Neuromuscular re-education,Safety education & training,Patient/Caregiver education & training,Equipment evaluation, education, & procurement,Self-Care / ADL,Home management training    Electronically signed by   ITALO Peter,  4/25/2022, 9:48 AM

## 2022-04-26 PROCEDURE — 94150 VITAL CAPACITY TEST: CPT

## 2022-04-26 PROCEDURE — 6360000002 HC RX W HCPCS: Performed by: INTERNAL MEDICINE

## 2022-04-26 PROCEDURE — 94640 AIRWAY INHALATION TREATMENT: CPT

## 2022-04-26 PROCEDURE — 97116 GAIT TRAINING THERAPY: CPT

## 2022-04-26 PROCEDURE — 2700000000 HC OXYGEN THERAPY PER DAY

## 2022-04-26 PROCEDURE — 6370000000 HC RX 637 (ALT 250 FOR IP): Performed by: PHYSICAL MEDICINE & REHABILITATION

## 2022-04-26 PROCEDURE — 97535 SELF CARE MNGMENT TRAINING: CPT

## 2022-04-26 PROCEDURE — 94664 DEMO&/EVAL PT USE INHALER: CPT

## 2022-04-26 PROCEDURE — 1280000000 HC REHAB R&B

## 2022-04-26 PROCEDURE — 94761 N-INVAS EAR/PLS OXIMETRY MLT: CPT

## 2022-04-26 PROCEDURE — 6370000000 HC RX 637 (ALT 250 FOR IP): Performed by: INTERNAL MEDICINE

## 2022-04-26 PROCEDURE — 97530 THERAPEUTIC ACTIVITIES: CPT

## 2022-04-26 PROCEDURE — 97110 THERAPEUTIC EXERCISES: CPT

## 2022-04-26 PROCEDURE — 99233 SBSQ HOSP IP/OBS HIGH 50: CPT | Performed by: PHYSICAL MEDICINE & REHABILITATION

## 2022-04-26 RX ADMIN — LEVOTHYROXINE SODIUM 25 MCG: 0.05 TABLET ORAL at 05:39

## 2022-04-26 RX ADMIN — GUAIFENESIN 600 MG: 600 TABLET, EXTENDED RELEASE ORAL at 20:08

## 2022-04-26 RX ADMIN — PAROXETINE HYDROCHLORIDE 40 MG: 20 TABLET, FILM COATED ORAL at 08:53

## 2022-04-26 RX ADMIN — GUAIFENESIN 600 MG: 600 TABLET, EXTENDED RELEASE ORAL at 08:53

## 2022-04-26 RX ADMIN — ENOXAPARIN SODIUM 40 MG: 100 INJECTION SUBCUTANEOUS at 08:53

## 2022-04-26 RX ADMIN — IPRATROPIUM BROMIDE AND ALBUTEROL SULFATE 1 AMPULE: .5; 2.5 SOLUTION RESPIRATORY (INHALATION) at 16:00

## 2022-04-26 RX ADMIN — BENZONATATE 100 MG: 100 CAPSULE ORAL at 20:07

## 2022-04-26 RX ADMIN — LEVETIRACETAM 750 MG: 500 TABLET, FILM COATED ORAL at 08:53

## 2022-04-26 RX ADMIN — AMLODIPINE BESYLATE 10 MG: 10 TABLET ORAL at 08:53

## 2022-04-26 RX ADMIN — BENZONATATE 100 MG: 100 CAPSULE ORAL at 08:53

## 2022-04-26 RX ADMIN — LEVETIRACETAM 750 MG: 500 TABLET, FILM COATED ORAL at 20:07

## 2022-04-26 RX ADMIN — IPRATROPIUM BROMIDE AND ALBUTEROL SULFATE 1 AMPULE: .5; 2.5 SOLUTION RESPIRATORY (INHALATION) at 07:21

## 2022-04-26 RX ADMIN — BENZONATATE 100 MG: 100 CAPSULE ORAL at 14:00

## 2022-04-26 ASSESSMENT — PAIN SCALES - GENERAL
PAINLEVEL_OUTOF10: 0

## 2022-04-26 NOTE — FLOWSHEET NOTE
[x] daily progress note       [] discharge       Patient Name:  Gordo Gordon   :  1965 MRN: [id number]  Room:  [address] [place], [place] [address] Date of Admission: 2022  Rehabilitation Diagnosis:   Acute respiratory failure with hypoxia [J96.01]  Acute respiratory failure with hypoxemia (HCC) [J96.01]       Date 2022       Day of ARU Week:  6   Time IN/OUT 4231-1089  6580-5598   Individual Tx Minutes 120   TOTAL Tx Time Mins 120   Restrictions Restrictions/Precautions  Restrictions/Precautions: Fall Risk,Seizure,General Precautions (Droplet precautions; R hand contracture, CP)  Position Activity Restriction  Other position/activity restrictions: monitor O2   Communication with other providers: [x]   OK to see per nursing:     []   Spoke with team member regarding:      Subjective observations and cognitive status: AM session: pt seen sitting up in recliner at beginning of treatment. Agreeable to therapy. PM session: pt seen sitting up in recliner at beginning of treatment. Agreeable to therapy. Pain level/location: 0/10          Discharge recommendations  Anticipated discharge date: TBD  Destination: []??home alone   []? ?home alone with assist PRN     []? ? home w/ family      []? ? Continuous supervision  []? ?SNF    []? ? Assisted living     []? ? Other:   Continued therapy: []??[facility] PT  []??OUTPATIENT  PT   []? ? No Further PT  Equipment needs:TBD      [x]   Pt received out of bed     Transfers:    Sit--> Stand:  SBA  Stand --> Sit:   SBA  Chair-->Bed/Bed --> Chair:  SBA  Assistive device required for transfer:  RW  Vcs for proper hand placement.      Gait:    Distance:  AM session: 520'+152'+619'; PM session: 777'+34'   Assistance: SBA  Device: RW  Gait Quality: reciprocal pattern      Additional Therapeutic activities/exercises completed this date:     [x]   Nu-step: PM session:  Time:  10 minutes      Level: 3        #Steps: 417 (for strengthening and endurance training)       []   Rebounder:    []  Seated []  Standing        []   Balance training         []   Postural training    []   Supine ther ex (reps/sets):     []   Seated ther ex (reps/sets):     [x]   Standing ther ex (reps/sets): AM session: marching, hip abduction, knee flexion x 10 reps each for strengthening. PM session: heel raises, toe raises, mini-squats, hip extension x 10 reps each for strengthening. []   Picking up object from floor (standing):                   []   Reacher used   []   Other:   []   Other:    Patient/Caregiver Education and Training:   [x]   Transfer technique/safety  [x]   Gait technique/sequencing  [x]   Proper use of assistive device  []   Advanced mobility safety and technique  []   Reinforced patient's precautions/mobility while maintaining precautions  []   Postural awareness  []   Family training    Treatment Plan for Next Session: gait; transfers; advanced gait; stair training; exercises; balance training       Assessment: This pt demonstrated a positive response to today's treatment as evidenced by improved mobility. The patient is making progress toward established goals as evidenced by QI scores. Ongoing deficits are observed in the areas of strength, balance, endurance, and safety and continued focus on this is recommended. Treatment/Activity Tolerance:   [x] Tolerated treatment with no adverse effects    [] Patient limited by fatigue  [] Patient limited by pain   [] Patient limited by medical complications:    [] Adverse reaction to Tx:   [] Significant change in status    Safety:       []  bed alarm set    [x]  chair alarm set    []  Pt refused alarms                []  Telesitter activated      [x]  Gait belt used during tx session      [x]other: After AM session: pt left sitting up in recliner with call light at end of treatment. After PM session: pt left sitting up in recliner with call light at end of treatment.        Number of Minutes/Billable Intervention  Gait Training 60   Therapeutic Exercise 45 Neuro Re-Ed    Therapeutic Activity 15   Wheelchair Propulsion    Group    Other:    TOTAL 120         Social History  Social/Functional History  Lives With: Other (comment) (Pt lives in a group home.)  Type of Home: Assisted living  Home Layout: One level  Home Access: Level entry  Bathroom Shower/Tub: Walk-in shower,Shower chair with back  Bathroom Toilet: Standard  Bathroom Equipment: Shower chair,Grab bars in shower,Grab bars around [facility]  Bathroom Accessibility: Accessible  Home Equipment:  (No AD)  Has the patient had two or more falls in the past year or any fall with injury in the past year?: Yes (Pt reports 4-5 falls in the last year c no significant injuries.)  Receives Help From: Personal care attendant (Group home staff cook meals and completes majority of the housework.)  ADL Assistance: [address] [place] [address] Avenue: Independent  Homemaking Responsibilities: Yes  Laundry Responsibility: Primary  Cleaning Responsibility: Secondary (Pt reports he cleans his own dishes.)  Ambulation Assistance: Independent  Transfer Assistance: Independent  Active : No  Patient's  Info: DD [facility] of Transportation: [facility]  Occupation: Part time employment  Type of Occupation: [employer], 4 days a week 3hrs  Leisure & Hobbies: Pt enjoys watching TV/sports and playing cards. Additional Comments: Pt reports he has a flat bed at home. Objective                                                                                    Goals:  (Update in navigator)   : Long Term Goals  Time Frame for Long term goals : 10 tx days:  Long term goal 1: Pt will complete bed mobility (scooting, rolling R/L, and sup<->sit) Ind.  Long term goal 2: Pt will complete OOB transfers Mod Ind-Ind. Long term goal 3: Pt will ambulate 150 ft over level surface using LRAD prn Mod Ind-Ind.   Long term goal 4: Pt will ambulate at least 10 ft of uneven surface and ascend/descend curb step using LRAD with SBA-Sup. Long term goal 5: Pt will ascend/descend 1 flight of stairs using railings with SBA. Long term goal 6: Pt will complete object retrieval from the floor using reacher Mod Ind.:        Plan of Care                                                                              Times per week: 5 days per week for a minimum of 60 minutes/day plus group as appropriate for 60 minutes.   Treatment to include Current Treatment Recommendations: Strengthening,Endurance training,Safety education & training,Balance training,Functional mobility training,Transfer training,Gait training,Stair training,Return to work related activity,Home exercise program,Patient/Caregiver education & training,Equipment evaluation, education, & procurement,Therapeutic activities    Electronically signed by   Wayne Villarreal, [id number]  4/26/2022, 10:05 AM

## 2022-04-26 NOTE — PROGRESS NOTES
Pulmonary and Critical Care  Progress Note    Subjective: The patient is doing well,having a bath. Shortness of breath none. Chest pain none. Addressing respiratory complaints Patient is negative for  hemoptysis and cyanosis. CONSTITUTIONAL:  negative for fevers and chills. Past Medical History:     has a past medical history of Cerebral palsy ([facility] [address] Depression, Hypertension, and Seizures ([facility] [address] has a past surgical history that includes Eye surgery; Abdomen surgery; and Upper gastrointestinal endoscopy (N/A, 12/3/2019). reports that he has never smoked. He has never used smokeless tobacco. He reports that he does not drink alcohol and does not use drugs. Family history:  family history is not on file. Allergies   Allergen Reactions    Pcn [Penicillins]      Social History:    Reviewed; no changes    Objective:   PHYSICAL EXAM:        VITALS:  /80   Pulse 80   Temp 97.4 °F (36.3 °C) (Oral)   Resp 16   Ht 5' 7\" (1.702 m)   Wt 167 lb 12.3 oz (76.1 kg)   SpO2 93%   BMI 26.28 kg/m²     24HR INTAKE/OUTPUT:      Intake/Output Summary (Last 24 hours) at 4/26/2022 1005  Last data filed at 4/25/2022 2003  Gross per 24 hour   Intake 358 ml   Output 350 ml   Net 8 ml       CONSTITUTIONAL:  Awake. LUNGS:  decreased breath sounds, occ basilar crackles. CARDIOVASCULAR:  normal S1 and S2 and negative JVD  ABD:Abdomen soft, non-tender. BS normal. No masses,  No organomegaly. Luda Spark. DATA:    CBC:  No results for input(s): WBC, RBC, HGB, HCT, PLT, MCV, MCH, MCHC, RDW, NRBC, SEGSPCT, BANDSPCT in the last 72 hours. BMP:  No results for input(s): NA, K, CL, CO2, BUN, CREATININE, CALCIUM, GLUCOSE in the last 72 hours. ABG:  No results for input(s): PH, PO2ART, FLJ3IGL, HCO3, BEART, O2SAT in the last 72 hours.   Lab Results   Component Value Date    PROBNP 25.54 04/19/2022    PROBNP 26.35 04/17/2022    PROBNP 35.21 06/08/2021     No results found for: [address] [facility]    Radiology Review:  Pertinent images / reports were reviewed as a part of this visit. Assessment:     Patient Active Problem List   Diagnosis    Pneumonia    Cerebral palsy, hemiplegic (HCC)    Hypoxia    Seizure disorder ([address]    Gait disturbance    Pneumonia of both lungs due to infectious organism    Acute upper GI bleed    Cecal polyp    Sebaceous cyst    Acute respiratory failure with hypoxemia (HCC)    Influenza A    Generalized weakness    Essential hypertension    Spastic quadriplegic cerebral palsy ([address]       Plan:   1. Overall the patient is better. 2. Inc. Activity.   [facility], [place]   4/26/2022  10:05 AM

## 2022-04-26 NOTE — PROGRESS NOTES
Physical Rehabilitation: OCCUPATIONAL THERAPY     [x] daily progress note       [] discharge       Patient Name:  Gordo Gordon   :  1965 MRN: [id number]  Room:  [address] [place], [place] [address] Date of Admission: 2022  Rehabilitation Diagnosis:   Acute respiratory failure with hypoxia [J96.01]  Acute respiratory failure with hypoxemia (HCC) [J96.01]       Date 2022       Day of ARU Week:  6   Time IN/OUT 1115/1215   Individual Tx Minutes 60   Group Tx Minutes    Co-Treat Minutes    Concurrent Tx Minutes    TOTAL Tx Time Mins 60   Variance Time    Variance Time []   Refusal due to:     []   Medical hold/reason:    []   Illness   []   Off Unit for test/procedure  []   Extra time needed to complete task  []   Therapeutic need  []   Other (specify):   Restrictions Restrictions/Precautions: Fall Risk,Seizure,General Precautions (Droplet precautions; R hand contracture, CP)         Communication with other providers: [x]   OK to see per nursing:     []   Spoke with team member regarding:      Subjective observations and cognitive status: Patient up in recliner pleasant and agreeable to therapy       Pain level/location:    /10       Location: per patient no pain noted    Discharge recommendations  Anticipated discharge date:  TBD  Destination: []home alone   []home alone w assist prn   [] home w/ family    [] Continuous supervision       []SNF    [] Assisted living     [x] Other:group home    Continued therapy: [x]HHC OT  []OUTPATIENT  OT   [] No Further OT  Equipment needs: None        ADLs:  Toiletin [address] needed: Supervision or touching assistance  Comment: Sup for safety in stance, comepletes hygiene seated following BM  CARE Score: 4  Discharge Goal: Independent      Toilet Transfers:     Toilet Transfer  Assistance needed: Supervision or touching assistance  Comment: Sup/SBA  CARE Score: 4  Discharge Goal: Independent  Device Used:    [x]   Standard Toilet         [x]   [facility] []  Bedside Commode       []   Elevated Toilet          []   Other:        Bed Mobility:           [x]   Pt received out of bed   Rolling R/L:    Scooting:    Supine --> Sit:    Sit --> Supine:      Transfers:    Sit--> Stand:  Sup/SB  Stand --> Sit: Sup/SB   Stand-Pivot:   SBA  Other:    Assistive device required for transfer:   RW      Functional Mobility:  Throughout room/bathroom, down hallway of ARU   Assistance:  SBA  Device:   [x]   Rolling Walker     []   Standard Walker []   Wheelchair        []   [employer]       []   4-Wheeled Walker         []   Cardiac Walker       []   Other:          Additional Therapeutic activities/exercises completed this date:     [x]   ADL Training   [x]   Balance/Postural training Patient instructed in dynamic standing balance/tolerance with card activity c focus on crossing midline and reaching outside of GILBERTO with good safety, and tolerance to stand to facilitate ADL and IADL tasks upon dc.   Patient stands for 12 minutes with no seated rest break during task     []   Bed/Transfer Training   [x]   Endurance Training patient instructed in bilateral reciprocal patterned movement for 15 minutes with rest break at 5 minute graeme while standing completes last 10 minutes seated, with min resistance to increase activity tolerance for ADL/MObility tasks at community reentry level upon dc to group home     []   Neuromuscular Re-ed   []   Nu-step:  Time:        Level:         #Steps:       []   Rebounder:    []  Seated     []  Standing        []   Supine Ther Ex (reps/sets):     []   Seated Ther Ex (reps/sets):     []   Standing Ther Ex (reps/sets):     []   Other:      Comments:      Patient/Caregiver Education and Training:   [x]   [employer] Equipment Use  [x]   Bed Mobility/Transfer Technique/Safety  [x]   Energy Conservation Tips  []   Family training  [x]   Postural Awareness  [x]   Safety During Functional Activities  []   Reinforced Patient's Precautions   []   Progress was updated and reviewed in Rehabtracker with patient and/or family this         date.     Treatment Plan for Next Session: POC to continue as tolerated             Treatment/Activity Tolerance:   [x] Tolerated treatment with no adverse effects    [] Patient limited by fatigue  [] Patient limited by pain   [] Patient limited by medical complications:    [] Adverse reaction to Tx:   [] Significant change in status    Safety:       []  bed alarm set    [x]  chair alarm set    []  Pt refused alarms                []  Telesitter activated      [x]  Gait belt used during tx session      []other:       Number of Minutes/Billable Intervention  Therapeutic Exercise    ADL Self-care 15   Neuro Re-Ed    Therapeutic Activity 45   Group    Other:    TOTAL 60       Social History  Social/Functional History  Lives With: Other (comment) (Pt lives in a group home.)  Type of Home: Assisted living  Home Layout: One level  Home Access: Level entry  Bathroom Shower/Tub: Walk-in shower,Shower chair with back  Bathroom Toilet: Standard  Bathroom Equipment: Shower chair,Grab bars in shower,Grab bars around [place] [employer] [facility]  Bathroom Accessibility: Accessible  Home Equipment:  (No AD)  Has the patient had two or more falls in the past year or any fall with injury in the past year?: Yes (Pt reports 4-5 falls in the last year c no significant injuries.)  Receives Help From: Personal care attendant (Group home staff cook meals and completes majority of the housework.)  ADL Assistance: [address] [place] [address] Avenue: Independent  Homemaking Responsibilities: Yes  Laundry Responsibility: Primary  Cleaning Responsibility: Secondary (Pt reports he cleans his own dishes.)  Ambulation Assistance: Independent  Transfer Assistance: Independent  Active : No  Patient's  Info: CL[employer]ICE services Marce Donis of Transportation: Michelle Vivar  Occupation: Part time employment  Type of Occupation: [employer], 4 days a week [address]: Pt enjoys watching TV/sports and playing cards. Additional Comments: Pt reports he has a flat bed at home. Objective                                                                                    Goals:  (Update in navigator)  Short Term Goals  Time Frame for Short term goals: STGs=LTGs:  Long Term Goals  Time Frame for Long term goals : 7-10 days or until d/c. Long Term Goal 1: Pt will complete self-feeding c Ind. Long Term Goal 2: Pt will complete grooming tasks c Mod I.  Long Term Goal 3: Pt will complete total body bathing c Mod I.  Long Term Goal 4: Pt will complete UB dressing c Ind. Long Term Goal 5: Pt will complete LB dressing c Mod I. Additional Goals?: Yes  Long Term Goal 6: Pt will doff/don footwear c Mod I.  Long Term Goal 7: Pt will complete toileting c Mod I.  Long Term Goal 8: Pt will perform functional transfers (bed, chair, toilet, shower) c DME PRN and Mod I.  Long Term Goal 9: Pt will perform therex/therax to facilitate increased strength/endurance/ax tolerance (c emphasis on dynamic standing balance/tolerance > 8 mins) c Mod I.  Long Term Goal 10: Pt will complete home management tasks c Mod I.:        Plan of Care                                                                              Times per week: 5 days per week for a minimum of 60 minutes/day plus group as appropriate for 60 minutes.   Treatment to include Plan  Times per Day: Daily  Current Treatment Recommendations: Strengthening,Balance training,Functional mobility training,Endurance training,Neuromuscular re-education,Safety education & training,Patient/Caregiver education & training,Equipment evaluation, education, & procurement,Self-Care / ADL,Home management training    Electronically signed by   Shellia Sandifer, OTA,  4/26/2022, 11:52 AM

## 2022-04-27 PROCEDURE — 97530 THERAPEUTIC ACTIVITIES: CPT

## 2022-04-27 PROCEDURE — 99232 SBSQ HOSP IP/OBS MODERATE 35: CPT | Performed by: PHYSICAL MEDICINE & REHABILITATION

## 2022-04-27 PROCEDURE — 97150 GROUP THERAPEUTIC PROCEDURES: CPT

## 2022-04-27 PROCEDURE — 94761 N-INVAS EAR/PLS OXIMETRY MLT: CPT

## 2022-04-27 PROCEDURE — 94640 AIRWAY INHALATION TREATMENT: CPT

## 2022-04-27 PROCEDURE — 97535 SELF CARE MNGMENT TRAINING: CPT

## 2022-04-27 PROCEDURE — 94150 VITAL CAPACITY TEST: CPT

## 2022-04-27 PROCEDURE — 6370000000 HC RX 637 (ALT 250 FOR IP): Performed by: INTERNAL MEDICINE

## 2022-04-27 PROCEDURE — 1280000000 HC REHAB R&B

## 2022-04-27 PROCEDURE — 97110 THERAPEUTIC EXERCISES: CPT

## 2022-04-27 PROCEDURE — 2700000000 HC OXYGEN THERAPY PER DAY

## 2022-04-27 PROCEDURE — 6360000002 HC RX W HCPCS: Performed by: INTERNAL MEDICINE

## 2022-04-27 PROCEDURE — 97116 GAIT TRAINING THERAPY: CPT

## 2022-04-27 PROCEDURE — 6370000000 HC RX 637 (ALT 250 FOR IP): Performed by: PHYSICAL MEDICINE & REHABILITATION

## 2022-04-27 RX ADMIN — ENOXAPARIN SODIUM 40 MG: 100 INJECTION SUBCUTANEOUS at 09:48

## 2022-04-27 RX ADMIN — IPRATROPIUM BROMIDE AND ALBUTEROL SULFATE 1 AMPULE: .5; 2.5 SOLUTION RESPIRATORY (INHALATION) at 12:01

## 2022-04-27 RX ADMIN — LEVOTHYROXINE SODIUM 25 MCG: 0.05 TABLET ORAL at 06:48

## 2022-04-27 RX ADMIN — GUAIFENESIN 600 MG: 600 TABLET, EXTENDED RELEASE ORAL at 20:50

## 2022-04-27 RX ADMIN — PAROXETINE HYDROCHLORIDE 40 MG: 20 TABLET, FILM COATED ORAL at 09:48

## 2022-04-27 RX ADMIN — GUAIFENESIN 600 MG: 600 TABLET, EXTENDED RELEASE ORAL at 09:48

## 2022-04-27 RX ADMIN — LEVETIRACETAM 750 MG: 500 TABLET, FILM COATED ORAL at 20:49

## 2022-04-27 RX ADMIN — IPRATROPIUM BROMIDE AND ALBUTEROL SULFATE 1 AMPULE: .5; 2.5 SOLUTION RESPIRATORY (INHALATION) at 07:38

## 2022-04-27 RX ADMIN — BENZONATATE 100 MG: 100 CAPSULE ORAL at 14:06

## 2022-04-27 RX ADMIN — BENZONATATE 100 MG: 100 CAPSULE ORAL at 20:50

## 2022-04-27 RX ADMIN — IPRATROPIUM BROMIDE AND ALBUTEROL SULFATE 1 AMPULE: .5; 2.5 SOLUTION RESPIRATORY (INHALATION) at 20:50

## 2022-04-27 RX ADMIN — BENZONATATE 100 MG: 100 CAPSULE ORAL at 09:47

## 2022-04-27 RX ADMIN — LEVETIRACETAM 750 MG: 500 TABLET, FILM COATED ORAL at 09:47

## 2022-04-27 RX ADMIN — AMLODIPINE BESYLATE 10 MG: 10 TABLET ORAL at 09:48

## 2022-04-27 ASSESSMENT — PAIN SCALES - GENERAL: PAINLEVEL_OUTOF10: 0

## 2022-04-27 NOTE — PROGRESS NOTES
Comprehensive Nutrition Assessment    Type and Reason for Visit:  Reassess    Nutrition Recommendations/Plan:   1. Continue regular diet at this time   2. Encourage consistent intake   3. Will continue to follow up during stay     Malnutrition Assessment:  Malnutrition Status:  Insufficient data (04/22/22 1636)    Context:  Acute Illness       Nutrition Assessment:    Remains on regular diet at this time with meal intake %. Reasonable meal orders. More consistent intake this week, will follow at low nutrition risk at this time. Nutrition Related Findings:    out of room x 2 visits with therapy Wound Type: None       Current Nutrition Intake & Therapies:    Average Meal Intake: %  Average Supplements Intake: None Ordered  ADULT DIET; Regular    Anthropometric Measures:  Height: 5' 7\" (170.2 cm)  Ideal Body Weight (IBW): 148 lbs (67 kg)    Admission Body Weight: 142 lb 13.7 oz (64.8 kg)  Current Body Weight: 167 lb 12.3 oz (76.1 kg), 111.4 % IBW. Weight Source: Not Specified  Current BMI (kg/m2): 26.3  Usual Body Weight: 170 lb 10.2 oz (77.4 kg) (per hx last year)  % Weight Change (Calculated): -3.4  Weight Adjustment For: No Adjustment                 BMI Categories: Overweight (BMI 25.0-29. 9)    Estimated Daily Nutrient Needs:  Energy Requirements Based On: Kcal/kg  Weight Used for Energy Requirements: Current  Energy (kcal/day): 1273-5128  Weight Used for Protein Requirements: Current  Protein (g/day): 74-88 (1-1.2 g/kg)  Method Used for Fluid Requirements: 1 ml/kcal  Fluid (ml/day): 2200    Nutrition Diagnosis:   · Predicted inadequate energy intake related to other (comment) (reduced appetite in illness) as evidenced by other (comment) (inconsistent intake)      Nutrition Interventions:   Food and/or Nutrient Delivery: Continue Current Diet,Snacks (Comment)  Nutrition Education/Counseling: No recommendation at this time  Coordination of Nutrition Care: Continue to monitor while inpatient Goals:  Previous Goal Met: Progressing toward Goal(s)  Goals: PO intake 75% or greater       Nutrition Monitoring and Evaluation:   Behavioral-Environmental Outcomes: None Identified  Food/Nutrient Intake Outcomes: Food and Nutrient Intake  Physical Signs/Symptoms Outcomes: Biochemical Data,Meal Time Behavior,Skin,Weight,Nutrition Focused Physical Findings    Discharge Planning:    Continue current diet     Salima Parada RD, LD  Contact: [phone]

## 2022-04-27 NOTE — PROGRESS NOTES
Physical Rehabilitation: OCCUPATIONAL THERAPY     [x] daily progress note       [] discharge       Patient Name:  Tamiko Smith   :  1965 MRN: [id number]  Room:  [address] [place], [place] [address] Date of Admission: 2022  Rehabilitation Diagnosis:   Acute respiratory failure with hypoxia [J96.01]  Acute respiratory failure with hypoxemia (HCC) [J96.01]       Date 2022       Day of ARU Week:  7   Time IN//915   Individual Tx Minutes 60   Group Tx Minutes    Co-Treat Minutes    Concurrent Tx Minutes    TOTAL Tx Time Mins 60   Variance Time    Variance Time []   Refusal due to:     []   Medical hold/reason:    []   Illness   []   Off Unit for test/procedure  []   Extra time needed to complete task  []   Therapeutic need  []   Other (specify):   Restrictions Restrictions/Precautions: Fall Risk,Seizure,General Precautions (Droplet precautions; R hand contracture, CP)         Communication with other providers: [x]   OK to see per nursing:     []   Spoke with team member regarding:      Subjective observations and cognitive status: Patient supine in bed sleeping, easily awakened pleasant and agreeable to therapy    Pain level/location:    /10       Location: per patient no pain noted    Discharge recommendations  Anticipated discharge date:  5/3  Destination: []home alone   []home alone w assist prn   [] home w/ family    [] Continuous supervision       []SNF    [] Assisted living     [x] Other:Group home    Continued therapy: []HHC OT  []OUTPATIENT  OT   [] No Further OT  Equipment needs: None        ADLs:    Eating: Eating  Assistance Needed: Independent  Comment: X  CARE Score: 6  Discharge Goal: Independent       Oral Hygiene: Oral Hygiene  Assistance Needed: Independent  Comment:  Mod I seated sinkside  CARE Score: 6  Discharge Goal: Independent    UB/LB Bathing: Shower/Bathe Self  Assistance Needed: Supervision or touching assistance  Comment: Sup for safety in stance, seated ,ajority of shower  CARE Score: 4  Discharge Goal: Independent    UB Dressing: Upper Body Dressing  Assistance Needed: Independent  Comment: X  CARE Score: 6  Discharge Goal: Independent         LB Dressing: Lower Body Dressing  Assistance Needed: Supervision or touching assistance  Comment: Sup in stance managing pants over hips  CARE Score: 4  Discharge Goal: Independent    Donning and [place] Footwear: Putting On/Taking Off Footwear  Assistance Needed: Independent  Comment: Ind using no AE  CARE Score: 6  Discharge Goal: Independent      Toiletin [address] needed: Supervision or touching assistance  Comment: Sup  CARE Score: 4  Discharge Goal: Independent      Toilet Transfers: Toilet Transfer  Assistance needed: Supervision or touching assistance  Comment: Sup  CARE Score: 4  Discharge Goal: Independent  Device Used:    [x]   Standard Toilet         [x]   Grab Bars           []  Bedside Commode       []   Elevated Toilet          []   Other:        Bed Mobility:           []   Pt received out of bed   Rolling R/L:  Mod I   Scooting:   Mod I   Supine --> Sit:  Mod I   Sit --> Supine:      Transfers:    Sit--> Stand:  Sup  Stand --> Sit:   Sup  Stand-Pivot:   SBA  Other:    Assistive device required for transfer:   RW      Functional Mobility:  Throughout room/bathroom   Assistance:   Sup/SBA  Device:   [x]   Rolling Walker     []   Standard Walker []   Wheelchair        []   [employer]       []   4-Wheeled Estephania Poser         []   Cardiac Estephania Poser       []   Other:        Homemaking Tasks:   Patient retrieves clothing from closet educated on using RW to transport items to bathroom at [employer] level       Additional Therapeutic activities/exercises completed this date:     [x]   ADL Training   [x]   Balance/Postural training     [x]   Bed/Transfer Training   []   Endurance Training   []   Neuromuscular Re-ed   []   Nu-step:  Time:        Level:         #Steps:       []   Rebounder:    []  Seated     []  Standing        []   Supine Ther Ex (reps/sets):     []   Seated Ther Ex (reps/sets):     []   Standing Ther Ex (reps/sets):     []   Other:      Comments:  Patient on room air throughout therapy session/shower, O2 stats remain 92-94% for full therapy session; nursing notified patient left up in recliner without O2, patient educated on feeling SOB to place O2 back on and call nurse       Patient/Caregiver Education and Training:   [x]   Adaptive Equipment Use  [x]   Bed Mobility/Transfer Technique/Safety  [x]   Energy Conservation Tips  []   Family training  [x]   Postural Awareness  [x]   Safety During Functional Activities  []   Reinforced Patient's Precautions   []   Progress was updated and reviewed in Rehabtracker with patient and/or family this         date.     Treatment Plan for Next Session: POC to continue as tolerated         Treatment/Activity Tolerance:   [x] Tolerated treatment with no adverse effects    [] Patient limited by fatigue  [] Patient limited by pain   [] Patient limited by medical complications:    [] Adverse reaction to Tx:   [] Significant change in status    Safety:       []  bed alarm set    [x]  chair alarm set    []  Pt refused alarms                []  Telesitter activated      [x]  Gait belt used during tx session      []other:       Number of Minutes/Billable Intervention  Therapeutic Exercise    ADL Self-care 45   Neuro Re-Ed    Therapeutic Activity 15   Group    Other:    TOTAL 60       Social History  Social/Functional History  Lives With: Other (comment) (Pt lives in a group home.)  Type of Home: Assisted living  Home Layout: One level  Home Access: Level entry  Bathroom Shower/Tub: Walk-in shower,Shower chair with back  Bathroom Toilet: Standard  Bathroom Equipment: Shower chair,Grab bars in shower,Grab bars around [place] [employer] [facility]  Bathroom Accessibility: Accessible  Home Equipment:  (No AD)  Has the patient had two or more falls in the past year or any fall with injury in the past year?: Yes (Pt reports 4-5 falls in the last year c no significant injuries.)  Receives Help From: Personal care attendant (Group home staff cook meals and completes majority of the housework.)  ADL Assistance: [address]: Independent  Homemaking Responsibilities: Yes  Laundry Responsibility: Primary  Cleaning Responsibility: Secondary (Pt reports he cleans his own dishes.)  Ambulation Assistance: Independent  Transfer Assistance: Independent  Active : No  Patient's  Info: CLARICE services Anmol López of Transportation: [facility]  Occupation: Part time employment  Type of Occupation: [employer] 4 days a week 3hrs  Leisure & Hobbies: Pt enjoys watching TV/sports and playing cards. Additional Comments: Pt reports he has a flat bed at home. Objective                                                                                    Goals:  (Update in navigator)  Short Term Goals  Time Frame for Short term goals: STGs=LTGs:  Long Term Goals  Time Frame for Long term goals : 7-10 days or until d/c. Long Term Goal 1: Pt will complete self-feeding c Ind. Long Term Goal 2: Pt will complete grooming tasks c Mod I.  Long Term Goal 3: Pt will complete total body bathing c Mod I.  Long Term Goal 4: Pt will complete UB dressing c Ind. Long Term Goal 5: Pt will complete LB dressing c Mod I. Additional Goals?: Yes  Long Term Goal 6: Pt will doff/don footwear c Mod I.  Long Term Goal 7: Pt will complete toileting c Mod I.  Long Term Goal 8: Pt will perform functional transfers (bed, chair, toilet, shower) c DME PRN and Mod I.  Long Term Goal 9: Pt will perform therex/therax to facilitate increased strength/endurance/ax tolerance (c emphasis on dynamic standing balance/tolerance > 8 mins) c Mod I.  Long Term Goal 10:  Pt will complete home management tasks c Mod I.:        Plan of Care                                                                              Times per week: 5 days per week for a minimum of 60 minutes/day plus group as appropriate for 60 minutes.   Treatment to include Plan  Times per Day: Daily  Current Treatment Recommendations: Strengthening,Balance training,Functional mobility training,Endurance training,Neuromuscular re-education,Safety education & training,Patient/Caregiver education & training,Equipment evaluation, education, & procurement,Self-Care / ADL,Home management training    Electronically signed by   ITALO Sadler,  4/27/2022, 9:10 AM

## 2022-04-27 NOTE — PLAN OF CARE
Problem: Discharge Planning  Goal: Discharge to home or other facility with appropriate resources  4/27/2022 0949 by Pauline Groves RN  Outcome: Progressing  4/27/2022 0949 by Pauline Groves RN  Outcome: Progressing     Problem: Safety - Adult  Goal: Free from fall injury  4/27/2022 0949 by Pauline Groves RN  Outcome: Progressing  4/27/2022 0949 by Pauline Groves RN  Outcome: Progressing     Problem: ABCDS Injury Assessment  Goal: Absence of physical injury  4/27/2022 0949 by Pauline Groves RN  Outcome: Progressing  4/27/2022 0949 by Pauline Groves RN  Outcome: Progressing     Problem: Skin/Tissue Integrity  Goal: Absence of new skin breakdown  Description: 1. Monitor for areas of redness and/or skin breakdown  2. Assess vascular access sites hourly  3. Every 4-6 hours minimum:  Change oxygen saturation probe site  4. Every 4-6 hours:  If on nasal continuous positive airway pressure, respiratory therapy assess nares and determine need for appliance change or resting period.   4/27/2022 0949 by Pauline Groves RN  Outcome: Progressing  4/27/2022 0949 by Pauline Groves RN  Outcome: Progressing     Problem: Nutrition Deficit:  Goal: Optimize nutritional status  4/27/2022 0949 by Pauline Groves RN  Outcome: Progressing  4/27/2022 0949 by Pauline Groves RN  Outcome: Progressing

## 2022-04-27 NOTE — PROGRESS NOTES
Pulmonary and Critical Care  Progress Note    Subjective: The patient is sitting in the chair. Having breakfast.  Shortness of breath none. Chest pain none. Addressing respiratory complaints Patient is negative for  hemoptysis and cyanosis. CONSTITUTIONAL:  negative for fevers and chills. Past Medical History:     has a past medical history of Cerebral palsy ([facility] [address] Depression, Hypertension, and Seizures ([facility] [address] has a past surgical history that includes Eye surgery; Abdomen surgery; and Upper gastrointestinal endoscopy (N/A, 12/3/2019). reports that he has never smoked. He has never used smokeless tobacco. He reports that he does not drink alcohol and does not use drugs. Family history:  family history is not on file. Allergies   Allergen Reactions    Pcn [Penicillins]      Social History:    Reviewed; no changes    Objective:   PHYSICAL EXAM:        VITALS:  BP (!) 140/82   Pulse 93   Temp 98.1 °F (36.7 °C) (Oral)   Resp 17   Ht 5' 7\" (1.702 m)   Wt 167 lb 12.3 oz (76.1 kg)   SpO2 93%   BMI 26.28 kg/m²     24HR INTAKE/OUTPUT:      Intake/Output Summary (Last 24 hours) at 4/27/2022 0950  Last data filed at 4/26/2022 1238  Gross per 24 hour   Intake 350 ml   Output --   Net 350 ml       CONSTITUTIONAL:  Awake. LUNGS:  decreased breath sounds. CARDIOVASCULAR:  normal S1 and S2 and negative JVD  ABD:Abdomen soft, non-tender. BS normal. No masses,  No organomegaly. Irl Lark. DATA:    CBC:  No results for input(s): WBC, RBC, HGB, HCT, PLT, MCV, MCH, MCHC, RDW, NRBC, SEGSPCT, BANDSPCT in the last 72 hours. BMP:  No results for input(s): NA, K, CL, CO2, BUN, CREATININE, CALCIUM, GLUCOSE in the last 72 hours. ABG:  No results for input(s): PH, PO2ART, SHB8XPV, HCO3, BEART, O2SAT in the last 72 hours.   Lab Results   Component Value Date    PROBNP 25.54 04/19/2022    PROBNP 26.35 04/17/2022    PROBNP 35.21 06/08/2021     No results found for: [facility]    Radiology Review:  Pertinent images / reports were reviewed as a part of this visit. Assessment:     Patient Active Problem List   Diagnosis    Pneumonia    Cerebral palsy, hemiplegic (HCC)    Hypoxia    Seizure disorder ([address]    Gait disturbance    Pneumonia of both lungs due to infectious organism    Acute upper GI bleed    Cecal polyp    Sebaceous cyst    Acute respiratory failure with hypoxemia (HCC)    Influenza A    Generalized weakness    Essential hypertension    Spastic quadriplegic cerebral palsy ([address]       Plan:   1. Overall the patient has improved. 2. Inc. Activity.   Treasure Beasley MD   4/27/2022  9:50 AM

## 2022-04-27 NOTE — PROGRESS NOTES
Gordo Gordon    : 1965  Acct #: [de-identified]  MRN: [id number]              PM&R Progress Note      Admitting diagnosis: Acute respiratory failure ([address])     Comorbid diagnoses impacting rehabilitation: Influenza A pneumonia, hypoxemia, generalized weakness, gait disturbance, seizure disorder, essential hypertension, cerebral palsy     Chief complaint: He denies any specific complaints. Prior (baseline) level of function: Independent. Current level of function:         Current  IRF-BENNETT and Goals:   Occupational Therapy:    Short Term Goals  Time Frame for Short term goals: STGs=LTGs :   Long Term Goals  Time Frame for Long term goals : 7-10 days or until d/c. Long Term Goal 1: Pt will complete self-feeding c Ind. Long Term Goal 2: Pt will complete grooming tasks c Mod I.  Long Term Goal 3: Pt will complete total body bathing c Mod I.  Long Term Goal 4: Pt will complete UB dressing c Ind. Long Term Goal 5: Pt will complete LB dressing c Mod I. Additional Goals?: Yes  Long Term Goal 6: Pt will doff/don footwear c Mod I.  Long Term Goal 7: Pt will complete toileting c Mod I.  Long Term Goal 8: Pt will perform functional transfers (bed, chair, toilet, shower) c DME PRN and Mod I.  Long Term Goal 9: Pt will perform therex/therax to facilitate increased strength/endurance/ax tolerance (c emphasis on dynamic standing balance/tolerance > 8 mins) c Mod I.  Long Term Goal 10:  Pt will complete home management tasks c Mod I. :                                       Eating: Eating  Assistance Needed: Independent  Comment: per patient able to complete independently  CARE Score: 6  Discharge Goal: Independent       Oral Hygiene: Oral Hygiene  Assistance Needed: Independent  Comment: X  CARE Score: 6  Discharge Goal: Independent    UB/LB Bathing: Shower/Bathe Self  Assistance Needed: Supervision or touching assistance  Comment: SBA in stance, seated majority of shower  CARE Score: 4  Discharge Goal: Independent    UB Dressing: Upper Body Dressing  Assistance Needed: Setup or clean-up assistance  Comment: Set up  CARE Score: 5  Discharge Goal: Independent         LB Dressing: Lower Body Dressing  Assistance Needed: Supervision or touching assistance  Comment: SBA in stance managing over hips, threads independently using figure four method  CARE Score: 4  Discharge Goal: Independent    Donning and [place] Footwear: Putting On/Taking Off Footwear  Assistance Needed: Setup or clean-up assistance  Comment: Set up uses figure four manages both socks and shoes  CARE Score: 5  Discharge Goal: Independent      Toile[address] needed: Supervision or touching assistance  Comment: Sup for safety in stance, comepletes hygiene seated following BM  CARE Score: 4  Discharge Goal: Independent      Toilet Transfers: Toilet Transfer  Assistance needed: Supervision or touching assistance  Comment: Sup/SBA  CARE Score: 4  Discharge Goal: Independent    Physical Therapy:         Long Term Goals  Time Frame for Long term goals : 10 tx days:  Long term goal 1: Pt will complete bed mobility (scooting, rolling R/L, and sup<->sit) Ind.  Long term goal 2: Pt will complete OOB transfers Mod Ind-Ind. Long term goal 3: Pt will ambulate 150 ft over level surface using LRAD prn Mod Ind-Ind. Long term goal 4: Pt will ambulate at least 10 ft of uneven surface and ascend/descend curb step using LRAD with SBA-Sup. Long term goal 5: Pt will ascend/descend 1 flight of stairs using railings with SBA. Long term goal 6: Pt will complete object retrieval from the floor using reacher Mod Ind.       Bed Mobility:   Sit to Lying  Assistance Needed: Independent  Comment: Practiced in regular flat bed without rails  CARE Score: 6  Discharge Goal: Independent  Roll Left and Right  Assistance Needed: Independent  Comment: Practiced in regular flat bed without rails  CARE Score: 6  Discharge Goal: Independent  Lying to Sitting on Side of Bed  Assistance Needed: Independent  Comment: Practiced in regular flat bed without rails  CARE Score: 6  Discharge Goal: Independent    Transfers:    Sit to Stand  Assistance Needed: Supervision or touching assistance  Comment: SBA using 2WW  CARE Score: 4  Discharge Goal: Independent  Chair/Bed-to-Chair Transfer  Assistance Needed: Supervision or touching assistance  Comment: SBA using 2WW  CARE Score: 4  Discharge Goal: Independent     Car Transfer  Assistance Needed: Supervision or touching assistance  Comment: SBA with RW. Pt requires assist for O2 line management. CARE Score: 4  Discharge Goal: Independent    Ambulation:    Walking Ability  Does the Patient Walk?: Yes     Walk 10 Feet  Assistance Needed: Supervision or touching assistance  Comment: SBA using 2WW  CARE Score: 4  Discharge Goal: Independent     Walk 50 Feet with Two Turns  Assistance Needed: Supervision or touching assistance  Comment: SBA using 2WW  Reason if not Attempted: Not attempted due to medical condition or safety concerns  CARE Score: 4  Discharge Goal: Independent     Walk 150 Feet  Assistance Needed: Supervision or touching assistance  Comment: SBA using 2WW  Reason if not Attempted: Not attempted due to medical condition or safety concerns  CARE Score: 4  Discharge Goal: Independent     Walking 10 Feet on Uneven Surfaces  Assistance Needed: Supervision or touching assistance  Comment: CGA with RW. Pt requires assist for O2 line management. CARE Score: 4  Discharge Goal: Supervision or touching assistance     1 Step (Curb)  Assistance Needed: Supervision or touching assistance  Comment: CGA with RW. Pt requires assist for O2 line management. (4\" step)  CARE Score: 4  Discharge Goal: Supervision or touching assistance     4 Steps  Assistance Needed: Supervision or touching assistance  Comment: SBA with 2 rails. Pt required assist for O2 line management.   CARE Score: 4  Discharge Goal: Supervision or touching assistance 12 Steps  Assistance Needed: Supervision or touching assistance  Comment: SBA with 2 rails. Pt required assist for O2 line management. Reason if not Attempted: Not attempted due to medical condition or safety concerns  CARE Score: 4  Discharge Goal: Supervision or touching assistance       Wheelchair:  w/c Ability: Wheelchair Ability  Uses a Wheelchair and/or Scooter?: No                Balance:        Object: Picking Up Object  Assistance Needed: Supervision or touching assistance  Comment: SBA with RW. Pt used reacher. CARE Score: 4  Discharge Goal: Independent    I      Exam:    Blood pressure (!) 140/95, pulse 86, temperature 97.9 °F (36.6 °C), temperature source Oral, resp. rate 14, height 5' 7\" (1.702 m), weight 167 lb 12.3 oz (76.1 kg), SpO2 92 %. General: Observed sitting up in a bedside chair. Gazing right and left. Smiling. HEENT: Alert. Pleasant. Passive in conversation. Pulmonary: Unlabored breathing with symmetric air exchange. Occasional cough. Cardiac: Regular rate and rhythm. Abdomen: Patient's abdomen is soft and nondistended. Bowel sounds were present throughout. There was no rebound, guarding or masses noted. Upper extremities: Dysmetric movements of both upper limbs. No new bruising or swelling. Lower extremities: Spastic movements of both lower limbs with no significant swelling. Heels clear. Sitting balance was fair. Standing balance was poor.     Lab Results   Component Value Date    WBC 8.6 04/22/2022    HGB 14.7 04/22/2022    HCT 44.5 04/22/2022    MCV 91.9 04/22/2022     04/22/2022     Lab Results   Component Value Date    INR 1.13 12/02/2019    INR 1.08 11/30/2019    PROTIME 12.9 12/02/2019    PROTIME 12.3 11/30/2019     Lab Results   Component Value Date    CREATININE 0.8 (L) 04/22/2022    BUN 12 04/22/2022     04/22/2022    K 3.7 04/22/2022     04/22/2022    CO2 25 04/22/2022     Lab Results   Component Value Date    ALT 33 04/20/2022    AST 21 04/20/2022    ALKPHOS 94 04/20/2022    BILITOT 0.7 04/20/2022       Expected length of stay  prior to a supervised level of function for discharge home with a walker and [facility] OT/PT is 5/3/2022. Recommendations:    1. Acute respiratory failure with gait dysfunction:  Requires cueing to execute all parts of his daily occupational and physical therapy. He is benefiting from aggressive pulmonary hygiene measures, supplemental oxygen to keep saturations greater than 90%. He has completed a full course of the Tamiflu. Working on weaning his oxygen supplementation. Some urinary urgency. No bowel incontinence. Ongoing duo nebs, cough medicine and adaptive equipment training.  Verbal cues and minimum physical assistance for transfers today. 2. DVT prophylaxis: Lovenox 40 mg subcu daily.  I must monitor his hemoglobin and platelet count periodically while on this medication.  Weightbearing activities are slowly improving daily.  GI prophylaxis provided.  No clinical signs of blood loss. 3. Seizure disorder: Tolerating his home AED med of Keppra 750 mg twice daily.  Avoiding exhaustion.  Regulating sleep-wake schedule.  No clinical evidence of active seizures. 4. Hypertension: Norvasc to manage his blood pressure.  Target systolic blood pressure is 120-140.  Vital signs are checked at rest and with activity.  Blood pressure is essentially within target range again today. 5. Depression with anxiety: 40 mg daily.  Sleeping a little better.  Involvement of his group home staff when possible.  Fair sleep reported. Saba Brito in treatment. Counseling and Coordination of Care: In care conference today I met with the patient's OT, PT, RN and . We discussed the patient's problems, progress and prognosis. Disposition issues were clarified and plans were established for ongoing rehabilitation efforts beyond the ARU stay.  I reviewed this information with the patient during a second distinct visit with the patient. More than half of the total time of 32 minutes spent with the patient involved counseling and coordination of care.

## 2022-04-27 NOTE — FLOWSHEET NOTE
[x] daily progress note       [] discharge       Patient Name:  Bradly Velasquez   :  1965 MRN: [id number]  Room:  [address] [place], [place] [address] Date of Admission: 2022  Rehabilitation Diagnosis:   Acute respiratory failure with hypoxia [J96.01]  Acute respiratory failure with hypoxemia (HCC) [J96.01]       Date 2022       Day of ARU Week:  7   Time IN/OUT 9168-7299  [id number]   Individual Tx Minutes 60   Group Tx Minutes 60   TOTAL Tx Time Mins 120   Restrictions Restrictions/Precautions  Restrictions/Precautions: Fall Risk,Seizure,General Precautions (Droplet precautions; R hand contracture, CP)  Position Activity Restriction  Other position/activity restrictions: monitor O2   Communication with other providers: [x]   OK to see per nursing:     []   Spoke with team member regarding:      Subjective observations and cognitive status: AM session: pt seen sitting up in recliner at beginning of treatment. Agreeable to therapy. Group session: pt seen sitting up in w/c at beginning of treatment. Agreeable to therapy. Pain level/location: 0/10          Discharge recommendations  Anticipated discharge date: Expected Discharge Date: 22    Destination: []???home alone   []? ??home alone with assist PRN     []? ?? home w/ family      []? ?? Continuous supervision  []? ??SNF    [x]? ?? Assisted living     []? ?? Other:   Continued therapy: [x]? ? ? [facility] PT  []???OUTPATIENT  PT   []??? No Further PT  Equipment needs:Daniel Simmons Shruthi requires the assistance of a wheeled walker to successfully ambulate from room to room at home to allow completion of daily living tasks such as: bathing, toileting, dressing and grooming. A wheeled walker is necessary due to the patient's unsteady gait, upper body weakness, inability to  a standard walker. This patient can ambulate only by pushing a walker instead of using a lesser assistive device such as a cane or crutch.      [x]   Pt received out of bed     Transfers:    Sit--> Stand:  Supervision   Stand --> Sit:   Supervision   Stand pivot transfers:  Supervision   Assistive device required for transfer:   RW    Gait:    Distance:  AM session: 429'+254'   Assistance:  CGA-SBA (usual was SBA) (pt had some impulsiveness at times and increased weight through left UE causing walker to tip towards the left. This happened when patient was distracted. Increased vcs required to place equal weight through BUEs. Pt then maintained even weight bearing through [address]. Vcs to stay closer to walker and to decrease guera. Device: RW  Gait Quality:  Step-to pattern     Stairs (AM session)   # Completed:  15  Assistance:  SBA  Supportive Device: 2 rails    Uneven Surfaces: (AM session)       Assistance:  CGA  Device: RW  Surfaces Completed:   [x] Carpeted Surface with bean bags beneath      []  Throw rugs       []  Ramp       []  Outdoor pavements        []  Grass             []  Loose gravel        []  Other:       AM session: Pt amb up/down 4\" curb step CGA with RW. Vcs for walker safety. Additional Therapeutic activities/exercises completed this date:     [x]   Nu-step: AM session: Time: 8 minutes     Level:  1     (for strengthening and endurance training)    []   Rebounder:    []  Seated     []  Standing        []   Balance training         []   Postural training    []   Supine ther ex (reps/sets):     []   Seated ther ex (reps/sets):     []   Standing ther ex (reps/sets):     []   Picking up object from floor (standing):                   []   Reacher used   []   Other:   []   Other:    Group:   Total time in group = 60 min    Exercise / Josemanuel Rainey Group:Daniel Hein participated in exercise and discussion on benefits and precautions during exercise. This provided the opportunity to have fun, build camaraderie, increase endurance, improve breathing techniques, balance, and strength.  Remediostae Fisher performed a variety of seated activities as able, with focus on technique and safety during exercise. Also focused on warm-up, warm-down, endurance monitoring, strengthening & strategies, function, safety, and general social & communication opportunities with other group members. Functional areas targeted:   [] Communication [x] Memory  [x] Coordination    [] Kitchen Safety [x] Problem Solving  [x] Strengthening     [x] Attention  [x] Endurance   [] Mobility    [x] Awareness/Insight [] Balance  [] Transfers  [x] Safety   [] Other (specify)  Participation:  [x] Actively participated        Education completed: benefits of exercise; signs and symptoms of exercise intolerance; importance of continuing exercise after discharge from ARU. Cognitive fx during this group: pt did well with following instructions and paying attention during group. Family present: no         Patient/Caregiver Education and Training:   [x]   Transfer technique/safety  [x]   Gait technique/sequencing  [x]   Proper use of assistive device  [x]   Advanced mobility safety and technique  []   Reinforced patient's precautions/mobility while maintaining precautions  []   Postural awareness  []   Family training    Treatment Plan for Next Session: gait; transfers; balance training; exercises; advanced gait; stair training      Assessment: This pt demonstrated a positive response to today's treatment as evidenced by improved mobility. The patient is making progress toward established goals as evidenced by QI scores. Ongoing deficits are observed in the areas of strength, balance, endurance, and safety and continued focus on this is recommended.       Treatment/Activity Tolerance:   [x] Tolerated treatment with no adverse effects    [] Patient limited by fatigue  [] Patient limited by pain   [] Patient limited by medical complications:    [] Adverse reaction to Tx:   [] Significant change in status    Safety:       []  bed alarm set    [x]  chair alarm set    []  Pt refused alarms                []  Telesitter activated [x]  Gait belt used during tx session      [x]other: After AM session: pt left sitting up in recliner with call light at end of treatment. After group session: pt left with rehab aide Feli Topete to return to room at end of group session. Number of Minutes/Billable Intervention  Gait Training 52   Therapeutic Exercise 8   Neuro Re-Ed    Therapeutic Activity    Wheelchair Propulsion    Group 60   Other:    TOTAL 120         Social History  Social/Functional History  Lives With: Other (comment) (Pt lives in a group home.)  Type of Home: Assisted living  Home Layout: One level  Home Access: Level entry  Bathroom Shower/Tub: Walk-in shower,Shower chair with back  Bathroom Toilet: Standard  Bathroom Equipment: Shower chair,Grab bars in shower,Grab bars around [facility]  Bathroom Accessibility: [facility]:  (No AD)  Has the patient had two or more falls in the past year or any fall with injury in the past year?: Yes (Pt reports 4-5 falls in the last year c no significant injuries.)  Receives Help From: Personal care attendant (Group home staff cook meals and completes majority of the housework.)  ADL Assistance: [address] [place] [address] Avenue: Independent  Homemaking Responsibilities: Yes  Laundry Responsibility: Primary  Cleaning Responsibility: Secondary (Pt reports he cleans his own dishes.)  Ambulation Assistance: Independent  Transfer Assistance: Independent  Active : No  Patient's  Info: DD services Luisa Most of Transportation: Macarena Sepulveda  Occupation: Part time employment  Type of Occupation: [employer], 4 days a week [address]: Pt enjoys watching TV/sports and playing cards. Additional Comments: Pt reports he has a flat bed at home. Objective                                                                                    Goals:  (Update in navigator)   :   Long Term Goals  Time Frame for Long term goals : 10 tx days:  Long term goal 1: Pt will complete bed mobility (scooting, rolling R/L, and sup<->sit) Ind.  Long term goal 2: Pt will complete OOB transfers Mod Ind-Ind. Long term goal 3: Pt will ambulate 150 ft over level surface using LRAD prn Mod Ind-Ind. Long term goal 4: Pt will ambulate at least 10 ft of uneven surface and ascend/descend curb step using LRAD with SBA-Sup. Long term goal 5: Pt will ascend/descend 1 flight of stairs using railings with SBA. Long term goal 6: Pt will complete object retrieval from the floor using reacher Mod Ind.:        Plan of Care                                                                              Times per week: 5 days per week for a minimum of 60 minutes/day plus group as appropriate for 60 minutes.   Treatment to include Current Treatment Recommendations: Strengthening,Endurance training,Safety education & training,Balance training,Functional mobility training,Transfer training,Gait training,Stair training,Return to work related activity,Home exercise program,Patient/Caregiver education & training,Equipment evaluation, education, & procurement,Therapeutic activities    Electronically signed by   Araceli Wallace, [id number]  4/27/2022, 10:05 AM

## 2022-04-27 NOTE — PLAN OF CARE
Problem: Discharge Planning  Goal: Discharge to home or other facility with appropriate resources  4/27/2022 1128 by Javad Quintanilla RN  Outcome: Progressing  4/27/2022 0949 by Javad Quintanilla RN  Outcome: Progressing  4/27/2022 0949 by Javad Quintanilla RN  Outcome: Progressing     Problem: Safety - Adult  Goal: Free from fall injury  4/27/2022 1128 by Javad Quintanilla RN  Outcome: Progressing  4/27/2022 0949 by Javad Quintanilla RN  Outcome: Progressing  4/27/2022 0949 by Javad Quintanilla RN  Outcome: Progressing     Problem: ABCDS Injury Assessment  Goal: Absence of physical injury  4/27/2022 1128 by Javad Quintanilla RN  Outcome: Progressing  4/27/2022 0949 by Javad Quintanilla RN  Outcome: Progressing  4/27/2022 0949 by Javad Quintanilla RN  Outcome: Progressing     Problem: Skin/Tissue Integrity  Goal: Absence of new skin breakdown  Description: 1. Monitor for areas of redness and/or skin breakdown  2. Assess vascular access sites hourly  3. Every 4-6 hours minimum:  Change oxygen saturation probe site  4. Every 4-6 hours:  If on nasal continuous positive airway pressure, respiratory therapy assess nares and determine need for appliance change or resting period.   4/27/2022 1128 by Javad Quintanilla RN  Outcome: Progressing  4/27/2022 0949 by Javad Quintanilla RN  Outcome: Progressing  4/27/2022 0949 by Javad Quintanilla RN  Outcome: Progressing     Problem: Nutrition Deficit:  Goal: Optimize nutritional status  4/27/2022 1128 by Javad Quintanilla RN  Outcome: Progressing  4/27/2022 0949 by Javad Quintanilla RN  Outcome: Progressing  4/27/2022 0949 by Javad Quintanilla RN  Outcome: Progressing

## 2022-04-28 LAB
ALBUMIN SERPL-MCNC: 3.8 GM/DL (ref 3.4–5)
ALP BLD-CCNC: 90 IU/L (ref 40–128)
ALT SERPL-CCNC: 36 U/L (ref 10–40)
ANION GAP SERPL CALCULATED.3IONS-SCNC: 12 MMOL/L (ref 4–16)
AST SERPL-CCNC: 20 IU/L (ref 15–37)
BASOPHILS ABSOLUTE: 0.1 K/CU MM
BASOPHILS RELATIVE PERCENT: 1.2 % (ref 0–1)
BILIRUB SERPL-MCNC: 0.6 MG/DL (ref 0–1)
BUN BLDV-MCNC: 9 MG/DL (ref 6–23)
CALCIUM SERPL-MCNC: 8.6 MG/DL (ref 8.3–10.6)
CHLORIDE BLD-SCNC: 104 MMOL/L (ref 99–110)
CO2: 26 MMOL/L (ref 21–32)
CREAT SERPL-MCNC: 0.8 MG/DL (ref 0.9–1.3)
DIFFERENTIAL TYPE: ABNORMAL
EOSINOPHILS ABSOLUTE: 0.9 K/CU MM
EOSINOPHILS RELATIVE PERCENT: 11.5 % (ref 0–3)
GFR AFRICAN AMERICAN: >60 ML/MIN/1.73M2
GFR NON-AFRICAN AMERICAN: >60 ML/MIN/1.73M2
GLUCOSE BLD-MCNC: 86 MG/DL (ref 70–99)
HCT VFR BLD CALC: 44.3 % (ref 42–52)
HEMOGLOBIN: 15.1 GM/DL (ref 13.5–18)
IMMATURE NEUTROPHIL %: 0.4 % (ref 0–0.43)
LYMPHOCYTES ABSOLUTE: 2.3 K/CU MM
LYMPHOCYTES RELATIVE PERCENT: 30.3 % (ref 24–44)
MCH RBC QN AUTO: 31.3 PG (ref 27–31)
MCHC RBC AUTO-ENTMCNC: 34.1 % (ref 32–36)
MCV RBC AUTO: 91.9 FL (ref 78–100)
MONOCYTES ABSOLUTE: 1 K/CU MM
MONOCYTES RELATIVE PERCENT: 12.3 % (ref 0–4)
NUCLEATED RBC %: 0 %
PDW BLD-RTO: 11.7 % (ref 11.7–14.9)
PLATELET # BLD: 392 K/CU MM (ref 140–440)
PMV BLD AUTO: 9.2 FL (ref 7.5–11.1)
POTASSIUM SERPL-SCNC: 3.6 MMOL/L (ref 3.5–5.1)
RBC # BLD: 4.82 M/CU MM (ref 4.6–6.2)
SEGMENTED NEUTROPHILS ABSOLUTE COUNT: 3.4 K/CU MM
SEGMENTED NEUTROPHILS RELATIVE PERCENT: 44.3 % (ref 36–66)
SODIUM BLD-SCNC: 142 MMOL/L (ref 135–145)
TOTAL IMMATURE NEUTOROPHIL: 0.03 K/CU MM
TOTAL NUCLEATED RBC: 0 K/CU MM
TOTAL PROTEIN: 6.4 GM/DL (ref 6.4–8.2)
WBC # BLD: 7.7 K/CU MM (ref 4–10.5)

## 2022-04-28 PROCEDURE — 6360000002 HC RX W HCPCS: Performed by: INTERNAL MEDICINE

## 2022-04-28 PROCEDURE — 6370000000 HC RX 637 (ALT 250 FOR IP): Performed by: INTERNAL MEDICINE

## 2022-04-28 PROCEDURE — 94761 N-INVAS EAR/PLS OXIMETRY MLT: CPT

## 2022-04-28 PROCEDURE — 97535 SELF CARE MNGMENT TRAINING: CPT

## 2022-04-28 PROCEDURE — 97530 THERAPEUTIC ACTIVITIES: CPT

## 2022-04-28 PROCEDURE — 97116 GAIT TRAINING THERAPY: CPT

## 2022-04-28 PROCEDURE — 97110 THERAPEUTIC EXERCISES: CPT

## 2022-04-28 PROCEDURE — 36415 COLL VENOUS BLD VENIPUNCTURE: CPT

## 2022-04-28 PROCEDURE — 85025 COMPLETE CBC W/AUTO DIFF WBC: CPT

## 2022-04-28 PROCEDURE — 80053 COMPREHEN METABOLIC PANEL: CPT

## 2022-04-28 PROCEDURE — 94640 AIRWAY INHALATION TREATMENT: CPT

## 2022-04-28 PROCEDURE — 6370000000 HC RX 637 (ALT 250 FOR IP): Performed by: PHYSICAL MEDICINE & REHABILITATION

## 2022-04-28 PROCEDURE — 94150 VITAL CAPACITY TEST: CPT

## 2022-04-28 PROCEDURE — 1280000000 HC REHAB R&B

## 2022-04-28 PROCEDURE — 99232 SBSQ HOSP IP/OBS MODERATE 35: CPT | Performed by: PHYSICAL MEDICINE & REHABILITATION

## 2022-04-28 RX ORDER — IPRATROPIUM BROMIDE AND ALBUTEROL SULFATE 2.5; .5 MG/3ML; MG/3ML
1 SOLUTION RESPIRATORY (INHALATION) EVERY 4 HOURS PRN
Status: DISCONTINUED | OUTPATIENT
Start: 2022-04-28 | End: 2022-05-03 | Stop reason: HOSPADM

## 2022-04-28 RX ADMIN — BENZONATATE 100 MG: 100 CAPSULE ORAL at 09:41

## 2022-04-28 RX ADMIN — BENZONATATE 100 MG: 100 CAPSULE ORAL at 14:50

## 2022-04-28 RX ADMIN — GUAIFENESIN 600 MG: 600 TABLET, EXTENDED RELEASE ORAL at 20:48

## 2022-04-28 RX ADMIN — LEVETIRACETAM 750 MG: 500 TABLET, FILM COATED ORAL at 09:41

## 2022-04-28 RX ADMIN — AMLODIPINE BESYLATE 10 MG: 10 TABLET ORAL at 09:41

## 2022-04-28 RX ADMIN — IPRATROPIUM BROMIDE AND ALBUTEROL SULFATE 1 AMPULE: .5; 2.5 SOLUTION RESPIRATORY (INHALATION) at 07:20

## 2022-04-28 RX ADMIN — BENZONATATE 100 MG: 100 CAPSULE ORAL at 20:48

## 2022-04-28 RX ADMIN — IPRATROPIUM BROMIDE AND ALBUTEROL SULFATE 1 AMPULE: .5; 2.5 SOLUTION RESPIRATORY (INHALATION) at 11:27

## 2022-04-28 RX ADMIN — PAROXETINE HYDROCHLORIDE 40 MG: 20 TABLET, FILM COATED ORAL at 09:41

## 2022-04-28 RX ADMIN — GUAIFENESIN 600 MG: 600 TABLET, EXTENDED RELEASE ORAL at 09:41

## 2022-04-28 RX ADMIN — LEVETIRACETAM 750 MG: 500 TABLET, FILM COATED ORAL at 20:48

## 2022-04-28 RX ADMIN — LEVOTHYROXINE SODIUM 25 MCG: 0.05 TABLET ORAL at 06:12

## 2022-04-28 RX ADMIN — IPRATROPIUM BROMIDE AND ALBUTEROL SULFATE 1 AMPULE: .5; 2.5 SOLUTION RESPIRATORY (INHALATION) at 16:00

## 2022-04-28 RX ADMIN — ENOXAPARIN SODIUM 40 MG: 100 INJECTION SUBCUTANEOUS at 09:41

## 2022-04-28 ASSESSMENT — PAIN SCALES - GENERAL
PAINLEVEL_OUTOF10: 0
PAINLEVEL_OUTOF10: 0

## 2022-04-28 NOTE — PLAN OF CARE
Problem: Discharge Planning  Goal: Discharge to home or other facility with appropriate resources  4/28/2022 0131 by Harrison Shin RN  Outcome: Progressing     Problem: Safety - Adult  Goal: Free from fall injury  4/28/2022 0131 by Harrison Shin RN  Outcome: Progressing

## 2022-04-28 NOTE — PROGRESS NOTES
Pulmonary and Critical Care  Progress Note    Subjective: The patient is sitting in the chair. Shortness of breath none. Chest pain none. Addressing respiratory complaints Patient is negative for  hemoptysis and cyanosis. CONSTITUTIONAL:  negative for fevers and chills. Past Medical History:     has a past medical history of Cerebral palsy ([address] Depression, Hypertension, and Seizures ([address] has a past surgical history that includes Eye surgery; Abdomen surgery; and Upper gastrointestinal endoscopy (N/A, 12/3/2019). reports that he has never smoked. He has never used smokeless tobacco. He reports that he does not drink alcohol and does not use drugs. Family history:  family history is not on file. Allergies   Allergen Reactions    Pcn [Penicillins]      Social History:    Reviewed; no changes    Objective:   PHYSICAL EXAM:        VITALS:  /80   Pulse 89   Temp 98 °F (36.7 °C) (Oral)   Resp 16   Ht 5' 7\" (1.702 m)   Wt 163 lb 9.3 oz (74.2 kg)   SpO2 95%   BMI 25.62 kg/m²     24HR INTAKE/OUTPUT:      Intake/Output Summary (Last 24 hours) at 4/28/2022 1158  Last data filed at 4/28/2022 0915  Gross per 24 hour   Intake 760 ml   Output 275 ml   Net 485 ml       CONSTITUTIONAL:  Awake. LUNGS:  decreased breath sounds, occ basilar crackles. CARDIOVASCULAR:  normal S1 and S2 and negative JVD  ABD:Abdomen soft, non-tender. BS normal. No masses,  No organomegaly. Catalina Jewel. DATA:    CBC:  Recent Labs     04/28/22  0559   WBC 7.7   RBC 4.82   HGB 15.1   HCT 44.3      MCV 91.9   MCH 31.3*   MCHC 34.1   RDW 11.7   SEGSPCT 44.3      BMP:  Recent Labs     04/28/22  0559      K 3.6      CO2 26   BUN 9   CREATININE 0.8*   CALCIUM 8.6   GLUCOSE 86      ABG:  No results for input(s): PH, PO2ART, VCF2QGA, HCO3, BEART, O2SAT in the last 72 hours.   Lab Results   Component Value Date    PROBNP 25.54 04/19/2022    PROBNP 26.35 04/17/2022    PROBNP 35.21 06/08/2021     No results found for: [facility]    Radiology Review:  Pertinent images / reports were reviewed as a part of this visit. Assessment:     Patient Active Problem List   Diagnosis    Pneumonia    Cerebral palsy, hemiplegic (HCC)    Hypoxia    Seizure disorder ([address]    Gait disturbance    Pneumonia of both lungs due to infectious organism    Acute upper GI bleed    Cecal polyp    Sebaceous cyst    Acute respiratory failure with hypoxemia (HCC)    Influenza A    Generalized weakness    Essential hypertension    Spastic quadriplegic cerebral palsy ([address]       Plan:   1. Overall the patient has improved. 2. Inc. Activity.   Luh Deal MD   4/28/2022  11:58 AM

## 2022-04-28 NOTE — PROGRESS NOTES
Physical Rehabilitation: OCCUPATIONAL THERAPY     [x] daily progress note       [] discharge       Patient Name:  Ronal Davenport   :  1965 MRN: [id number]  Room:  [address] [place] [address] Date of Admission: 2022  Rehabilitation Diagnosis:   Acute respiratory failure with hypoxia [J96.01]  Acute respiratory failure with hypoxemia (HCC) [J96.01]       Date 2022       Day of ARU Week:  1   Time IN/OUT 4636-8058   Individual Tx Minutes 50   Group Tx Minutes    Co-Treat Minutes    Concurrent Tx Minutes    TOTAL Tx Time Mins 50   Variance Time    Variance Time []   Refusal due to:     []   Medical hold/reason:    []   Illness   []   Off Unit for test/procedure  []   Extra time needed to complete task  []   Therapeutic need  []   Other (specify):   Restrictions Restrictions/Precautions: Fall Risk,Seizure,General Precautions (Droplet precautions; R hand contracture, CP)         Communication with other providers: [x]   OK to see per nursing:     []   Spoke with team member regarding:      Subjective observations and cognitive status: Pt sitting in recliner upon arrival. Pt pleasant and agreeable to OT tx. Pain level/location:   0 /10       Location: denies pain   Discharge recommendations  Anticipated discharge date:    Destination: []?home alone   []?home alone w assist prn   []? home w/ family    []? Continuous supervision       []? SNF    []? Assisted living     [x]? Other: Group home   Continued therapy: [x]? Kalani Kruger OT  []? OUTPATIENT  OT   []?  No Further OT  Equipment needs: None     Toileting:   Denies need      Toilet Transfers:     Device Used:    []   Standard Toilet         []   Grab Bars           []  Bedside Commode       []   Elevated Toilet          []   Other:        Bed Mobility:           [x]   Pt received out of bed       Transfers:    Sit--> Stand:  CGA  Stand --> Sit:   Sup   Stand-Pivot:   CGA  Other:    Assistive device required for transfer:   FWW      Functional Mobility: Assistance:  Pt amb room<>therapy gym c FWW @ CGA and VCs to keep all 4 points of the walker on the ground. Device:   []   Rolling Walker     []   Standard Izell Sarks []   Wheelchair        []   [employer]       []   4-Wheeled Izell Sarks         []   Cardiac Izell Sarks       []   Other:        Homemaking Tasks:   Pt preformed laundry task c FWW @ SBA-CGA c VCs for safety c FWW pt demoed fair+ safety awareness throughout ax. Additional Therapeutic activities/exercises completed this date:     []   ADL Training   []   Balance/Postural training     []   Bed/Transfer Training   [x]   Endurance Training   []   Neuromuscular Re-ed   []   Nu-step:  Time:        Level:         #Steps:       []   Rebounder:    []  Seated     []  Standing        []   Supine Ther Ex (reps/sets):     [x]   Seated Ther Ex (reps/sets):  Rickshaw: 4 sets x 8 reps tolerated 25# c RBs in between sets     Pt engaged in seated  BUE ex c 3# weighted ball. Pt completed 1 set x 8 reps in all planes  and all joints c RB between each exercise     []   Standing Ther Ex (reps/sets):     []   Other:      Comments: All interventions completed to increase strength and endurance to further IND c (I)ADLs and functional transfers/mobility. Patient/Caregiver Education and Training:   [x]   Adaptive Equipment Use  [x]   Bed Mobility/Transfer Technique/Safety  [x]   Energy Conservation Tips  []   Family training  [x]   Postural Awareness  [x]   Safety During Functional Activities  []   Reinforced Patient's Precautions   []   Progress was updated and reviewed in Rehabtracker with patient and/or family this         date. Treatment Plan for Next Session: Cont OT POC      Assessment: This pt demonstrated a positive response to today's treatment as evidenced by good participation. The patient is making  progress toward established goals as evidenced by QI scores.  Ongoing deficits are observed in the areas of dynamic balance, UE strength, and  FMC and continued focus on this is recommended. Treatment/Activity Tolerance:   [x] Tolerated treatment with no adverse effects    [] Patient limited by fatigue  [] Patient limited by pain   [] Patient limited by medical complications:    [] Adverse reaction to Tx:   [] Significant change in status    Safety:       []  bed alarm set    [x]  chair alarm set    []  Pt refused alarms                []  Telesitter activated      [x]  Gait belt used during tx session      []other:       Number of Minutes/Billable Intervention  Therapeutic Exercise 25   ADL Self-care    Neuro Re-Ed    Therapeutic Activity 25   Group    Other:    TOTAL 50       Social History  Social/Functional History  Lives With: Other (comment) (Pt lives in a group home.)  Type of Home: Assisted living  Home Layout: One level  Home Access: Level entry  Bathroom Shower/Tub: Walk-in shower,Shower chair with back  Bathroom Toilet: Standard  Bathroom Equipment: Shower chair,Grab bars in shower,Grab bars around [employer] [place] [employer] [facility]  Bathroom Accessibility: Accessible  Home Equipment:  (No AD)  Has the patient had two or more falls in the past year or any fall with injury in the past year?: Yes (Pt reports 4-5 falls in the last year c no significant injuries.)  Receives Help From: Personal care attendant (Group home staff cook meals and completes majority of the housework.)  ADL Assistance: [address] [place] [address] Avenue: Independent  Homemaking Responsibilities: Yes  Laundry Responsibility: Primary  Cleaning Responsibility: Secondary (Pt reports he cleans his own dishes.)  Ambulation Assistance: Independent  Transfer Assistance: Independent  Active : No  Patient's  Info: CLARICE Morris of Transportation: [employer]  Occupation: Part time employment  Type of Occupation: [employer], 4 days a week 3hrs  Leisure & Hobbies: Pt enjoys watching TV/sports and playing cards. Additional Comments: Pt reports he has a flat bed at home.     Objective Goals:  (Update in navigator)  Short Term Goals  Time Frame for Short term goals: STGs=LTGs:  Long Term Goals  Time Frame for Long term goals : 7-10 days or until d/c. Long Term Goal 1: Pt will complete self-feeding c Ind. Long Term Goal 2: Pt will complete grooming tasks c Mod I.  Long Term Goal 3: Pt will complete total body bathing c Mod I.  Long Term Goal 4: Pt will complete UB dressing c Ind. Long Term Goal 5: Pt will complete LB dressing c Mod I. Additional Goals?: Yes  Long Term Goal 6: Pt will doff/don footwear c Mod I.  Long Term Goal 7: Pt will complete toileting c Mod I.  Long Term Goal 8: Pt will perform functional transfers (bed, chair, toilet, shower) c DME PRN and Mod I.  Long Term Goal 9: Pt will perform therex/therax to facilitate increased strength/endurance/ax tolerance (c emphasis on dynamic standing balance/tolerance > 8 mins) c Mod I.  Long Term Goal 10: Pt will complete home management tasks c Mod I.:        Plan of Care                                                                              Times per week: 5 days per week for a minimum of 60 minutes/day plus group as appropriate for 60 minutes.   Treatment to include Plan  Times per Day: Daily  Current Treatment Recommendations: Strengthening,Balance training,Functional mobility training,Endurance training,Neuromuscular re-education,Safety education & training,Patient/Caregiver education & training,Equipment evaluation, education, & procurement,Self-Care / ADL,Home management training    Electronically signed by   ITALO Mistry,  4/28/2022, 1:01 PM

## 2022-04-28 NOTE — FLOWSHEET NOTE
[x] daily progress note       [] discharge       Patient Name:  Julio César Jameson   :  1965 MRN: [id number]  Room:  [address] [place], [place] [address] Date of Admission: 2022  Rehabilitation Diagnosis:   Acute respiratory failure with hypoxia [J96.01]  Acute respiratory failure with hypoxemia (HCC) [J96.01]       Date 2022       Day of ARU Week:  1   Time IN/OUT 5420-2962   Individual Tx Minutes 60   TOTAL Tx Time Mins 60   Restrictions Restrictions/Precautions  Restrictions/Precautions: Fall Risk,Seizure,General Precautions (Droplet precautions; R hand contracture, CP)  Position Activity Restriction  Other position/activity restrictions: monitor O2   Communication with other providers: [x]   OK to see per nursing:     []   Spoke with team member regarding:      Subjective observations and cognitive status: Pt seen sitting up in recliner at beginning of treatment. Agreeable to therapy. Pain level/location:    0/10          Discharge recommendations  Anticipated discharge date: Expected Discharge Date: 22    Destination: []? ???home alone   []? ???home alone with assist PRN     []???? home w/ family      []???? Continuous supervision  []????SNF    [x]? ??? Assisted living     []???? Other:   Continued therapy: [x]????[facility] PT  []????OUTPATIENT  PT   []???? No Further PT  Equipment needs:Daniel Henry requires the assistance of a wheeled walker to successfully ambulate from room to room at home to allow completion of daily living tasks such as: bathing, toileting, dressing and grooming. A wheeled walker is necessary due to the patient's unsteady gait, upper body weakness, inability to  a standard walker. This patient can ambulate only by pushing a walker instead of using a lesser assistive device such as a cane or crutch.        [x]   Pt received out of bed     Transfers:    Sit--> Stand: SBA  Stand --> Sit:   SBA  Chair-->Bed/Bed --> Chair:   SBA without AD   Assistive device required for transfer: RW  Max vcs for proper hand placement. Gait:    Distance:  490'+260'    Assistance:  SBA  Device:  RW  Gait Quality: step-to pattern; vcs to stay closer to walker and vcs to slow down guera at times. Additional Therapeutic activities/exercises completed this date:     []   Nu-step:  Time:        Level:         #Steps:       []   Rebounder:    []  Seated     []  Standing        [x]   Balance training: pt stood in // bars and performed static standing task in // bars x 2 minutes (standing on 2 blue stability trainers). In // bars, pt performed fwd/bwd walking x 2 trials with CGA for balance. Pt stood at toilet and performed urination with SBA for balance with grab bars. Pt stood at sink and performed washing and drying of hands SBA for balance with RW.          []   Postural training    []   Supine ther ex (reps/sets):     []   Seated ther ex (reps/sets):     [x]   Standing ther ex (reps/sets): in // bars: marching, knee flexion, hip abduction, heel raises, toe raises, mini-squats x 10 reps each for strengthening. []   Picking up object from floor (standing):                   []   Reacher used   []   Other:   []   Other:    Patient/Caregiver Education and Training:   [x]   Bed Mobility/Transfer technique/safety  [x]   Gait technique/sequencing  [x]   Proper use of assistive device  []   Advanced mobility safety and technique  []   Reinforced patient's precautions/mobility while maintaining precautions  []   Postural awareness  []   Family training    Treatment Plan for Next Session: gait; transfers; advanced gait; stair training; exercises; balance training      Assessment: This pt demonstrated a positive response to today's treatment as evidenced by improved mobility. The patient is making progress toward established goals as evidenced by QI scores. Ongoing deficits are observed in the areas of strength, balance, endurance, and safety and continued focus on this is recommended. Treatment/Activity Tolerance:   [x] Tolerated treatment with no adverse effects    [] Patient limited by fatigue  [] Patient limited by pain   [] Patient limited by medical complications:    [] Adverse reaction to Tx:   [] Significant change in status    Safety:       []  bed alarm set    [x]  chair alarm set    []  Pt refused alarms                []  Telesitter activated      [x]  Gait belt used during tx session      [x]other: pt left sitting up in recliner with call light at end of treatment.         Number of Minutes/Billable Intervention  Gait Training 15   Therapeutic Exercise 15   Neuro Re-Ed    Therapeutic Activity 30   Wheelchair Propulsion    Group    Other:    TOTAL 60         Social History  Social/Functional History  Lives With: Other (comment) (Pt lives in a group home.)  Type of Home: Assisted living  Home Layout: One level  Home Access: Level entry  Bathroom Shower/Tub: Walk-in shower,Shower chair with back  Bathroom Toilet: Standard  Bathroom Equipment: Shower chair,Grab bars in shower,Grab bars around [employer] [place] [employer]  Bathroom Accessibility: [facility]:  (No AD)  Has the patient had two or more falls in the past year or any fall with injury in the past year?: Yes (Pt reports 4-5 falls in the last year c no significant injuries.)  Receives Help From: Personal care attendant (Group home staff cook meals and completes majority of the housework.)  ADL Assistance: [address] [place] [address] Avenue: Independent  Homemaking Responsibilities: Yes  Laundry Responsibility: Primary  Cleaning Responsibility: Secondary (Pt reports he cleans his own dishes.)  Ambulation Assistance: Independent  Transfer Assistance: Independent  Active : No  Patient's  Info: CLARICE Berger of Transportation: Fransisco Serrano  Occupation: Part time employment  Type of Occupation: [employer] , 4 days a week [address]: Pt enjoys watching TV/sports and playing cards. Additional Comments: Pt reports he has a flat bed at home. Objective                                                                                    Goals:  (Update in navigator)   : Long Term Goals  Time Frame for Long term goals : 10 tx days:  Long term goal 1: Pt will complete bed mobility (scooting, rolling R/L, and sup<->sit) Ind.  Long term goal 2: Pt will complete OOB transfers Mod Ind-Ind. Long term goal 3: Pt will ambulate 150 ft over level surface using LRAD prn Mod Ind-Ind. Long term goal 4: Pt will ambulate at least 10 ft of uneven surface and ascend/descend curb step using LRAD with SBA-Sup. Long term goal 5: Pt will ascend/descend 1 flight of stairs using railings with SBA. Long term goal 6: Pt will complete object retrieval from the floor using reacher Mod Ind.:        Plan of Care                                                                              Times per week: 5 days per week for a minimum of 60 minutes/day plus group as appropriate for 60 minutes.   Treatment to include Current Treatment Recommendations: Strengthening,Endurance training,Safety education & training,Balance training,Functional mobility training,Transfer training,Gait training,Stair training,Return to work related activity,Home exercise program,Patient/Caregiver education & training,Equipment evaluation, education, & procurement,Therapeutic activities    Electronically signed by   Elena Keith, [id number]  4/28/2022, 3:04 PM

## 2022-04-28 NOTE — PROGRESS NOTES
Alexys Montez    : 1965  Acct #: [de-identified]  MRN: [id number]              PM&R Progress Note      Admitting diagnosis: Acute respiratory failure ([address])     Comorbid diagnoses impacting rehabilitation: Influenza A pneumonia, hypoxemia, generalized weakness, gait disturbance, seizure disorder, essential hypertension, cerebral palsy     Chief complaint: Feels okay during weaning trials of oxygen. Prior (baseline) level of function: Independent. Current level of function:         Current  IRF-[facility]T and Goals:   Occupational Therapy:    Short Term Goals  Time Frame for Short term goals: STGs=LTGs :   Long Term Goals  Time Frame for Long term goals : 7-10 days or until d/c. Long Term Goal 1: Pt will complete self-feeding c Ind. Long Term Goal 2: Pt will complete grooming tasks c Mod I.  Long Term Goal 3: Pt will complete total body bathing c Mod I.  Long Term Goal 4: Pt will complete UB dressing c Ind. Long Term Goal 5: Pt will complete LB dressing c Mod I. Additional Goals?: Yes  Long Term Goal 6: Pt will doff/don footwear c Mod I.  Long Term Goal 7: Pt will complete toileting c Mod I.  Long Term Goal 8: Pt will perform functional transfers (bed, chair, toilet, shower) c DME PRN and Mod I.  Long Term Goal 9: Pt will perform therex/therax to facilitate increased strength/endurance/ax tolerance (c emphasis on dynamic standing balance/tolerance > 8 mins) c Mod I.  Long Term Goal 10: Pt will complete home management tasks c Mod I. :                                       Eating: Eating  Assistance Needed: Independent  Comment: X  CARE Score: 6  Discharge Goal: Independent       Oral Hygiene: Oral Hygiene  Assistance Needed: Independent  Comment:  Mod I seated sinkside  CARE Score: 6  Discharge Goal: Independent    UB/LB Bathing: Shower/Bathe Self  Assistance Needed: Supervision or touching assistance  Comment: Sup for safety in stance, seated ,ajority of shower  CARE Score: 4  Discharge Goal: Independent    UB Dressing: Upper Body Dressing  Assistance Needed: Independent  Comment: X  CARE Score: 6  Discharge Goal: Independent         LB Dressing: Lower Body Dressing  Assistance Needed: Supervision or touching assistance  Comment: Sup in stance managing pants over hips  CARE Score: 4  Discharge Goal: Independent    Donning and [place] Footwear: Putting On/Taking Off Footwear  Assistance Needed: Independent  Comment: Ind using no AE  CARE Score: 6  Discharge Goal: Independent      Toiletin [address] needed: Supervision or touching assistance  Comment: Sup  CARE Score: 4  Discharge Goal: Independent      Toilet Transfers: Toilet Transfer  Assistance needed: Supervision or touching assistance  Comment: Sup  CARE Score: 4  Discharge Goal: Independent    Physical Therapy:         Long Term Goals  Time Frame for Long term goals : 10 tx days:  Long term goal 1: Pt will complete bed mobility (scooting, rolling R/L, and sup<->sit) Ind.  Long term goal 2: Pt will complete OOB transfers Mod Ind-Ind. Long term goal 3: Pt will ambulate 150 ft over level surface using LRAD prn Mod Ind-Ind. Long term goal 4: Pt will ambulate at least 10 ft of uneven surface and ascend/descend curb step using LRAD with SBA-Sup. Long term goal 5: Pt will ascend/descend 1 flight of stairs using railings with SBA. Long term goal 6: Pt will complete object retrieval from the floor using reacher Mod Ind.       Bed Mobility:   Sit to Lying  Assistance Needed: Independent  Comment: Practiced in regular flat bed without rails  CARE Score: 6  Discharge Goal: Independent  Roll Left and Right  Assistance Needed: Independent  Comment: Practiced in regular flat bed without rails  CARE Score: 6  Discharge Goal: Independent  Lying to Sitting on Side of Bed  Assistance Needed: Independent  Comment: Practiced in regular flat bed without rails  CARE Score: 6  Discharge Goal: Independent    Transfers:    Sit to Stand  Assistance Needed: Supervision or touching assistance  Comment: SBA using 2WW  CARE Score: 4  Discharge Goal: Independent  Chair/Bed-to-Chair Transfer  Assistance Needed: Supervision or touching assistance  Comment: SBA using 2WW  CARE Score: 4  Discharge Goal: Independent     Car Transfer  Assistance Needed: Supervision or touching assistance  Comment: SBA with RW. Pt requires assist for O2 line management. CARE Score: 4  Discharge Goal: Independent    Ambulation:    Walking Ability  Does the Patient Walk?: Yes     Walk 10 Feet  Assistance Needed: Supervision or touching assistance  Comment: SBA using 2WW  CARE Score: 4  Discharge Goal: Independent     Walk 50 Feet with Two Turns  Assistance Needed: Supervision or touching assistance  Comment: SBA using 2WW  Reason if not Attempted: Not attempted due to medical condition or safety concerns  CARE Score: 4  Discharge Goal: Independent     Walk 150 Feet  Assistance Needed: Supervision or touching assistance  Comment: SBA using 2WW  Reason if not Attempted: Not attempted due to medical condition or safety concerns  CARE Score: 4  Discharge Goal: Independent     Walking 10 Feet on Uneven Surfaces  Assistance Needed: Supervision or touching assistance  Comment: CGA with RW. Pt requires assist for O2 line management. CARE Score: 4  Discharge Goal: Supervision or touching assistance     1 Step (Curb)  Assistance Needed: Supervision or touching assistance  Comment: CGA with RW. Pt requires assist for O2 line management. (4\" step)  CARE Score: 4  Discharge Goal: Supervision or touching assistance     4 Steps  Assistance Needed: Supervision or touching assistance  Comment: SBA with 2 rails. Pt required assist for O2 line management. CARE Score: 4  Discharge Goal: Supervision or touching assistance     12 Steps  Assistance Needed: Supervision or touching assistance  Comment: SBA with 2 rails. Pt required assist for O2 line management.   Reason if not Attempted: Not attempted due to medical condition or safety concerns  CARE Score: 4  Discharge Goal: Supervision or touching assistance       Wheelchair:  w/c Ability: Wheelchair Ability  Uses a Wheelchair and/or Scooter?: No                Balance:        Object: Picking Up Object  Assistance Needed: Supervision or touching assistance  Comment: SBA with RW. Pt used reacher. CARE Score: 4  Discharge Goal: Independent    I      Exam:    Blood pressure (!) 131/95, pulse 78, temperature 97.7 °F (36.5 °C), temperature source Oral, resp. rate 16, height 5' 7\" (1.702 m), weight 167 lb 12.3 oz (76.1 kg), SpO2 91 %. General: Up in wheelchair. Requires cueing to maintain good posture. Dependent on others for propelling the chair. HEENT: Dysarthric. Neck supple. Gazing right and left. Pulmonary: Faint rhonchi in the bases. No coughing. Cardiac: Regular rate and rhythm. Abdomen: Patient's abdomen is soft and nondistended. Bowel sounds were present throughout. There was no rebound, guarding or masses noted. Upper extremities: Keeps his hands together in the midline while seated. Lower extremities: No new swelling. Hypertonicity persists. Sitting balance was fair-.  Standing balance was poor.     Lab Results   Component Value Date    WBC 8.6 04/22/2022    HGB 14.7 04/22/2022    HCT 44.5 04/22/2022    MCV 91.9 04/22/2022     04/22/2022     Lab Results   Component Value Date    INR 1.13 12/02/2019    INR 1.08 11/30/2019    PROTIME 12.9 12/02/2019    PROTIME 12.3 11/30/2019     Lab Results   Component Value Date    CREATININE 0.8 (L) 04/22/2022    BUN 12 04/22/2022     04/22/2022    K 3.7 04/22/2022     04/22/2022    CO2 25 04/22/2022     Lab Results   Component Value Date    ALT 33 04/20/2022    AST 21 04/20/2022    ALKPHOS 94 04/20/2022    BILITOT 0.7 04/20/2022       Expected length of stay  prior to a supervised level of function for discharge home with a walker and HHC OT/PT is 5/3/2022. Recommendations:    1. Acute respiratory failure with gait dysfunction:   Reasonable tolerance for the daily occupational and physical therapy.  He is benefiting from aggressive pulmonary hygiene measures, supplemental oxygen to keep saturations greater than 90%. [facility] has completed a full course of the Tamiflu.    Working on weaning his oxygen supplementation. Some urinary urgency.  No bowel incontinence.  Ongoing duo nebs, cough medicine and adaptive equipment training.  Verbal cues and minimum physical assistance for transfers again today. 2. DVT prophylaxis: Lovenox 40 mg subcu daily.  I must monitor his hemoglobin and platelet count periodically while on this medication.  Weightbearing activities are slowly improving daily.  GI prophylaxis provided.  No new bruising or swelling. 3. Seizure disorder: Tolerating his home AED med of Keppra 750 mg twice daily.  Avoiding exhaustion.  Regulating sleep-wake schedule.  No clinical evidence of active seizures. 4. Hypertension: Norvasc to manage his blood pressure.  Target systolic blood pressure is 120-140.  Vital signs are checked at rest and with activity.  Blood pressure is essentially within target range again today.   5. Depression with anxiety: 40 mg daily.  Sleeping a little better.  Involvement of his group home staff when possible.  Fair sleep reported. [place] Leaver in treatment.

## 2022-04-28 NOTE — PROGRESS NOTES
Occupational Therapy    Physical Rehabilitation: OCCUPATIONAL THERAPY     [x] daily progress note       [] discharge       Patient Name:  Scarlett Marie   :  1965 MRN: [id number]  Room:  [address] [place] [address] Date of Admission: 2022  Rehabilitation Diagnosis:   Acute respiratory failure with hypoxia [J96.01]  Acute respiratory failure with hypoxemia (HCC) [J96.01]       Date 2022       Day of ARU Week:  1   Time IN/OUT 0830/0940   Individual Tx Minutes 70   Group Tx Minutes    Co-Treat Minutes    Concurrent Tx Minutes    TOTAL Tx Time Mins 70   Variance Time +10   Variance Time []   Refusal due to:     []   Medical hold/reason:    []   Illness   []   Off Unit for test/procedure  [x]   Extra time needed to complete task  []   Therapeutic need  []   Other (specify):   Restrictions Restrictions/Precautions: Fall Risk,Seizure,General Precautions (Droplet precautions; R hand contracture, CP)         Communication with other providers: [x]   OK to see per nursing:     []   Spoke with team member regarding:      Subjective observations and cognitive status:  Pt sitting up in bed, finishing breakfast upon entrance. Pt pleasant and agreeable to therapy. Pain level/location:    /10       Location: No pain   Discharge recommendations  Anticipated discharge date:    Destination: []home alone   []home alone w assist prn   [] home w/ family    [] Continuous supervision       []SNF    [] Assisted living     [x] Other: Group home   Continued therapy: [x]HHC OT  []OUTPATIENT  OT   [] No Further OT  Equipment needs: None       ADLs:    Eating: Eating  Assistance Needed: Independent  Comment: X  CARE Score: 6  Discharge Goal: Independent       Oral Hygiene: Oral Hygiene  Assistance Needed: Independent  Comment:  Mod I seated sinkside  CARE Score: 6  Discharge Goal: Independent    UB/LB Bathing: Shower/Bathe Self  Assistance Needed: Supervision or touching assistance  Comment: Sup in stance, seated majority of shower  CARE Score: 4  Discharge Goal: Independent    UB Dressing: Upper Body Dressing  Assistance Needed: Independent  Comment: X  CARE Score: 6  Discharge Goal: Independent         LB Dressing: Lower Body Dressing  Assistance Needed: Independent  Comment: Pt able to retrieve clothing from closet and complete LB dressing Mod I.  CARE Score: 6  Discharge Goal: Independent    Donning and [place] Footwear: Putting On/Taking Off Footwear  Assistance Needed: Independent  Comment: Ind using no AE  CARE Score: 6  Discharge Goal: Independent      Toileting: Toileting Hygiene  Assistance needed: Independent  Comment: Mod I for seated perineal hygiene after BM and clothing management in stance. CARE Score: 6  Discharge Goal: Independent      Toilet Transfers: Toilet Transfer  Assistance needed: Supervision or touching assistance  Comment: Supervision d/t requiring cue for safety c 2WW  CARE Score: 4  Discharge Goal: Independent  Device Used:    [x]   Standard Toilet         [x]   Grab Bars           []  Bedside Commode       []   Elevated Toilet          []   Other:        Bed Mobility:           []   Pt received out of bed   Supine --> Sit:  Mod I  Sit --> Supine: Mod I    Transfers:    Sit--> Stand: Mod I  Stand --> Sit:   Mod I  Stand-Pivot:   Sup d/t requiring one verbal cue for walker safety pivoting to toilet. Other:    Assistive device required for transfer:   2WW      Functional Mobility:    Assistance: Mod I from closet to bathroom. Device:   [x]   Rolling Walker     []   Standard Jeananne Garre []   Wheelchair        []   Asa Held       []   4-Wheeled Jeananne Garre         []   Cardiac Jeananne Garre       []   Other:        Homemaking Tasks:   Patient retrieves clothing from closet to transport items to bathroom at [employer] level c Sup.     Additional Therapeutic activities/exercises completed this date:     [x]   ADL Training   [x]   Balance/Postural training     [x]   Bed/Transfer Training   [x]   Endurance Training   [] Neuromuscular Re-ed   []   Nu-step:  Time:        Level:         #Steps:       []   Rebounder:    []  Seated     []  Standing        []   Supine Ther Ex (reps/sets):     []   Seated Ther Ex (reps/sets):     []   Standing Ther Ex (reps/sets):     []   Other:      Comments: All intervention performed to increase pt's endurance, ax tolerance, balance, and I c ADLs/IADLs and functional transfers/mobility. Patient/Caregiver Education and Training:   []   [employer] Equipment Use  [x]   Bed Mobility/Transfer Technique/Safety  []   Energy Conservation Tips  []   Family training  []   Postural Awareness  [x]   Safety During Functional Activities  []   Reinforced Patient's Precautions   []   Progress was updated and reviewed in Rehabtracker with patient and/or family this         date. Treatment Plan for Next Session: Continue OT POC, progress all ADLs to Mod I      Assessment: This pt demonstrated a positive response to today's treatment as evidenced by motivation to complete full shower. The patient is making good progress toward established goals as evidenced by QI scores. Ongoing deficits are observed in the areas of safety awareness, functional balance, and endurance and continued focus on this is recommended.        Treatment/Activity Tolerance:   [x] Tolerated treatment with no adverse effects    [] Patient limited by fatigue  [] Patient limited by pain   [] Patient limited by medical complications:    [] Adverse reaction to Tx:   [] Significant change in status    Safety:       []  bed alarm set    [x]  chair alarm set    []  Pt refused alarms                []  Telesitter activated      [x]  Gait belt used during tx session      []other:       Number of Minutes/Billable Intervention  Therapeutic Exercise    ADL Self-care 70   Neuro Re-Ed    Therapeutic Activity    Group    Other:    TOTAL 70       Social History  Social/Functional History  Lives With: Other (comment) (Pt lives in a group home.)  Type of Home: Assisted living  Home Layout: One level  Home Access: Level entry  Bathroom Shower/Tub: Walk-in shower,Shower chair with back  Bathroom Toilet: Standard  Bathroom Equipment: Shower chair,Grab bars in shower,Grab bars around [facility] [place]  Bathroom Accessibility: Accessible  Home Equipment:  (No AD)  Has the patient had two or more falls in the past year or any fall with injury in the past year?: Yes (Pt reports 4-5 falls in the last year c no significant injuries.)  Receives Help From: Personal care attendant (Group home staff cook meals and completes majority of the housework.)  ADL Assistance: [address] [place] [address] Avenue: Independent  Homemaking Responsibilities: Yes  Laundry Responsibility: Primary  Cleaning Responsibility: Secondary (Pt reports he cleans his own dishes.)  Ambulation Assistance: Independent  Transfer Assistance: Independent  Active : No  Patient's  Info: DD services Aamir Handsome of Transportation: [facility]  Occupation: Part time employment  Type of Occupation: [employer], 4 days a week 3hrs  Leisure & Hobbies: Pt enjoys watching TV/sports and playing cards. Additional Comments: Pt reports he has a flat bed at home. Objective                                                                                    Goals:  (Update in navigator)  Short Term Goals  Time Frame for Short term goals: STGs=LTGs:  Long Term Goals  Time Frame for Long term goals : 7-10 days or until d/c. Long Term Goal 1: Pt will complete self-feeding c Ind. Long Term Goal 2: Pt will complete grooming tasks c Mod I.  Long Term Goal 3: Pt will complete total body bathing c Mod I.  Long Term Goal 4: Pt will complete UB dressing c Ind. Long Term Goal 5: Pt will complete LB dressing c Mod I.   Additional Goals?: Yes  Long Term Goal 6: Pt will doff/don footwear c Mod I.  Long Term Goal 7: Pt will complete toileting c Mod I.  Long Term Goal 8: Pt will perform functional transfers (bed, chair, toilet, shower) c DME PRN and Mod I.  Long Term Goal 9: Pt will perform therex/therax to facilitate increased strength/endurance/ax tolerance (c emphasis on dynamic standing balance/tolerance > 8 mins) c Mod I.  Long Term Goal 10: Pt will complete home management tasks c Mod I.:        Plan of Care                                                                              Times per week: 5 days per week for a minimum of 60 minutes/day plus group as appropriate for 60 minutes.   Treatment to include Plan  Times per Day: Daily  Current Treatment Recommendations: Strengthening,Balance training,Functional mobility training,Endurance training,Neuromuscular re-education,Safety education & training,Patient/Caregiver education & training,Equipment evaluation, education, & procurement,Self-Care / ADL,Home management training    Electronically signed by   DAYSI Mancuso, OTR/L  4/28/2022, 9:30 AM

## 2022-04-29 PROCEDURE — 97110 THERAPEUTIC EXERCISES: CPT

## 2022-04-29 PROCEDURE — 6370000000 HC RX 637 (ALT 250 FOR IP): Performed by: PHYSICAL MEDICINE & REHABILITATION

## 2022-04-29 PROCEDURE — 1280000000 HC REHAB R&B

## 2022-04-29 PROCEDURE — 99232 SBSQ HOSP IP/OBS MODERATE 35: CPT | Performed by: PHYSICAL MEDICINE & REHABILITATION

## 2022-04-29 PROCEDURE — 97116 GAIT TRAINING THERAPY: CPT

## 2022-04-29 PROCEDURE — 6360000002 HC RX W HCPCS: Performed by: INTERNAL MEDICINE

## 2022-04-29 PROCEDURE — 94761 N-INVAS EAR/PLS OXIMETRY MLT: CPT

## 2022-04-29 PROCEDURE — 97535 SELF CARE MNGMENT TRAINING: CPT

## 2022-04-29 PROCEDURE — 97530 THERAPEUTIC ACTIVITIES: CPT

## 2022-04-29 PROCEDURE — 6370000000 HC RX 637 (ALT 250 FOR IP): Performed by: INTERNAL MEDICINE

## 2022-04-29 RX ADMIN — BENZONATATE 100 MG: 100 CAPSULE ORAL at 08:53

## 2022-04-29 RX ADMIN — BENZONATATE 100 MG: 100 CAPSULE ORAL at 15:49

## 2022-04-29 RX ADMIN — PAROXETINE HYDROCHLORIDE 40 MG: 20 TABLET, FILM COATED ORAL at 08:53

## 2022-04-29 RX ADMIN — ENOXAPARIN SODIUM 40 MG: 100 INJECTION SUBCUTANEOUS at 08:54

## 2022-04-29 RX ADMIN — BENZONATATE 100 MG: 100 CAPSULE ORAL at 19:44

## 2022-04-29 RX ADMIN — LEVETIRACETAM 750 MG: 500 TABLET, FILM COATED ORAL at 08:53

## 2022-04-29 RX ADMIN — AMLODIPINE BESYLATE 10 MG: 10 TABLET ORAL at 08:54

## 2022-04-29 RX ADMIN — GUAIFENESIN 600 MG: 600 TABLET, EXTENDED RELEASE ORAL at 19:44

## 2022-04-29 RX ADMIN — GUAIFENESIN 600 MG: 600 TABLET, EXTENDED RELEASE ORAL at 08:54

## 2022-04-29 RX ADMIN — LEVOTHYROXINE SODIUM 25 MCG: 0.05 TABLET ORAL at 05:31

## 2022-04-29 RX ADMIN — LEVETIRACETAM 750 MG: 500 TABLET, FILM COATED ORAL at 19:44

## 2022-04-29 NOTE — PROGRESS NOTES
Daira Edouard    : 1965  Acct #: [de-identified]  MRN: [id number]              PM&R Progress Note      Admitting diagnosis: Acute respiratory failure ([address])     Comorbid diagnoses impacting rehabilitation: Influenza A pneumonia, hypoxemia, generalized weakness, gait disturbance, seizure disorder, essential hypertension, cerebral palsy     Chief complaint: No new shortness of breath as his oxygen is being weaned. Prior (baseline) level of function: Independent. Current level of function:         Current  IRF-BENNETT and Goals:   Occupational Therapy:    Short Term Goals  Time Frame for Short term goals: STGs=LTGs :   Long Term Goals  Time Frame for Long term goals : 7-10 days or until d/c. Long Term Goal 1: Pt will complete self-feeding c Ind. Long Term Goal 2: Pt will complete grooming tasks c Mod I.  Long Term Goal 3: Pt will complete total body bathing c Mod I.  Long Term Goal 4: Pt will complete UB dressing c Ind. Long Term Goal 5: Pt will complete LB dressing c Mod I. Additional Goals?: Yes  Long Term Goal 6: Pt will doff/don footwear c Mod I.  Long Term Goal 7: Pt will complete toileting c Mod I.  Long Term Goal 8: Pt will perform functional transfers (bed, chair, toilet, shower) c DME PRN and Mod I.  Long Term Goal 9: Pt will perform therex/therax to facilitate increased strength/endurance/ax tolerance (c emphasis on dynamic standing balance/tolerance > 8 mins) c Mod I.  Long Term Goal 10: Pt will complete home management tasks c Mod I. :                                       Eating: Eating  Assistance Needed: Independent  Comment: X  CARE Score: 6  Discharge Goal: Independent       Oral Hygiene: Oral Hygiene  Assistance Needed: Independent  Comment:  Mod I seated sinkside  CARE Score: 6  Discharge Goal: Independent    UB/LB Bathing: Shower/Bathe Self  Assistance Needed: Supervision or touching assistance  Comment: Sup in stance, seated majority of shower  CARE Score: 4  Discharge Goal: Independent    UB Dressing: Upper Body Dressing  Assistance Needed: Independent  Comment: X  CARE Score: 6  Discharge Goal: Independent         LB Dressing: Lower Body Dressing  Assistance Needed: Independent  Comment: Pt able to retrieve clothing from closet and complete LB dressing Mod I.  CARE Score: 6  Discharge Goal: Independent    Donning and [place] Footwear: Putting On/Taking Off Footwear  Assistance Needed: Independent  Comment: Ind using no AE  CARE Score: 6  Discharge Goal: Independent      Toileting: Toileting Hygiene  Assistance needed: Independent  Comment: Mod I for seated perineal hygiene after BM and clothing management in stance. CARE Score: 6  Discharge Goal: Independent      Toilet Transfers: Toilet Transfer  Assistance needed: Supervision or touching assistance  Comment: Supervision d/t requiring cue for safety c 2WW  CARE Score: 4  Discharge Goal: Independent    Physical Therapy:         Long Term Goals  Time Frame for Long term goals : 10 tx days:  Long term goal 1: Pt will complete bed mobility (scooting, rolling R/L, and sup<->sit) Ind.  Long term goal 2: Pt will complete OOB transfers Mod Ind-Ind. Long term goal 3: Pt will ambulate 150 ft over level surface using LRAD prn Mod Ind-Ind. Long term goal 4: Pt will ambulate at least 10 ft of uneven surface and ascend/descend curb step using LRAD with SBA-Sup. Long term goal 5: Pt will ascend/descend 1 flight of stairs using railings with SBA. Long term goal 6: Pt will complete object retrieval from the floor using reacher Mod Ind.       Bed Mobility:   Sit to Lying  Assistance Needed: Independent  Comment: Practiced in regular flat bed without rails  CARE Score: 6  Discharge Goal: Independent  Roll Left and Right  Assistance Needed: Independent  Comment: Practiced in regular flat bed without rails  CARE Score: 6  Discharge Goal: Independent  Lying to Sitting on Side of Bed  Assistance Needed: Independent  Comment: Practiced in regular flat bed without rails  CARE Score: 6  Discharge Goal: Independent    Transfers:    Sit to Stand  Assistance Needed: Supervision or touching assistance  Comment: SBA using 2WW  CARE Score: 4  Discharge Goal: Independent  Chair/Bed-to-Chair Transfer  Assistance Needed: Supervision or touching assistance  Comment: SBA using 2WW  CARE Score: 4  Discharge Goal: Independent     Car Transfer  Assistance Needed: Supervision or touching assistance  Comment: SBA with RW. Pt requires assist for O2 line management. CARE Score: 4  Discharge Goal: Independent    Ambulation:    Walking Ability  Does the Patient Walk?: Yes     Walk 10 Feet  Assistance Needed: Supervision or touching assistance  Comment: SBA using 2WW  CARE Score: 4  Discharge Goal: Independent     Walk 50 Feet with Two Turns  Assistance Needed: Supervision or touching assistance  Comment: SBA using 2WW  Reason if not Attempted: Not attempted due to medical condition or safety concerns  CARE Score: 4  Discharge Goal: Independent     Walk 150 Feet  Assistance Needed: Supervision or touching assistance  Comment: SBA using 2WW  Reason if not Attempted: Not attempted due to medical condition or safety concerns  CARE Score: 4  Discharge Goal: Independent     Walking 10 Feet on Uneven Surfaces  Assistance Needed: Supervision or touching assistance  Comment: CGA with RW. Pt requires assist for O2 line management. CARE Score: 4  Discharge Goal: Supervision or touching assistance     1 Step (Curb)  Assistance Needed: Supervision or touching assistance  Comment: CGA with RW. Pt requires assist for O2 line management. (4\" step)  CARE Score: 4  Discharge Goal: Supervision or touching assistance     4 Steps  Assistance Needed: Supervision or touching assistance  Comment: SBA with 2 rails. Pt required assist for O2 line management.   CARE Score: 4  Discharge Goal: Supervision or touching assistance     12 Steps  Assistance Needed: Supervision or touching assistance  Comment: SBA with 2 rails. Pt required assist for O2 line management. Reason if not Attempted: Not attempted due to medical condition or safety concerns  CARE Score: 4  Discharge Goal: Supervision or touching assistance       Wheelchair:  w/c Ability: Wheelchair Ability  Uses a Wheelchair and/or Scooter?: No                Balance:        Object: Picking Up Object  Assistance Needed: Supervision or touching assistance  Comment: SBA with RW. Pt used reacher. CARE Score: 4  Discharge Goal: Independent    I      Exam:    Blood pressure (!) 136/94, pulse 84, temperature 97.7 °F (36.5 °C), temperature source Oral, resp. rate 16, height 5' 7\" (1.702 m), weight 163 lb 9.3 oz (74.2 kg), SpO2 94 %. General: Observed sitting up in a bedside chair. Engaged in some personal activities. Alert. In no distress. HEENT: Gazing right and left. MMM. Passive in conversation. Pulmonary: Shallow respirations without wheezes or rales. Cardiac: RRR. Abdomen: Patient's abdomen is soft and nondistended. Bowel sounds were present throughout. There was no rebound, guarding or masses noted. Upper extremities: Slow and deliberate and clumsy with arm movements. Functional  strength bilaterally. Lower extremities: Increased tone across the knees and ankles. Coarse movements but he can reposition his legs spontaneously. Sitting balance was good. Standing balance was poor.     Lab Results   Component Value Date    WBC 7.7 04/28/2022    HGB 15.1 04/28/2022    HCT 44.3 04/28/2022    MCV 91.9 04/28/2022     04/28/2022     Lab Results   Component Value Date    INR 1.13 12/02/2019    INR 1.08 11/30/2019    PROTIME 12.9 12/02/2019    PROTIME 12.3 11/30/2019     Lab Results   Component Value Date    CREATININE 0.8 (L) 04/28/2022    BUN 9 04/28/2022     04/28/2022    K 3.6 04/28/2022     04/28/2022    CO2 26 04/28/2022     Lab Results   Component Value Date    ALT 36 04/28/2022    AST 20 04/28/2022 ALKPHOS 90 04/28/2022    BILITOT 0.6 04/28/2022       Expected length of stay  prior to a supervised level of function for discharge home with a walker and C OT/PT is 5/3/2022. Recommendations:    1. Acute respiratory failure with gait dysfunction: Sometimes needs significant cueing to engage in the daily occupational and physical therapy.  Nonspecific complaints are sometimes used to try to get out of therapy. Amanda Ennis is benefiting from aggressive pulmonary hygiene measures, supplemental oxygen to keep saturations greater than 90%. Amanda Ennis has completed a full course of the Tamiflu.    Weaning the oxygen supplementation.  Some urinary urgency.  No bowel incontinence.  Ongoing duo nebs, cough medicine and adaptive equipment training.  Verbal cues and CGA-minimum physical assistance for transfers today. 2. DVT prophylaxis: Lovenox 40 mg subcu daily.  I must monitor his hemoglobin and platelet count periodically while on this medication.  Weightbearing activities are slowly improving daily.  GI prophylaxis provided.  No clinical signs of blood loss.   3. Seizure disorder: Tolerating his home AED med of Keppra 750 mg twice daily.  Consistent alertness seems to indicate he is adjusting to these meds.   Regulating sleep-wake schedule.  No clinical evidence of active seizures. 4. Hypertension: Norvasc to manage his blood pressure.  Target systolic blood pressure is 120-140.  Vital signs are checked at rest and with activity.  Blood pressure is essentially within target range again today.   5. Depression with anxiety: 40 mg daily.  Sleeping a little better.  Involvement of his group home staff when possible.  Fair sleep reported. Dave Sawant in treatment.

## 2022-04-29 NOTE — PROGRESS NOTES
Pulmonary and Critical Care  Progress Note    Subjective: The patient is sitting in the chair. Having PT. On RA. Shortness of breath better. Chest pain none. Addressing respiratory complaints Patient is negative for  hemoptysis and cyanosis. CONSTITUTIONAL:  negative for fevers and chills. Past Medical History:     has a past medical history of Cerebral palsy ([address] Depression, Hypertension, and Seizures ([facility] [address] has a past surgical history that includes Eye surgery; Abdomen surgery; and Upper gastrointestinal endoscopy (N/A, 12/3/2019). reports that he has never smoked. He has never used smokeless tobacco. He reports that he does not drink alcohol and does not use drugs. Family history:  family history is not on file. Allergies   Allergen Reactions    Pcn [Penicillins]      Social History:    Reviewed; no changes    Objective:   PHYSICAL EXAM:        VITALS:  /72   Pulse 81   Temp 98.1 °F (36.7 °C) (Oral)   Resp 16   Ht 5' 7\" (1.702 m)   Wt 165 lb 12.6 oz (75.2 kg)   SpO2 94%   BMI 25.97 kg/m²     24HR INTAKE/OUTPUT:      Intake/Output Summary (Last 24 hours) at 4/29/2022 1037  Last data filed at 4/29/2022 1010  Gross per 24 hour   Intake 680 ml   Output 850 ml   Net -170 ml       CONSTITUTIONAL:  Awake. LUNGS:  decreased breath sounds, occ basilar crackles. CARDIOVASCULAR:  normal S1 and S2 and negative JVD  ABD:Abdomen soft, non-tender. BS normal. No masses,  No organomegaly. [facility]. DATA:    CBC:  Recent Labs     04/28/22  0559   WBC 7.7   RBC 4.82   HGB 15.1   HCT 44.3      MCV 91.9   MCH 31.3*   MCHC 34.1   RDW 11.7   SEGSPCT 44.3      BMP:  Recent Labs     04/28/22  0559      K 3.6      CO2 26   BUN 9   CREATININE 0.8*   CALCIUM 8.6   GLUCOSE 86      ABG:  No results for input(s): PH, PO2ART, SRM5OYS, HCO3, BEART, O2SAT in the last 72 hours.   Lab Results   Component Value Date    PROBNP 25.54 04/19/2022    PROBNP 26.35 04/17/2022    PROBNP 35.21 06/08/2021     No results found for: [facility]    Radiology Review:  Pertinent images / reports were reviewed as a part of this visit. Assessment:     Patient Active Problem List   Diagnosis    Pneumonia    Cerebral palsy, hemiplegic (HCC)    Hypoxia    Seizure disorder ([address]    Gait disturbance    Pneumonia of both lungs due to infectious organism    Acute upper GI bleed    Cecal polyp    Sebaceous cyst    Acute respiratory failure with hypoxemia (HCC)    Influenza A    Generalized weakness    Essential hypertension    Spastic quadriplegic cerebral palsy ([address]       Plan:   1. Overall the patient has improved. 2. Inc. Activity.   3. Gale Long MD   4/29/2022  10:37 AM

## 2022-04-29 NOTE — PROGRESS NOTES
Physical Rehabilitation: OCCUPATIONAL THERAPY     [x] daily progress note       [] discharge       Patient Name:  Jovan Dacosta   :  1965 MRN: [id number]  Room:  [address] [place], [place] [address] Date of Admission: 2022  Rehabilitation Diagnosis:   Acute respiratory failure with hypoxia [J96.01]  Acute respiratory failure with hypoxemia (HCC) [J96.01]       Date 2022       Day of ARU Week:  2   Time IN//1000    Individual Tx Minutes 105   Group Tx Minutes    Co-Treat Minutes    Concurrent Tx Minutes    TOTAL Tx Time Mins    Variance Time    Variance Time []   Refusal due to:     []   Medical hold/reason:    []   Illness   []   Off Unit for test/procedure  []   Extra time needed to complete task  []   Therapeutic need  []   Other (specify):   Restrictions Restrictions/Precautions: Fall Risk,Seizure,General Precautions (Droplet precautions; R hand contracture, CP)         Communication with other providers: [x]   OK to see per nursing:     []   Spoke with team member regarding:      Subjective observations and cognitive status: Patient seated EOB, finishing  Breakfast meal, pleasant and agreeable to therapy    Pain level/location:    /10       Location: per patient no pain noted    Discharge recommendations  Anticipated discharge date:  5/3  Destination: []home alone   []home alone w assist prn   [] home w/ family    [] Continuous supervision       []SNF    [] Assisted living     [x] Other:Group home    Continued therapy: []HHC OT  []OUTPATIENT  OT   [] No Further OT  Equipment needs: None        ADLs:    Eating: Eating  Assistance Needed: Independent  Comment: X  CARE Score: 6  Discharge Goal: Independent       Oral Hygiene: Oral Hygiene  Assistance Needed: Independent  Comment: X  CARE Score: 6  Discharge Goal: Independent    UB/LB Bathing: Shower/Bathe Self  Assistance Needed: Independent  Comment:  Mod I seated entirety of shower weight shifts to wash buttocks per patient baseline  CARE Score: 6  Discharge Goal: Independent    UB Dressing: Upper Body Dressing  Assistance Needed: Independent  Comment: X  CARE Score: 6  Discharge Goal: Independent         LB Dressing: Lower Body Dressing  Assistance Needed: Independent  Comment: X  CARE Score: 6  Discharge Goal: Independent    Donning and [place] Footwear: Putting On/Taking Off Footwear  Assistance Needed: Independent  Comment: X  CARE Score: 6  Discharge Goal: Independent      Toileting: Toileting Hygiene  Assistance needed: Independent  Comment: Mod I  CARE Score: 6  Discharge Goal: Independent      Toilet Transfers: Toilet Transfer  Assistance needed: Supervision or touching assistance  Comment: Sup  CARE Score: 4  Discharge Goal: Independent  Device Used:    [x]   Standard Toilet         [x]   Grab Bars           []  Bedside Commode       []   Elevated Toilet          []   Other:        Bed Mobility:           [x]   Pt received out of bed   Rolling R/L:    Scooting:    Supine --> Sit:    Sit --> Supine:      Transfers:    Sit--> Stand:   Mod I   Stand --> Sit:   Sup  Stand-Pivot:   SUP  Other:    Assistive device required for transfer:   RW      Functional Mobility:  Throughout room/bathroom; down hallway of ARU at [facility] from back of therapy gym to recliner in room   Assistance:  Sup in room   Device:   [x]   Rolling Walker     []   Standard Estephania Poser []   Wheelchair        []   Giovany Bald       []   4-Wheeled Estephania Poser         []   Cardiac Estephania Poser       []   Other:        Homemaking Tasks:   patient retrieves clothing from closet and transports to bathroom at [employer] level       Additional Therapeutic activities/exercises completed this date:     [x]   ADL Training   [x]   Balance/Postural training     [x]   Bed/Transfer Training   [x]   Endurance Training patient instructed in bilateral reciprocal patterned movement for 5 minutes in stance, 10 seated, with mod resistance and no rest breaks to increase strength and activity tolerance to facilitate ADL/IADL tasks upon dc to group home    []   Neuromuscular Re-ed   []   Nu-step:  Time:        Level:         #Steps:       [x]   Rebounder:    []  Seated     [x]  Standing patient completes 3 sets of 25 in stance with focus on balance, posture, and pacing techniques         []   Supine Ther Ex (reps/sets):     []   Seated Ther Ex (reps/sets):     []   Standing Ther Ex (reps/sets):     []   Other:      Comments:      Patient/Caregiver Education and Training:   [x]   [employer] Equipment Use  [x]   Bed Mobility/Transfer Technique/Safety  [x]   Energy Conservation Tips  []   Family training  [x]   Postural Awareness  [x]   Safety During Functional Activities  []   Reinforced Patient's Precautions   []   Progress was updated and reviewed in Rehabtracker with patient and/or family this         date.     Treatment Plan for Next Session: POC to continue as tolerated          Treatment/Activity Tolerance:   [x] Tolerated treatment with no adverse effects    [] Patient limited by fatigue  [] Patient limited by pain   [] Patient limited by medical complications:    [] Adverse reaction to Tx:   [] Significant change in status    Safety:       []  bed alarm set    [x]  chair alarm set    []  Pt refused alarms                []  Telesitter activated      [x]  Gait belt used during tx session      []other:       Number of Minutes/Billable Intervention  Therapeutic Exercise 15   ADL Self-care 60   Neuro Re-Ed    Therapeutic Activity 30   Group    Other:    TOTAL 105       Social History  Social/Functional History  Lives With: Other (comment) (Pt lives in a group home.)  Type of Home: Assisted living  Home Layout: One level  Home Access: Level entry  Bathroom Shower/Tub: Walk-in shower,Shower chair with back  Bathroom Toilet: Standard  Bathroom Equipment: Shower chair,Grab bars in shower,Grab bars around [employer]  Bathroom Accessibility: Accessible  Home Equipment:  (No AD)  Has the patient had two or more falls in the past year or any fall with injury in the past year?: Yes (Pt reports 4-5 falls in the last year c no significant injuries.)  Receives Help From: Personal care attendant (Group home staff cook meals and completes majority of the housework.)  ADL Assistance: [address]: Independent  Homemaking Responsibilities: Yes  Laundry Responsibility: Primary  Cleaning Responsibility: Secondary (Pt reports he cleans his own dishes.)  Ambulation Assistance: Independent  Transfer Assistance: Independent  Active : No  Patient's  Info: DD services Ileene Sers of Transportation: [employer]  Occupation: Part time employment  Type of Occupation: [employer], 4 days a week 3hrs  Leisure & Hobbies: Pt enjoys watching TV/sports and playing cards. Additional Comments: Pt reports he has a flat bed at home. Objective                                                                                    Goals:  (Update in navigator)  Short Term Goals  Time Frame for Short term goals: STGs=LTGs:  Long Term Goals  Time Frame for Long term goals : 7-10 days or until d/c. Long Term Goal 1: Pt will complete self-feeding c Ind. Long Term Goal 2: Pt will complete grooming tasks c Mod I.  Long Term Goal 3: Pt will complete total body bathing c Mod I.  Long Term Goal 4: Pt will complete UB dressing c Ind. Long Term Goal 5: Pt will complete LB dressing c Mod I. Additional Goals?: Yes  Long Term Goal 6: Pt will doff/don footwear c Mod I.  Long Term Goal 7: Pt will complete toileting c Mod I.  Long Term Goal 8: Pt will perform functional transfers (bed, chair, toilet, shower) c DME PRN and Mod I.  Long Term Goal 9: Pt will perform therex/therax to facilitate increased strength/endurance/ax tolerance (c emphasis on dynamic standing balance/tolerance > 8 mins) c Mod I.  Long Term Goal 10:  Pt will complete home management tasks c Mod I.:        Plan of Care                                                                              Times per week: 5 days per week for a minimum of 60 minutes/day plus group as appropriate for 60 minutes.   Treatment to include Plan  Times per Day: Daily  Current Treatment Recommendations: Strengthening,Balance training,Functional mobility training,Endurance training,Neuromuscular re-education,Safety education & training,Patient/Caregiver education & training,Equipment evaluation, education, & procurement,Self-Care / ADL,Home management training    Electronically signed by   ITALO Fishman,  4/29/2022, 9:03 AM

## 2022-04-29 NOTE — CARE COORDINATION
Case mgt spoke with patient's  [facility]. Reviewed patient's current LOF and need for RW at discharge. Case mgt will order RW. Patient previously had [address] [place] [address] Rd and [facility] is agreeable to using the same agency for ot/pt. 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 [facility] asked about patient's return to work and case mgt advised f/u with patient's PCP.

## 2022-04-29 NOTE — PLAN OF CARE
Problem: Discharge Planning  Goal: Discharge to home or other facility with appropriate resources  Outcome: Progressing     Problem: Safety - Adult  Goal: Free from fall injury  Outcome: Progressing     Problem: ABCDS Injury Assessment  Goal: Absence of physical injury  Outcome: Progressing     Problem: Skin/Tissue Integrity  Goal: Absence of new skin breakdown  Description: 1. Monitor for areas of redness and/or skin breakdown  2. Assess vascular access sites hourly  3. Every 4-6 hours minimum:  Change oxygen saturation probe site  4. Every 4-6 hours:  If on nasal continuous positive airway pressure, respiratory therapy assess nares and determine need for appliance change or resting period.   Outcome: Progressing     Problem: Nutrition Deficit:  Goal: Optimize nutritional status  Outcome: Progressing     Problem: Pain  Goal: Verbalizes/displays adequate comfort level or baseline comfort level  Outcome: Progressing

## 2022-04-29 NOTE — FLOWSHEET NOTE
[x] daily progress note       [] discharge       Patient Name:  Gordo Gordon   :  1965 MRN: [id number]  Room:  [address] [place], [place] [address] Date of Admission: 2022  Rehabilitation Diagnosis:   Acute respiratory failure with hypoxia [J96.01]  Acute respiratory failure with hypoxemia (HCC) [J96.01]       Date 2022       Day of ARU Week:  2   Time IN/OUT 3583-9227   Individual Tx Minutes 75   TOTAL Tx Time Mins 75   Variance Time +15 minutes   Variance Time []   Refusal due to:     []   Medical hold/reason:    []   Illness   []   Off Unit for test/procedure  []   Extra time needed to complete task  []   Therapeutic need  [x]   Other (specify): Make-up minutes   Restrictions Restrictions/Precautions  Restrictions/Precautions: Fall Risk,Seizure,General Precautions (Droplet precautions; R hand contracture, CP)  Position Activity Restriction  Other position/activity restrictions: monitor O2   Communication with other providers: [x]   OK to see per nursing:     []   Spoke with team member regarding:      Subjective observations and cognitive status: Pt seen sitting up in recliner at beginning of treatment. Agreeable to therapy. Pain level/location: 0/10      Discharge recommendations  Anticipated discharge date: Expected Discharge Date: 22    Destination: []?????home alone   []?????home alone with assist PRN     []????? home w/ family      []????? Continuous supervision  []??? ??SNF    [x]????? Assisted living     []????? Other:   Continued therapy: [x]??? ? ?[facility]  []?????OUTPATIENT  PT   []????? No Further PT  Equipment needs:Daniel Swanson requires the assistance of a wheeled walker to successfully ambulate from room to room at home to allow completion of daily living tasks such as: bathing, toileting, dressing and grooming.  A wheeled walker is necessary due to the patient's unsteady gait, upper body weakness, inability to  a standard walker.  This patient can ambulate only by pushing a walker instead of using a lesser assistive device such as a cane or crutch. [x]   Pt received out of bed     Transfers:    Sit--> Stand: supervision   Stand --> Sit:  Supervision   Stand pivot transfers: supervision without AD   Assistive device required for transfer:  RW    Gait:    Distance:  562'   Assistance:  Supervision   Device:  RW  Gait Quality:  Step-to pattern; vcs to slow down guera. Stairs   # Completed: 20  Assistance:  Supervision   Supportive Device:  2 rails     Uneven Surfaces:       Assistance:  Supervision   Device:  RW  Surfaces Completed:   [x]  Carpeted Surface with bean bags beneath      []  Throw rugs       []  Ramp       []  Outdoor pavements        []  Grass             []  Loose gravel        []  Other:    Pt amb up/down 4\" curb step SBA with RW. Vcs for walker safety and proper positioning. Additional Therapeutic activities/exercises completed this date:     [x]   Nu-step:  Time: 10 minutes        Level:  4    (for strengthening and endurance training)     []   Rebounder:    []  Seated     []  Standing        []   Balance training         []   Postural training    []   Supine ther ex (reps/sets):     []   Seated ther ex (reps/sets):     [x]   Standing ther ex (reps/sets): marching, hip extension, hip abduction, heel raises, toe raises, mini-squats, knee flexion x 10 reps each for strengthening. []   Picking up object from floor (standing):                   []   Reacher used   []   Other:   []   Other:    Patient/Caregiver Education and Training:   [x]   Bed Mobility/Transfer technique/safety  [x]   Gait technique/sequencing  [x]   Proper use of assistive device  [x]   Advanced mobility safety and technique  []   Reinforced patient's precautions/mobility while maintaining precautions  []   Postural awareness  []   Family training    Treatment Plan for Next Session: QI     Assessment:   This pt demonstrated a positive response to today's treatment as evidenced by improved mobility. The patient is making progress toward established goals as evidenced by QI scores. Ongoing deficits are observed in the areas of strength, balance, endurance, and safety and continued focus on this is recommended. Treatment/Activity Tolerance:   [x] Tolerated treatment with no adverse effects    [] Patient limited by fatigue  [] Patient limited by pain   [] Patient limited by medical complications:    [] Adverse reaction to Tx:   [] Significant change in status    Safety:       []  bed alarm set    [x]  chair alarm set    []  Pt refused alarms                []  Telesitter activated      [x]  Gait belt used during tx session      [x]other: pt left sitting up in recliner with call light at end of treatment.          Number of Minutes/Billable Intervention  Gait Training 45   Therapeutic Exercise 30   Neuro Re-Ed    Therapeutic Activity    Wheelchair Propulsion    Group    Other:    TOTAL 75         Social History  Social/Functional History  Lives With: Other (comment) (Pt lives in a group home.)  Type of Home: Assisted living  Home Layout: One level  Home Access: Level entry  Bathroom Shower/Tub: Walk-in shower,Shower chair with back  Bathroom Toilet: Standard  Bathroom Equipment: Shower chair,Grab bars in shower,Grab bars around [place]  Bathroom Accessibility: [facility]:  (No AD)  Has the patient had two or more falls in the past year or any fall with injury in the past year?: Yes (Pt reports 4-5 falls in the last year c no significant injuries.)  Receives Help From: Personal care attendant (Group home staff cook meals and completes majority of the housework.)  ADL Assistance: Independent  Homemaking Assistance: Independent  Homemaking Responsibilities: Yes  Laundry Responsibility: Primary  Cleaning Responsibility: Secondary (Pt reports he cleans his own dishes.)  Ambulation Assistance: Independent  Transfer Assistance: Independent  Active : No  Patient's  Info: DD services Janine Henry of Transportation: Betsy Delgadillo  Occupation: Part time employment  Type of Occupation: [employer] - , 4 days a week 3hrs  Leisure & Hobbies: Pt enjoys watching TV/sports and playing cards. Additional Comments: Pt reports he has a flat bed at home. Objective                                                                                    Goals:  (Update in navigator)   : Long Term Goals  Time Frame for Long term goals : 10 tx days:  Long term goal 1: Pt will complete bed mobility (scooting, rolling R/L, and sup<->sit) Ind.  Long term goal 2: Pt will complete OOB transfers Mod Ind-Ind. Long term goal 3: Pt will ambulate 150 ft over level surface using LRAD prn Mod Ind-Ind. Long term goal 4: Pt will ambulate at least 10 ft of uneven surface and ascend/descend curb step using LRAD with SBA-Sup. Long term goal 5: Pt will ascend/descend 1 flight of stairs using railings with SBA. Long term goal 6: Pt will complete object retrieval from the floor using reacher Mod Ind.:        Plan of Care                                                                              Times per week: 5 days per week for a minimum of 60 minutes/day plus group as appropriate for 60 minutes.   Treatment to include Current Treatment Recommendations: Strengthening,Endurance training,Safety education & training,Balance training,Functional mobility training,Transfer training,Gait training,Stair training,Return to work related activity,Home exercise program,Patient/Caregiver education & training,Equipment evaluation, education, & procurement,Therapeutic activities    Electronically signed by   Juan Jose Magaña, [id number]  4/29/2022, 11:07 AM

## 2022-04-30 PROCEDURE — 6370000000 HC RX 637 (ALT 250 FOR IP): Performed by: PHYSICAL MEDICINE & REHABILITATION

## 2022-04-30 PROCEDURE — 94150 VITAL CAPACITY TEST: CPT

## 2022-04-30 PROCEDURE — 94761 N-INVAS EAR/PLS OXIMETRY MLT: CPT

## 2022-04-30 PROCEDURE — 6370000000 HC RX 637 (ALT 250 FOR IP): Performed by: INTERNAL MEDICINE

## 2022-04-30 PROCEDURE — 1280000000 HC REHAB R&B

## 2022-04-30 PROCEDURE — 6360000002 HC RX W HCPCS: Performed by: INTERNAL MEDICINE

## 2022-04-30 RX ADMIN — AMLODIPINE BESYLATE 10 MG: 10 TABLET ORAL at 09:09

## 2022-04-30 RX ADMIN — BENZONATATE 100 MG: 100 CAPSULE ORAL at 09:10

## 2022-04-30 RX ADMIN — ENOXAPARIN SODIUM 40 MG: 100 INJECTION SUBCUTANEOUS at 09:10

## 2022-04-30 RX ADMIN — GUAIFENESIN 600 MG: 600 TABLET, EXTENDED RELEASE ORAL at 09:09

## 2022-04-30 RX ADMIN — PAROXETINE HYDROCHLORIDE 40 MG: 20 TABLET, FILM COATED ORAL at 09:09

## 2022-04-30 RX ADMIN — LEVETIRACETAM 750 MG: 500 TABLET, FILM COATED ORAL at 09:09

## 2022-04-30 RX ADMIN — BENZONATATE 100 MG: 100 CAPSULE ORAL at 13:58

## 2022-04-30 RX ADMIN — GUAIFENESIN 600 MG: 600 TABLET, EXTENDED RELEASE ORAL at 20:33

## 2022-04-30 RX ADMIN — BENZONATATE 100 MG: 100 CAPSULE ORAL at 20:33

## 2022-04-30 RX ADMIN — LEVOTHYROXINE SODIUM 25 MCG: 0.05 TABLET ORAL at 05:44

## 2022-04-30 RX ADMIN — LEVETIRACETAM 750 MG: 500 TABLET, FILM COATED ORAL at 20:32

## 2022-04-30 ASSESSMENT — PAIN SCALES - GENERAL: PAINLEVEL_OUTOF10: 0

## 2022-04-30 NOTE — PLAN OF CARE
Problem: Discharge Planning  Goal: Discharge to home or other facility with appropriate resources  4/30/2022 0952 by Stan Lundberg LPN  Outcome: Progressing  4/29/2022 2223 by Douglas Oliva LPN  Outcome: Progressing     Problem: Safety - Adult  Goal: Free from fall injury  4/30/2022 0952 by Stan Lundberg LPN  Outcome: Progressing  4/29/2022 2223 by Douglas Oliva LPN  Outcome: Progressing     Problem: ABCDS Injury Assessment  Goal: Absence of physical injury  4/30/2022 2287 by Stan Lundberg LPN  Outcome: Progressing  4/29/2022 2223 by Douglas Oliva LPN  Outcome: Progressing     Problem: Skin/Tissue Integrity  Goal: Absence of new skin breakdown  Description: 1. Monitor for areas of redness and/or skin breakdown  2. Assess vascular access sites hourly  3. Every 4-6 hours minimum:  Change oxygen saturation probe site  4. Every 4-6 hours:  If on nasal continuous positive airway pressure, respiratory therapy assess nares and determine need for appliance change or resting period.   4/30/2022 4212 by Stan Lundberg LPN  Outcome: Progressing  4/29/2022 2223 by Douglas Oliva LPN  Outcome: Progressing     Problem: Nutrition Deficit:  Goal: Optimize nutritional status  4/30/2022 0952 by Stan Lundberg LPN  Outcome: Progressing  4/29/2022 2223 by Douglas Oliva LPN  Outcome: Progressing     Problem: Pain  Goal: Verbalizes/displays adequate comfort level or baseline comfort level  4/30/2022 0952 by Stan Lundberg LPN  Outcome: Progressing  4/29/2022 2223 by Douglas Oliva LPN  Outcome: Progressing

## 2022-04-30 NOTE — DISCHARGE SUMMARY
Discharge Summary    Name:  Brown Sierra /Age/Sex: 1965 (61 y.o. male)   Admit Date: 2022  Discharge Date: 22    MRN & CSN:  [id number] & [id number] Encounter Date and Time 22 10:39 AM EDT    Attending:  Dr. Radha Maloney Discharging Provider: Angelina Leblanc MD       Hospital Course:     Brief HPI and Hospital Course: Brown Sierra is a 61 y.o. male who presented to the ED with a cough for 2 days. He was producing some phlegm. He had associated chest tightness and abdominal pain with coughing. He had nasal congestion. He was noted to be hypoxic in the ED and was admitted. The day after admission respiratory molecular panel came back positive for influenza a (H3 subtype). He has underlying cerebral palsy and was weak and debilitated. \He was released to the acute rehab unit on the first floor in the hospital after a 4-day inpatient stay. Brief Problem Based Course:     Acute respiratory failure with hypoxia secondary to influenza A  -He remained on 2 L oxygen throughout his stay    Influenza A  -CTA chest: Done on  and showed elevation of the right hemidiaphragm with right lower lobe atelectasis and minimal left lower lobe atelectasis  -Antiviral antibiotics: Treated with oseltamivir  -Antibacterial antibiotics: Received 4 days of Zithromax 500 mg IV daily. Of note his procalcitonin level was only 0.07 when checked on , so no further antibiotics were prescribed upon discharge  -Bronchodilators: Pulmonary saw him and felt that he has reactive airway disease, and ordered DuoNebs every 4 hours, he was transferred on these. He did not receive any systemic steroids while here.     Apnea screen is positive that he has suspected sleep apnea: AHI was 23.2, and risk index was 25.4-recommend outpatient pulmonary follow-up for sleep study    CT scan showed evidence of possible colitis  -CT reading:  Colonic wall thickening is identified involving the proximal transverse colon, with a diffuse fluid-filled colon suggestive of acute colitis   -When I was seeing him he was not complaining of much abdominal pain at all. Recommend outpatient referral to gastroenterology. Cerebral palsy-discharged to the acute rehab unit    Seizure disorder-on Keppra    Mood-on paroxetine    Status post mechanical fall  -He had knee pain and x-ray showed no acute abnormality. Continue to follow clinical status. Elevated lipase level-level was 234 on arrival to the ED. Reference range 13 to 60. The pancreas was unremarkable on CT scan in the ED. there are some reports in the literature that influenza A can cause pancreatitis. If this was the case here, it was just a very mild case. Hepatic steatosis-seen on CT scan in the ED incidentally. LFTs are normal.  Outpatient follow-up recommended. History of Meckel's diverticulum-patient reports that he had surgery for this at age 21 months    Hypothyroidism-on levothyroxine    Enlarged prostate-incidentally seen on CT scan done in the ED. Recommend outpatient urology referral.    Diabetes mellitus type 2 is listed as a diagnosis  -A1c was 5.7% last year- the only value available in his chart  -He is not on medication. Maximum blood sugar while here was 117. Hypertension-he is on amlodipine as outpatient-continue  Overweight with BMI 25.9  Never smoker    Social history  -He lives at a group home and is relatively high functioning.  -He reports that he has 1 sister at home he has no contact with, and 1 at home he has occasional contact with    The patient expressed appropriate understanding of, and agreement with the discharge recommendations, medications, and plan.      Consults this admission:  IP CONSULT TO HOSPITALIST  IP CONSULT TO PULMONOLOGY    Discharge Diagnosis:   Influenza A    Discharge Instruction:   Follow up appointments: Recommend pulmonary follow-up  Primary care physician: Miko Nuñez MD within 2 weeks  Diet: Solids  Activity: activity as tolerated  Disposition: Discharged to:   Acute rehab unit  Condition on discharge: Stable  Labs and Tests to be Followed up as an outpatient by PCP or Specialist:   -Recommend outpatient sleep study for positive apnea screen  -Recommend outpatient gastroenterology referral for hepatic steatosis and colitis seen on CT scan  -Recommend outpatient urology referral for enlarged prostate seen on CT scan    Medications listed on the electronic health record at the time of admission:        Medication List      ASK your doctor about these medications    acetaminophen 325 MG tablet  Commonly known as: Aminofen  Take 2 tablets by mouth every 6 hours as needed for Pain     albuterol sulfate  (90 Base) MCG/ACT inhaler  Inhale 2 puffs into the lungs every 2 hours as needed for Wheezing or Shortness of Breath     amLODIPine 10 MG tablet  Commonly known as: NORVASC  Take 1 tablet by mouth every morning     levETIRAcetam 750 MG tablet  Commonly known as: KEPPRA  Ask about: Which instructions should I use?     levothyroxine 25 MCG tablet  Commonly known as: SYNTHROID     PARoxetine 40 MG tablet  Commonly known as: PAXIL  Take 1 tablet by mouth every morning           Objective Findings at Discharge:   BP (!) 119/95   Pulse 87   Temp 97.9 °F (36.6 °C) (Oral)   Resp 19   Ht 5' 8\" (1.727 m)   Wt 166 lb 3.6 oz (75.4 kg)   SpO2 95%   BMI 25.27 kg/m²       Physical Exam:   General: NAD        Labs and Imaging      CTA PULMONARY W CONTRAST [[id number]] Collected: 04/18/22 0202     Order Status: Completed Updated: 04/18/22 0737     Narrative:       EXAMINATION:   CTA OF THE CHEST, 4/18/2022 1:32 am     TECHNIQUE:   CTA of the chest was performed after the administration of intravenous   contrast.  Multiplanar reformatted images are provided for review. MIP   images are provided for review.  Dose modulation, iterative reconstruction,   and/or weight based adjustment of the mA/kV was utilized to reduce the   radiation dose to as low as reasonably achievable. COMPARISON:   None     HISTORY:   ORDERING SYSTEM PROVIDED HISTORY: Hypoxia cough   TECHNOLOGIST PROVIDED HISTORY:   Reason for Exam:  Hypoxia cough   Decision Support Exception - unselect if not a suspected or confirmed   emergency medical condition->Emergency Medical Condition (MA)   Reason for Exam:  Hypoxia cough     FINDINGS:   Pulmonary Arteries: Pulmonary arteries are adequately opacified for   evaluation. Evaluation of the smaller more peripheral pulmonary arteries are   limited due to motion artifact. No definite evidence of large or central   intraluminal filling defect to suggest pulmonary embolism. Main pulmonary   artery is normal in caliber. Mediastinum: No evidence of mediastinal lymphadenopathy. The heart and   pericardium demonstrate no acute abnormality. There is no acute abnormality   of the thoracic aorta. Lungs/pleura: Evaluation is limited by motion artifact. There is elevation   of the right hemidiaphragm with right lower lobe atelectasis. Minimal left   lower lobe atelectasis is noted. There is no evidence of pneumonia. There   is no pleural effusion or pneumothorax. Upper Abdomen: Diffuse fatty infiltration of the liver is noted. Soft Tissues/Bones: No acute bone or soft tissue abnormality. Impression:       1. Evaluation is limited due to motion artifact. No large or central   pulmonary emboli are identified. Smaller more peripheral pulmonary emboli   cannot be entirely excluded due to motion artifact. 2. Elevation of the right hemidiaphragm with right lower lobe atelectasis. Minimal left lower lobe atelectasis. 3. Diffuse fatty infiltration of the liver.       CT ABDOMEN PELVIS W IV CONTRAST Additional Contrast? None [[id number]] Collected: 04/18/22 0217     Order Status: Completed Updated: 04/18/22 0230     Narrative:       EXAMINATION:   CT OF THE ABDOMEN AND PELVIS WITH CONTRAST 4/18/2022 1:32 am     TECHNIQUE:   CT of the abdomen and pelvis was performed with the administration of   intravenous contrast. Multiplanar reformatted images are provided for review. Dose modulation, iterative reconstruction, and/or weight based adjustment of   the mA/kV was utilized to reduce the radiation dose to as low as reasonably   achievable. COMPARISON:   06/02/2021     HISTORY:   ORDERING SYSTEM PROVIDED HISTORY: abd pain elevated lipase   TECHNOLOGIST PROVIDED HISTORY:   Reason for exam:->abd pain elevated lipase   Additional Contrast?->None   Decision Support Exception - unselect if not a suspected or confirmed   emergency medical condition->Emergency Medical Condition (MA)   Reason for Exam: abd pain elevated lipase     FINDINGS:   Lower Chest: No significant pericardial effusion is identified. There is a   small hiatal hernia. For findings in the chest, please see the dedicated CT   scan of the chest report for more complete details. Organs: Diffuse hepatic steatosis is identified. No focal liver or splenic   mass is identified. Contracted gallbladder. Adrenal glands are   unremarkable. Pancreas is unremarkable. No peripancreatic inflammatory   process is identified. No enhancing renal mass is identified. No   hydroureteronephrosis is identified. No periureteral stranding is seen. GI/Bowel: No ileus or obstruction is identified. Liquid stool noted   throughout the colon. Mild wall thickening involving the proximal transverse   colon     Pelvis: Bladder appears unremarkable. Prostate gland is enlarged. No free   fluid is identified in the pelvis. No bulky pelvic lymphadenopathy is   identified. Peritoneum/Retroperitoneum: No abdominal aortic aneurysm is identified. No   aortic dissection. No retroperitoneal or mesenteric lymphadenopathy is   identified. There are tiny right paramidline omental fat containing   peritoneal defect seen on axial image 93 and 99.   No bowel herniation. Bones/Soft Tissues: No acute subcutaneous soft tissue abnormality is   identified. Levo scoliotic deformity is seen. Multilevel degenerative   changes are seen within the spine. There is a compression fracture at the   level of T12, unchanged from the previous examination. No new fracture is   identified. Impression:       1. Colonic wall thickening is identified involving the proximal transverse   colon, with a diffuse fluid-filled colon suggestive of acute colitis. No   ileus or obstruction. 2. Diffuse hepatic steatosis. 3. Tiny omental fat containing hernia seen in the right paramidline above the   umbilicus. No bowel herniation. 4. Mild prostate enlargement. 5. Atherosclerosis. 6. Small hiatal hernia. XR KNEE LEFT (MIN 4 VIEWS) [[id number]] Collected: 04/17/22 2323     Order Status: Completed Updated: 04/17/22 2327     Narrative:       EXAMINATION:   FOUR XRAY VIEWS OF THE LEFT KNEE     4/17/2022 11:00 pm     COMPARISON:   None. HISTORY:   ORDERING SYSTEM PROVIDED HISTORY: pain   TECHNOLOGIST PROVIDED HISTORY:   Reason for exam:->pain   Reason for Exam: pain     Left knee pain     FINDINGS:   No evidence of acute fracture or dislocation. No focal osseous lesion. No   evidence of joint effusion. No focal soft tissue abnormality. Impression:       No acute abnormality of the knee. XR CHEST PORTABLE [[id number]] Collected: 04/17/22 2322     Order Status: Completed Updated: 04/17/22 2326     Narrative:       EXAMINATION:   ONE XRAY VIEW OF THE CHEST     4/17/2022 11:00 pm     COMPARISON:   Chest radiograph dated June 6, 2021     HISTORY:   ORDERING SYSTEM PROVIDED HISTORY: cough chest pain   TECHNOLOGIST PROVIDED HISTORY:   Reason for exam:->cough chest pain   Reason for Exam: cough chest pain     FINDINGS:   There is chronic elevation of the right hemidiaphragm. Cardiomediastinal   silhouette is stable.   Bronchial wall thickening is seen which could be   related to acute bronchitis. There is no focal consolidation or large   pleural effusion. There is no definite pneumothorax. Impression:       Bronchial wall thickening is seen which could be related to acute bronchitis. No focal consolidation.           Time Spent Discharging patient 45 minutes    Electronically signed by Aly Almeida MD on 4/29/2022 at 9:29 PM

## 2022-04-30 NOTE — PROGRESS NOTES
Pulmonary and Critical Care  Progress Note    Subjective: The patient is better. Shortness of breath none. Chest pain none. Addressing respiratory complaints Patient is negative for  hemoptysis and cyanosis. CONSTITUTIONAL:  negative for fevers and chills. Past Medical History:     has a past medical history of Cerebral palsy ([address] Depression, Hypertension, and Seizures ([facility] [address] has a past surgical history that includes Eye surgery; Abdomen surgery; and Upper gastrointestinal endoscopy (N/A, 12/3/2019). reports that he has never smoked. He has never used smokeless tobacco. He reports that he does not drink alcohol and does not use drugs. Family history:  family history is not on file. Allergies   Allergen Reactions    Pcn [Penicillins]      Social History:    Reviewed; no changes    Objective:   PHYSICAL EXAM:        VITALS:  /82   Pulse 88   Temp 98.4 °F (36.9 °C) (Oral)   Resp 16   Ht 5' 7\" (1.702 m)   Wt 165 lb 12.6 oz (75.2 kg)   SpO2 90%   BMI 25.97 kg/m²     24HR INTAKE/OUTPUT:      Intake/Output Summary (Last 24 hours) at 4/30/2022 1227  Last data filed at 4/30/2022 1022  Gross per 24 hour   Intake 300 ml   Output 150 ml   Net 150 ml       CONSTITUTIONAL:  Awake. LUNGS:  decreased breath sounds, occ basilar crackles. CARDIOVASCULAR:  normal S1 and S2 and negative JVD  ABD:Abdomen soft, non-tender. BS normal. No masses,  No organomegaly. Marda Erichsen. DATA:    CBC:  Recent Labs     04/28/22  0559   WBC 7.7   RBC 4.82   HGB 15.1   HCT 44.3      MCV 91.9   MCH 31.3*   MCHC 34.1   RDW 11.7   SEGSPCT 44.3      BMP:  Recent Labs     04/28/22  0559      K 3.6      CO2 26   BUN 9   CREATININE 0.8*   CALCIUM 8.6   GLUCOSE 86      ABG:  No results for input(s): PH, PO2ART, AVE8OHC, HCO3, BEART, O2SAT in the last 72 hours.   Lab Results   Component Value Date    PROBNP 25.54 04/19/2022    PROBNP 26.35 04/17/2022    PROBNP 35.21 06/08/2021     No results found for: [facility]    Radiology Review:  Pertinent images / reports were reviewed as a part of this visit. Assessment:     Patient Active Problem List   Diagnosis    Pneumonia    Cerebral palsy, hemiplegic (HCC)    Hypoxia    Seizure disorder ([address]    Gait disturbance    Pneumonia of both lungs due to infectious organism    Acute upper GI bleed    Cecal polyp    Sebaceous cyst    Acute respiratory failure with hypoxemia (HCC)    Influenza A    Generalized weakness    Essential hypertension    Spastic quadriplegic cerebral palsy ([address]       Plan:   1. Overall the patient has improved. 2. Inc. Activity.   Rolanda Costa MD   4/30/2022  12:27 PM

## 2022-04-30 NOTE — PROGRESS NOTES
Lizzie Garza    : 1965  Acct #: [de-identified]  MRN: [id number]              PM&R Progress Note      Admitting diagnosis: Acute respiratory failure ([address])     Comorbid diagnoses impacting rehabilitation: Influenza A pneumonia, hypoxemia, generalized weakness, gait disturbance, seizure disorder, essential hypertension, cerebral palsy     Chief complaint: He denies specific complaints but does talk about wanting to go to his home. Prior (baseline) level of function: Independent. Current level of function:         Current  [facility] and Goals:   Occupational Therapy:    Short Term Goals  Time Frame for Short term goals: STGs=LTGs :   Long Term Goals  Time Frame for Long term goals : 7-10 days or until d/c. Long Term Goal 1: Pt will complete self-feeding c Ind. Long Term Goal 2: Pt will complete grooming tasks c Mod I.  Long Term Goal 3: Pt will complete total body bathing c Mod I.  Long Term Goal 4: Pt will complete UB dressing c Ind. Long Term Goal 5: Pt will complete LB dressing c Mod I. Additional Goals?: Yes  Long Term Goal 6: Pt will doff/don footwear c Mod I.  Long Term Goal 7: Pt will complete toileting c Mod I.  Long Term Goal 8: Pt will perform functional transfers (bed, chair, toilet, shower) c DME PRN and Mod I.  Long Term Goal 9: Pt will perform therex/therax to facilitate increased strength/endurance/ax tolerance (c emphasis on dynamic standing balance/tolerance > 8 mins) c Mod I.  Long Term Goal 10: Pt will complete home management tasks c Mod I. :                                       Eating: Eating  Assistance Needed: Independent  Comment: X  CARE Score: 6  Discharge Goal: Independent       Oral Hygiene: Oral Hygiene  Assistance Needed: Independent  Comment: X  CARE Score: 6  Discharge Goal: Independent    UB/LB Bathing: Shower/Bathe Self  Assistance Needed: Independent  Comment:  Mod I seated entirety of shower weight shifts to wash buttocks per patient baseline  CARE Score: 6  Discharge Goal: Independent    UB Dressing: Upper Body Dressing  Assistance Needed: Independent  Comment: X  CARE Score: 6  Discharge Goal: Independent         LB Dressing: Lower Body Dressing  Assistance Needed: Independent  Comment: X  CARE Score: 6  Discharge Goal: Independent    Donning and [place] Footwear: Putting On/Taking Off Footwear  Assistance Needed: Independent  Comment: X  CARE Score: 6  Discharge Goal: Independent      Toileting: Toileting Hygiene  Assistance needed: Independent  Comment: Mod I  CARE Score: 6  Discharge Goal: Independent      Toilet Transfers: Toilet Transfer  Assistance needed: Supervision or touching assistance  Comment: Sup  CARE Score: 4  Discharge Goal: Independent    Physical Therapy:         Long Term Goals  Time Frame for Long term goals : 10 tx days:  Long term goal 1: Pt will complete bed mobility (scooting, rolling R/L, and sup<->sit) Ind.  Long term goal 2: Pt will complete OOB transfers Mod Ind-Ind. Long term goal 3: Pt will ambulate 150 ft over level surface using LRAD prn Mod Ind-Ind. Long term goal 4: Pt will ambulate at least 10 ft of uneven surface and ascend/descend curb step using LRAD with SBA-Sup. Long term goal 5: Pt will ascend/descend 1 flight of stairs using railings with SBA. Long term goal 6: Pt will complete object retrieval from the floor using reacher Mod Ind.       Bed Mobility:   Sit to Lying  Assistance Needed: Independent  Comment: Practiced in regular flat bed without rails  CARE Score: 6  Discharge Goal: Independent  Roll Left and Right  Assistance Needed: Independent  Comment: Practiced in regular flat bed without rails  CARE Score: 6  Discharge Goal: Independent  Lying to Sitting on Side of Bed  Assistance Needed: Independent  Comment: Practiced in regular flat bed without rails  CARE Score: 6  Discharge Goal: Independent    Transfers:    Sit to Stand  Assistance Needed: Supervision or touching assistance  Comment: SBA using 2WW  CARE Score: 4  Discharge Goal: Independent  Chair/Bed-to-Chair Transfer  Assistance Needed: Supervision or touching assistance  Comment: SBA using 2WW  CARE Score: 4  Discharge Goal: Independent     Car Transfer  Assistance Needed: Supervision or touching assistance  Comment: SBA with RW. Pt requires assist for O2 line management. CARE Score: 4  Discharge Goal: Independent    Ambulation:    Walking Ability  Does the Patient Walk?: Yes     Walk 10 Feet  Assistance Needed: Supervision or touching assistance  Comment: SBA using 2WW  CARE Score: 4  Discharge Goal: Independent     Walk 50 Feet with Two Turns  Assistance Needed: Supervision or touching assistance  Comment: SBA using 2WW  Reason if not Attempted: Not attempted due to medical condition or safety concerns  CARE Score: 4  Discharge Goal: Independent     Walk 150 Feet  Assistance Needed: Supervision or touching assistance  Comment: SBA using 2WW  Reason if not Attempted: Not attempted due to medical condition or safety concerns  CARE Score: 4  Discharge Goal: Independent     Walking 10 Feet on Uneven Surfaces  Assistance Needed: Supervision or touching assistance  Comment: CGA with RW. Pt requires assist for O2 line management. CARE Score: 4  Discharge Goal: Supervision or touching assistance     1 Step (Curb)  Assistance Needed: Supervision or touching assistance  Comment: CGA with RW. Pt requires assist for O2 line management. (4\" step)  CARE Score: 4  Discharge Goal: Supervision or touching assistance     4 Steps  Assistance Needed: Supervision or touching assistance  Comment: SBA with 2 rails. Pt required assist for O2 line management. CARE Score: 4  Discharge Goal: Supervision or touching assistance     12 Steps  Assistance Needed: Supervision or touching assistance  Comment: SBA with 2 rails. Pt required assist for O2 line management.   Reason if not Attempted: Not attempted due to medical condition or safety concerns  CARE Score: 4  Discharge Goal: Supervision or touching assistance       Wheelchair:  w/c Ability: Wheelchair Ability  Uses a Wheelchair and/or Scooter?: No                Balance:        Object: Picking Up Object  Assistance Needed: Supervision or touching assistance  Comment: SBA with RW. Pt used reacher. CARE Score: 4  Discharge Goal: Independent    I      Exam:    Blood pressure (!) 145/91, pulse 86, temperature 98.2 °F (36.8 °C), temperature source Oral, resp. rate 18, height 5' 7\" (1.702 m), weight 165 lb 12.6 oz (75.2 kg), SpO2 93 %. General: Lying back in bed. Resting quietly. HEENT: Symmetric facial expression. Neck supple. No JVD. Pulmonary: Unlabored but shallow. Cardiac: RRR. Abdomen: Patient's abdomen is soft and nondistended. Bowel sounds were present throughout. There was no rebound, guarding or masses noted. Upper extremities: No new bruising or swelling. Lower extremities: No signs of DVT. Hypertonicity persists. Sitting balance was good. Standing balance was poor. Lab Results   Component Value Date    WBC 7.7 04/28/2022    HGB 15.1 04/28/2022    HCT 44.3 04/28/2022    MCV 91.9 04/28/2022     04/28/2022     Lab Results   Component Value Date    INR 1.13 12/02/2019    INR 1.08 11/30/2019    PROTIME 12.9 12/02/2019    PROTIME 12.3 11/30/2019     Lab Results   Component Value Date    CREATININE 0.8 (L) 04/28/2022    BUN 9 04/28/2022     04/28/2022    K 3.6 04/28/2022     04/28/2022    CO2 26 04/28/2022     Lab Results   Component Value Date    ALT 36 04/28/2022    AST 20 04/28/2022    ALKPHOS 90 04/28/2022    BILITOT 0.6 04/28/2022       Expected length of stay  prior to a supervised level of function for discharge home with a walker and [facility] OT/PT is 5/3/2022. Recommendations:    1.  Acute respiratory failure with gait dysfunction: Sometimes needs significant cueing to engage in the daily occupational and physical therapy.  Nonspecific complaints are sometimes used to try to get out of therapy. Stephane Long is benefiting from aggressive pulmonary hygiene measures, supplemental oxygen to keep saturations greater than 90%.  He has completed a full course of the Tamiflu. We have weaned him from the supplemental.   Some urinary urgency.  No bowel incontinence.  Ongoing duo nebs, cough medicine and adaptive equipment training.  Verbal cues and CGA for transfers today. 2. DVT prophylaxis: Lovenox 40 mg subcu daily.  I must monitor his hemoglobin and platelet count periodically while on this medication.  Weightbearing activities are slowly improving daily.  GI prophylaxis provided.  No new bruising or swelling.   3. Seizure disorder: Tolerating his home AED med of Keppra 750 mg twice daily.  Consistent alertness seems to indicate he is adjusting to these meds. He is sleeping better.  No clinical evidence of active seizures. 4. Hypertension: Norvasc to manage his blood pressure.  Target systolic blood pressure is 120-140.  Vital signs are checked at rest and with activity.  Blood pressure is essentially within target range again today.   5. Depression with anxiety: 40 mg daily.  More consistent sleep noted.  Involvement of his group home staff when possible.  Receiving encouragement from our staff.

## 2022-05-01 PROCEDURE — 6360000002 HC RX W HCPCS: Performed by: INTERNAL MEDICINE

## 2022-05-01 PROCEDURE — 6370000000 HC RX 637 (ALT 250 FOR IP): Performed by: PHYSICAL MEDICINE & REHABILITATION

## 2022-05-01 PROCEDURE — 94150 VITAL CAPACITY TEST: CPT

## 2022-05-01 PROCEDURE — 1280000000 HC REHAB R&B

## 2022-05-01 PROCEDURE — 6370000000 HC RX 637 (ALT 250 FOR IP): Performed by: INTERNAL MEDICINE

## 2022-05-01 PROCEDURE — 94761 N-INVAS EAR/PLS OXIMETRY MLT: CPT

## 2022-05-01 RX ADMIN — GUAIFENESIN 600 MG: 600 TABLET, EXTENDED RELEASE ORAL at 09:20

## 2022-05-01 RX ADMIN — PAROXETINE HYDROCHLORIDE 40 MG: 20 TABLET, FILM COATED ORAL at 09:19

## 2022-05-01 RX ADMIN — LEVETIRACETAM 750 MG: 500 TABLET, FILM COATED ORAL at 20:22

## 2022-05-01 RX ADMIN — LEVETIRACETAM 750 MG: 500 TABLET, FILM COATED ORAL at 09:20

## 2022-05-01 RX ADMIN — BENZONATATE 100 MG: 100 CAPSULE ORAL at 09:20

## 2022-05-01 RX ADMIN — ENOXAPARIN SODIUM 40 MG: 100 INJECTION SUBCUTANEOUS at 09:20

## 2022-05-01 RX ADMIN — LEVOTHYROXINE SODIUM 25 MCG: 0.05 TABLET ORAL at 06:09

## 2022-05-01 RX ADMIN — BENZONATATE 100 MG: 100 CAPSULE ORAL at 14:51

## 2022-05-01 RX ADMIN — BENZONATATE 100 MG: 100 CAPSULE ORAL at 20:23

## 2022-05-01 RX ADMIN — AMLODIPINE BESYLATE 10 MG: 10 TABLET ORAL at 09:20

## 2022-05-01 RX ADMIN — GUAIFENESIN 600 MG: 600 TABLET, EXTENDED RELEASE ORAL at 20:23

## 2022-05-01 ASSESSMENT — PAIN SCALES - GENERAL: PAINLEVEL_OUTOF10: 0

## 2022-05-01 NOTE — PLAN OF CARE
Problem: Discharge Planning  Goal: Discharge to home or other facility with appropriate resources  5/1/2022 1020 by Gita Montalvo LPN  Outcome: Progressing  4/30/2022 2146 by Nicole Swan RN  Outcome: Progressing     Problem: Safety - Adult  Goal: Free from fall injury  5/1/2022 1020 by Gita Montalvo LPN  Outcome: Progressing  4/30/2022 2146 by Nicole Swan RN  Outcome: Progressing     Problem: ABCDS Injury Assessment  Goal: Absence of physical injury  5/1/2022 1020 by Gita Montalvo LPN  Outcome: Progressing  4/30/2022 2146 by Nicole Swan RN  Outcome: Progressing     Problem: Skin/Tissue Integrity  Goal: Absence of new skin breakdown  Description: 1. Monitor for areas of redness and/or skin breakdown  2. Assess vascular access sites hourly  3. Every 4-6 hours minimum:  Change oxygen saturation probe site  4. Every 4-6 hours:  If on nasal continuous positive airway pressure, respiratory therapy assess nares and determine need for appliance change or resting period.   5/1/2022 1020 by Gita Montalvo LPN  Outcome: Progressing  4/30/2022 2146 by Nicole Swan RN  Outcome: Progressing     Problem: Nutrition Deficit:  Goal: Optimize nutritional status  5/1/2022 1020 by Gita Montalvo LPN  Outcome: Progressing  4/30/2022 2146 by Nicole Swan RN  Outcome: Progressing     Problem: Pain  Goal: Verbalizes/displays adequate comfort level or baseline comfort level  5/1/2022 1020 by Gita Montalvo LPN  Outcome: Progressing  4/30/2022 2146 by Nicole Swan RN  Outcome: Progressing

## 2022-05-01 NOTE — PROGRESS NOTES
Pulmonary and Critical Care  Progress Note    Subjective: The patient is doing well. On RA. Shortness of breath none. Chest pain none. Addressing respiratory complaints Patient is negative for  hemoptysis and cyanosis. CONSTITUTIONAL:  negative for fevers and chills. Past Medical History:     has a past medical history of Cerebral palsy ([facility] [address] Depression, Hypertension, and Seizures ([facility] [address] has a past surgical history that includes Eye surgery; Abdomen surgery; and Upper gastrointestinal endoscopy (N/A, 12/03/2019). reports that he has never smoked. He has never used smokeless tobacco. He reports that he does not drink alcohol and does not use drugs. Family history:  family history is not on file. Allergies   Allergen Reactions    Pcn [Penicillins]      Social History:    Reviewed; no changes    Objective:   PHYSICAL EXAM:        VITALS:  BP (!) 129/98   Pulse 87   Temp 97.5 °F (36.4 °C) (Oral)   Resp 16   Ht 5' 7\" (1.702 m)   Wt 167 lb 15.9 oz (76.2 kg)   SpO2 92%   BMI 26.31 kg/m²     24HR INTAKE/OUTPUT:      Intake/Output Summary (Last 24 hours) at 5/1/2022 1122  Last data filed at 5/1/2022 0612  Gross per 24 hour   Intake 160 ml   Output 652 ml   Net -492 ml       CONSTITUTIONAL:  Awake. LUNGS:  decreased breath sounds. CARDIOVASCULAR:  normal S1 and S2 and negative JVD  ABD:Abdomen soft, non-tender. BS normal. No masses,  No organomegaly. Terressa Hussar. DATA:    CBC:  No results for input(s): WBC, RBC, HGB, HCT, PLT, MCV, MCH, MCHC, RDW, NRBC, SEGSPCT, BANDSPCT in the last 72 hours. BMP:  No results for input(s): NA, K, CL, CO2, BUN, CREATININE, CALCIUM, GLUCOSE in the last 72 hours. ABG:  No results for input(s): PH, PO2ART, CXX0PGV, HCO3, BEART, O2SAT in the last 72 hours.   Lab Results   Component Value Date    PROBNP 25.54 04/19/2022    PROBNP 26.35 04/17/2022    PROBNP 35.21 06/08/2021     No results found for: [facility]    Radiology Review:  Pertinent images / reports were reviewed as a part of this visit. Assessment:     Patient Active Problem List   Diagnosis    Pneumonia    Cerebral palsy, hemiplegic (HCC)    Hypoxia    Seizure disorder ([address]    Gait disturbance    Pneumonia of both lungs due to infectious organism    Acute upper GI bleed    Cecal polyp    Sebaceous cyst    Acute respiratory failure with hypoxemia (HCC)    Influenza A    Generalized weakness    Essential hypertension    Spastic quadriplegic cerebral palsy ([address]       Plan:   1. Overall the patient has improved. 2. Inc. Activity.   Trang Jacques MD   5/1/2022  11:22 AM

## 2022-05-02 PROCEDURE — 97530 THERAPEUTIC ACTIVITIES: CPT

## 2022-05-02 PROCEDURE — 97535 SELF CARE MNGMENT TRAINING: CPT

## 2022-05-02 PROCEDURE — 6370000000 HC RX 637 (ALT 250 FOR IP): Performed by: PHYSICAL MEDICINE & REHABILITATION

## 2022-05-02 PROCEDURE — 94761 N-INVAS EAR/PLS OXIMETRY MLT: CPT

## 2022-05-02 PROCEDURE — 6370000000 HC RX 637 (ALT 250 FOR IP): Performed by: INTERNAL MEDICINE

## 2022-05-02 PROCEDURE — 97116 GAIT TRAINING THERAPY: CPT

## 2022-05-02 PROCEDURE — 99232 SBSQ HOSP IP/OBS MODERATE 35: CPT | Performed by: PHYSICAL MEDICINE & REHABILITATION

## 2022-05-02 PROCEDURE — 99232 SBSQ HOSP IP/OBS MODERATE 35: CPT | Performed by: INTERNAL MEDICINE

## 2022-05-02 PROCEDURE — 1280000000 HC REHAB R&B

## 2022-05-02 PROCEDURE — 6360000002 HC RX W HCPCS: Performed by: INTERNAL MEDICINE

## 2022-05-02 PROCEDURE — 97110 THERAPEUTIC EXERCISES: CPT

## 2022-05-02 RX ORDER — GUAIFENESIN 600 MG/1
600 TABLET, EXTENDED RELEASE ORAL 2 TIMES DAILY
COMMUNITY
Start: 2022-05-02

## 2022-05-02 RX ORDER — BENZONATATE 100 MG/1
100 CAPSULE ORAL 3 TIMES DAILY
Qty: 30 CAPSULE | Refills: 0 | Status: SHIPPED | OUTPATIENT
Start: 2022-05-02 | End: 2022-05-12

## 2022-05-02 RX ADMIN — BENZONATATE 100 MG: 100 CAPSULE ORAL at 08:52

## 2022-05-02 RX ADMIN — PAROXETINE HYDROCHLORIDE 40 MG: 20 TABLET, FILM COATED ORAL at 08:51

## 2022-05-02 RX ADMIN — BENZONATATE 100 MG: 100 CAPSULE ORAL at 13:43

## 2022-05-02 RX ADMIN — BENZONATATE 100 MG: 100 CAPSULE ORAL at 21:07

## 2022-05-02 RX ADMIN — ENOXAPARIN SODIUM 40 MG: 100 INJECTION SUBCUTANEOUS at 08:52

## 2022-05-02 RX ADMIN — GUAIFENESIN 600 MG: 600 TABLET, EXTENDED RELEASE ORAL at 08:51

## 2022-05-02 RX ADMIN — LEVOTHYROXINE SODIUM 25 MCG: 0.05 TABLET ORAL at 06:06

## 2022-05-02 RX ADMIN — LEVETIRACETAM 750 MG: 500 TABLET, FILM COATED ORAL at 21:07

## 2022-05-02 RX ADMIN — GUAIFENESIN 600 MG: 600 TABLET, EXTENDED RELEASE ORAL at 21:07

## 2022-05-02 RX ADMIN — LEVETIRACETAM 750 MG: 500 TABLET, FILM COATED ORAL at 08:51

## 2022-05-02 RX ADMIN — AMLODIPINE BESYLATE 10 MG: 10 TABLET ORAL at 08:51

## 2022-05-02 ASSESSMENT — PAIN SCALES - GENERAL: PAINLEVEL_OUTOF10: 0

## 2022-05-02 NOTE — PROGRESS NOTES
Pulmonary and Critical Care  Progress Note      VITALS:  BP (!) 129/90   Pulse 85   Temp 98.8 °F (37.1 °C) (Oral)   Resp 16   Ht 5' 7\" (1.702 m)   Wt 160 lb 7.9 oz (72.8 kg)   SpO2 92%   BMI 25.14 kg/m²     Subjective:   Chief complaint: Acute respiratory failure, bibasilar pneumonia, reactive airway disease,  No resp distress. States that his shortness of breath has improved  Using incentive spirometry  Patient awake and alert    Objective:   PHYSICAL EXAM:    LUNGS: Breath sounds are fairly clear bilaterally without wheezing or rhonchi  No new lab  O2 saturation 92% on room air    DATA:    CBC:  No results for input(s): WBC, RBC, HGB, HCT, PLT, MCV, MCH, MCHC, RDW, NRBC, SEGSPCT, BANDSPCT in the last 72 hours. BMP:  No results for input(s): NA, K, CL, CO2, BUN, CREATININE, CALCIUM, GLUCOSE in the last 72 hours. ABG:  No results for input(s): PH, PO2ART, HGL9IWF, HCO3, BEART, O2SAT in the last 72 hours. BNP  No results found for: BNP   D-Dimer:  Lab Results   Component Value Date    DDIMER 1088 (H) 04/17/2022      1. Radiology: No recent chest x-ray since 4/17/2022  Bronchial wall thickening is seen which could be related to acute bronchitis. No focal consolidation. CTA chest 4/18/2022  1. Evaluation is limited due to motion artifact.  No large or central   pulmonary emboli are identified.  Smaller more peripheral pulmonary emboli   cannot be entirely excluded due to motion artifact. 2. Elevation of the right hemidiaphragm with right lower lobe atelectasis. Minimal left lower lobe atelectasis.      Assessment:     Patient Active Problem List   Diagnosis    Pneumonia    Cerebral palsy, hemiplegic (HCC)    Hypoxia    Seizure disorder ([facility] [address]    Gait disturbance    Pneumonia of both lungs due to infectious organism    Acute upper GI bleed    Cecal polyp    Sebaceous cyst    Acute respiratory failure with hypoxemia (HCC)    Influenza A    Generalized weakness    Essential hypertension  Spastic quadriplegic cerebral palsy ([facility] [address]       Plan:   1. continue present treatment  2.  Encouraged to continue using incentive spirometry    Mary Bray MD MD  5/2/2022  2:43 PM

## 2022-05-02 NOTE — FLOWSHEET NOTE
[x] daily progress note       [] discharge       Patient Name:  Isabel Olmedo   :  1965 MRN: [id number]  Room:  [address] [place], [place] [address] Date of Admission: 2022  Rehabilitation Diagnosis:   Acute respiratory failure with hypoxia [J96.01]  Acute respiratory failure with hypoxemia ([facility] [address] [J96.01]       Date 2022       Day of ARU Week:  5   Time IN/OUT 7223-0820  2348-8624   Individual Tx Minutes 120   TOTAL Tx Time Mins 120   Restrictions Restrictions/Precautions  Restrictions/Precautions: Fall Risk,Seizure,General Precautions (Droplet precautions; R hand contracture, CP)  Position Activity Restriction  Other position/activity restrictions: monitor O2   Communication with other providers: [x]   OK to see per nursing:     [x]   Spoke case management James Smith) that pt requires supervision at this time during gait training d/t balance issues. Subjective observations and cognitive status: AM session: pt seen sitting up in recliner at beginning of treatment. Agreeable to therapy. PM session: pt seen sitting up on toilet at beginning of treatment. Agreeable to therapy.     Pain level/location: 0/10       Discharge recommendations   Anticipated discharge date: Expected Discharge Date: 22    Destination: []??????home alone   []??????home alone with assist PRN     []?????? home w/ family      []?????? Continuous supervision  []??????SNF    [x]?????? Assisted living     []?????? Other:   Continued therapy: [x]??????[facility] PT  []??????OUTPATIENT  PT   []?????? No Further PT  Equipment needs:Daniel Swanson requires the assistance of a wheeled walker to successfully ambulate from room to room at home to allow completion of daily living tasks such as: bathing, toileting, dressing and grooming.  A wheeled walker is necessary due to the patient's unsteady gait, upper body weakness, inability to  a standard walker.  This patient can ambulate only by pushing a walker instead of using a lesser assistive device such as a cane or crutch.      Bed Mobility:           [x]   Pt received out of bed   Rolling R/L:  Independent   Scooting:  Independent   Lying --> Sit:  Independent   Sit --> lying:  Independent  Pt practiced in regular, flat bed without rails. Transfers:    Sit--> Stand: mod I   Stand --> Sit:   Mod I   Chair-->Bed/Bed --> Chair:  Mod I   Car Transfers: mod I  Toilet Transfer (if applicable): Other:    Assistive device required for transfer:  RW    Gait:    Distance: 10': supervision   Distance: 48' with two turns: supervision  Distance: 150': supervision   Other Distances: AM session: 225'+899'+806'; PM session: 656'  Device:  RW  Gait Quality:  Step-to pattern; pt required vcs to slow down guera. Pt had instances of tipping left side of walker to the side d/t brief tone increases in right UE causing patient to not place even pressure through bilateral handles of walker. Pt required vcs to stop for safety when his walker would begin to tip to the side. Stairs (AM session)   # Completed: 1 step (curb): SBA with RW (pt required vcs for walker safety)  # Completed: 4 steps: SBA with 2 rails   # Completed: 12 steps: SBA with 2 rails     Uneven Surfaces: (AM session)      Assistance: SBA  Device: RW  Surfaces Completed:   [x]  Carpeted Surface with bean bags beneath      []  Throw rugs       []  Ramp       []  Outdoor pavements        []  Grass             []  Loose gravel        []  Other:     Pt required SBA for balance. Additional Therapeutic activities/exercises completed this date:     [x]   Nu-step: PM session: Time: 15 minutes       Level:  2     (for strengthening and endurance)    []   Rebounder:    []  Seated     []  Standing        [x]   Balance training: PM session: pt stood at toilet and cleaned buttocks with supervision for balance with RW. pt stood at toilet and performed pull-up of briefs and pants supervision for balance with RW.  Pt stood at sink and performed washing and drying of hands supervision for balance with RW. In // bars, pt stood on 2 blue stability trainers (60 seconds) with SBA for balance without UE support. Pt also stood on 1 blue stability  with narrow GILBERTO to challenge balance (60 seconds) with SBA for balance without UE support. []   Postural training    []   Supine ther ex (reps/sets):     []   Seated ther ex (reps/sets):     [x]   Standing ther ex (reps/sets): PM session: marching, hip extension, hip abduction, heel raises, toe raises, knee flexion, mini-squats x 10 reps each for strengthening. Pt used countertop for stability with BUEs. [x]   Picking up object from floor (standing): Supervision with RW                  [x]   Reacher used   []   Other:   []   Other:    Patient/Caregiver Education and Training:   [x]   Bed Mobility/Transfer technique/safety  [x]   Gait technique/sequencing  [x]   Proper use of assistive device  [x]   Advanced mobility safety and technique  []   Reinforced patient's precautions/mobility while maintaining precautions  []   Postural awareness  []   Family training    Treatment Plan for Next Session: pt to discharge tomorrow (5-3-2022) from ARU. Assessment: This pt demonstrated a positive response to today's treatment as evidenced by improved mobility. The patient is making progress toward established goals as evidenced by QI scores. Ongoing deficits are observed in the areas of strength, balance, endurance, and safety and continued focus on this is recommended.      Treatment/Activity Tolerance:   [x] Tolerated treatment with no adverse effects    [] Patient limited by fatigue  [] Patient limited by pain   [] Patient limited by medical complications:    [] Adverse reaction to Tx:   [] Significant change in status    Safety:       [x]  bed alarm set    []  chair alarm set    []  Pt refused alarms                []  Telesitter activated      [x]  Gait belt used during tx session      [x]other: After AM session: pt left sitting up in recliner with call light at end of treatment. After PM session: pt left sitting up in recliner with call light at end of treatment. Number of Minutes/Billable Intervention  Gait Training 75   Therapeutic Exercise 30   Neuro Re-Ed    Therapeutic Activity 15   Wheelchair Propulsion    Group    Other:    TOTAL 120         Social History  Social/Functional History  Lives With: Other (comment) (Pt lives in a group home.)  Type of Home: Assisted living  Home Layout: One level  Home Access: Level entry  Bathroom Shower/Tub: Walk-in shower,Shower chair with back  Bathroom Toilet: Standard  Bathroom Equipment: Shower chair,Grab bars in shower,Grab bars around [facility]  Bathroom Accessibility: Accessible  Home Equipment:  (No AD)  Has the patient had two or more falls in the past year or any fall with injury in the past year?: Yes (Pt reports 4-5 falls in the last year c no significant injuries.)  Receives Help From: Personal care attendant (Group home staff cook meals and completes majority of the housework.)  ADL Assistance: [address] [place] [address] Avenue: Independent  Homemaking Responsibilities: Yes  Laundry Responsibility: Primary  Cleaning Responsibility: Secondary (Pt reports he cleans his own dishes.)  Ambulation Assistance: Independent  Transfer Assistance: Independent  Active : No  Patient's  Info:  genet Oconnell of Transportation: Galina Maria  Occupation: Part time employment  Type of Occupation: [employer], 4 days a week [address]: Pt enjoys watching TV/sports and playing cards. Additional Comments: Pt reports he has a flat bed at home. Objective                                                                                    Goals:  (Update in navigator)   :   Long Term Goals  Time Frame for Long term goals : 10 tx days:  Long term goal 1: Pt will complete bed mobility (scooting, rolling R/L, and sup<->sit) Ind.  Long term goal 2: Pt will complete OOB transfers Mod Ind-Ind. Long term goal 3: Pt will ambulate 150 ft over level surface using LRAD prn Mod Ind-Ind. Long term goal 4: Pt will ambulate at least 10 ft of uneven surface and ascend/descend curb step using LRAD with SBA-Sup. Long term goal 5: Pt will ascend/descend 1 flight of stairs using railings with SBA. Long term goal 6: Pt will complete object retrieval from the floor using reacher Mod Ind.:        Plan of Care                                                                              Times per week: 5 days per week for a minimum of 60 minutes/day plus group as appropriate for 60 minutes.   Treatment to include Current Treatment Recommendations: Strengthening,Endurance training,Safety education & training,Balance training,Functional mobility training,Transfer training,Gait training,Stair training,Return to work related activity,Home exercise program,Patient/Caregiver education & training,Equipment evaluation, education, & procurement,Therapeutic activities    Electronically signed by   Mily Montero, [id number]  5/2/2022, 10:04 AM

## 2022-05-02 NOTE — PROGRESS NOTES
Physical Rehabilitation: OCCUPATIONAL THERAPY     [x] daily progress note       [x] discharge       Patient Name:  Jovan Dacosta   :  1965 MRN: [id number]  Room:  [address] [place] [address] Date of Admission: 2022  Rehabilitation Diagnosis:   Acute respiratory failure with hypoxia [J96.01]  Acute respiratory failure with hypoxemia (HCC) [J96.01]       Date 2022       Day of ARU Week:  5   Time IN//915   Individual Tx Minutes 60   Group Tx Minutes    Co-Treat Minutes    Concurrent Tx Minutes    TOTAL Tx Time Mins 60   Variance Time    Variance Time []   Refusal due to:     []   Medical hold/reason:    []   Illness   []   Off Unit for test/procedure  []   Extra time needed to complete task  []   Therapeutic need  []   Other (specify):   Restrictions Restrictions/Precautions: Fall Risk,Seizure,General Precautions (Droplet precautions; R hand contracture, CP)         Communication with other providers: [x]   OK to see per nursing:     []   Spoke with team member regarding:      Subjective observations and cognitive status: Patient up at EOB, pleasant and agreeable to therapy   Pain level/location: Per patient no pain noted    Discharge recommendations  Anticipated discharge date: 5/3  Destination: []home alone   []home alone w assist prn   [] home w/ family    [] Continuous supervision       []SNF    [] Assisted living     [x] Other:[facility]    Continued therapy: [x]HHC OT  []OUTPATIENT  OT   [] No Further OT  Equipment needs: None        ADLs:    Eating: Eating  Assistance Needed: Independent  Comment: X  CARE Score: 6  Discharge Goal: Independent       Oral Hygiene: Oral Hygiene  Assistance Needed: Independent  Comment: X  CARE Score: 6  Discharge Goal: Independent    UB/LB Bathing: Shower/Bathe Self  Assistance Needed: Independent  Comment: X  CARE Score: 6  Discharge Goal: Independent    UB Dressing: Upper Body Dressing  Assistance Needed: Independent  Comment: X  CARE Score: 6  Discharge Goal: Independent         LB Dressing: Lower Body Dressing  Assistance Needed: Independent  Comment: X  CARE Score: 6  Discharge Goal: Independent    Donning and [place] Footwear: Putting On/Taking Off Footwear  Assistance Needed: Independent  Comment: X  CARE Score: 6  Discharge Goal: Independent      Toileting: Toileting Hygiene  Assistance needed: Independent  Comment: Mod I  CARE Score: 6  Discharge Goal: Independent      Toilet Transfers: Toilet Transfer  Assistance needed: Independent  Comment: Mod I  CARE Score: 6  Discharge Goal: Independent  Device Used:    [x]   Standard Toilet         [x]   Grab Bars           []  Bedside Commode       []   Elevated Toilet          []   Other:        Bed Mobility:           [x]   Pt received out of bed   Rolling R/L:   Scooting:    Supine --> Sit:    Sit --> Supine:      Transfers:    Sit--> Stand: Mod I   Stand --> Sit:   Mod I   Stand-Pivot: Mod I   Other:    Assistive device required for transfer:   RW      Functional Mobility:  Throughout room/bathroom   Assistance:   Mod I   Device:   [x]   Rolling Walker     []   Standard Walker []   Wheelchair        []   [employer]       []   4-Wheeled Bronwyn Zara         []   Cardiac Bronwyn Zara       []   Other:        Homemaking Tasks:   Patient retrieves clothing and transports at [facility] level to bathroom       Additional Therapeutic activities/exercises completed this date:     [x]   ADL Training   [x]   Balance/Postural training     [x]   Bed/Transfer Training   []   Endurance Training   []   Neuromuscular Re-ed   []   Nu-step:  Time:        Level:         #Steps:       []   Rebounder:    []  Seated     []  Standing        []   Supine Ther Ex (reps/sets):     []   Seated Ther Ex (reps/sets):     []   Standing Ther Ex (reps/sets):     []   Other:      Comments:      Patient/Caregiver Education and Training:   [x]   [employer] Equipment Use  [x]   Bed Mobility/Transfer Technique/Safety  [x]   Energy Conservation Tips  []   Family training  [x] Postural Awareness  [x]   Safety During Functional Activities  []   Reinforced Patient's Precautions   []   Progress was updated and reviewed in Rehabtracker with patient and/or family this         date.     Treatment Plan for Next Session: POC to continue as tolerated         Treatment/Activity Tolerance:   [x] Tolerated treatment with no adverse effects    [] Patient limited by fatigue  [] Patient limited by pain   [] Patient limited by medical complications:    [] Adverse reaction to Tx:   [] Significant change in status    Safety:       []  bed alarm set    [x]  chair alarm set    []  Pt refused alarms                []  Telesitter activated      [x]  Gait belt used during tx session      []other:       Number of Minutes/Billable Intervention  Therapeutic Exercise    ADL Self-care 45   Neuro Re-Ed    Therapeutic Activity 15   Group    Other:    TOTAL 60       Social History  Social/Functional History  Lives With: Other (comment) (Pt lives in a group home.)  Type of Home: Assisted living  Home Layout: One level  Home Access: Level entry  Bathroom Shower/Tub: Walk-in shower,Shower chair with back  Bathroom Toilet: Standard  Bathroom Equipment: Shower chair,Grab bars in shower,Grab bars around [facility] [employer]  Bathroom Accessibility: Accessible  Home Equipment:  (No AD)  Has the patient had two or more falls in the past year or any fall with injury in the past year?: Yes (Pt reports 4-5 falls in the last year c no significant injuries.)  Receives Help From: Personal care attendant (Group home staff cook meals and completes majority of the housework.)  ADL Assistance: [address] [place] [address] Avenue: Independent  Homemaking Responsibilities: Yes  Laundry Responsibility: Primary  Cleaning Responsibility: Secondary (Pt reports he cleans his own dishes.)  Ambulation Assistance: Independent  Transfer Assistance: Independent  Active : No  Patient's  Info: DD services van  Mode of Transportation: Brandy Venegas  Occupation: Part time employment  Type of Occupation: [employer] - , 4 days a week 3hrs  Leisure & Hobbies: Pt enjoys watching TV/sports and playing cards. Additional Comments: Pt reports he has a flat bed at home. Objective                                                                                    Goals:  (Update in navigator)  Short Term Goals  Time Frame for Short term goals: STGs=LTGs:  Long Term Goals  Time Frame for Long term goals : 7-10 days or until d/c. Long Term Goal 1: Pt will complete self-feeding c Ind. Long Term Goal 2: Pt will complete grooming tasks c Mod I.  Long Term Goal 3: Pt will complete total body bathing c Mod I.  Long Term Goal 4: Pt will complete UB dressing c Ind. Long Term Goal 5: Pt will complete LB dressing c Mod I. Additional Goals?: Yes  Long Term Goal 6: Pt will doff/don footwear c Mod I.  Long Term Goal 7: Pt will complete toileting c Mod I.  Long Term Goal 8: Pt will perform functional transfers (bed, chair, toilet, shower) c DME PRN and Mod I.  Long Term Goal 9: Pt will perform therex/therax to facilitate increased strength/endurance/ax tolerance (c emphasis on dynamic standing balance/tolerance > 8 mins) c Mod I.  Long Term Goal 10: Pt will complete home management tasks c Mod I.:        Plan of Care                                                                              Times per week: 5 days per week for a minimum of 60 minutes/day plus group as appropriate for 60 minutes.   Treatment to include Plan  Times per Day: Daily  Current Treatment Recommendations: Strengthening,Balance training,Functional mobility training,Endurance training,Neuromuscular re-education,Safety education & training,Patient/Caregiver education & training,Equipment evaluation, education, & procurement,Self-Care / ADL,Home management training    Electronically signed by   ITALO Luis,  5/2/2022, 8:52 AM

## 2022-05-02 NOTE — CARE COORDINATION
Case mgt met with patient in room. Patient looking forward to dc home tomorrow. Reviewed referral to [facility], patient's previous provider and RW from [facility]. Case mgt left VM for patient's  [place]. Confirmed dc tomorrow with her providing transport before lunch. Dr Mario Torres RN mgr agreeable to morning discharge. DME orders faxed to [facility] at [phone] [facility].

## 2022-05-02 NOTE — PLAN OF CARE
Problem: Discharge Planning  Goal: Discharge to home or other facility with appropriate resources  5/2/2022 0928 by Jacquie Wells RN  Outcome: Progressing  5/1/2022 2135 by Lauren Avelar RN  Outcome: Progressing     Problem: Safety - Adult  Goal: Free from fall injury  5/2/2022 0928 by Jacquie Wells RN  Outcome: Progressing  5/1/2022 2135 by Lauren Avelar RN  Outcome: Progressing     Problem: ABCDS Injury Assessment  Goal: Absence of physical injury  5/2/2022 0928 by Jacquie Wells RN  Outcome: Progressing  5/1/2022 2135 by Lauren Avelar RN  Outcome: Progressing     Problem: Skin/Tissue Integrity  Goal: Absence of new skin breakdown  Description: 1. Monitor for areas of redness and/or skin breakdown  2. Assess vascular access sites hourly  3. Every 4-6 hours minimum:  Change oxygen saturation probe site  4. Every 4-6 hours:  If on nasal continuous positive airway pressure, respiratory therapy assess nares and determine need for appliance change or resting period.   5/2/2022 0928 by Jacquie Wells RN  Outcome: Progressing  5/1/2022 2135 by Lauren Avelar RN  Outcome: Progressing     Problem: Nutrition Deficit:  Goal: Optimize nutritional status  5/2/2022 0928 by Jacquie Wells RN  Outcome: Progressing  5/1/2022 2135 by Lauren Avelar RN  Outcome: Progressing     Problem: Pain  Goal: Verbalizes/displays adequate comfort level or baseline comfort level  5/2/2022 0928 by Jacquie Wells RN  Outcome: Progressing  5/1/2022 2135 by Lauren Avelar RN  Outcome: Progressing

## 2022-05-03 VITALS
SYSTOLIC BLOOD PRESSURE: 127 MMHG | RESPIRATION RATE: 18 BRPM | BODY MASS INDEX: 24.91 KG/M2 | OXYGEN SATURATION: 93 % | HEART RATE: 84 BPM | WEIGHT: 158.73 LBS | TEMPERATURE: 98.1 F | HEIGHT: 67 IN | DIASTOLIC BLOOD PRESSURE: 83 MMHG

## 2022-05-03 PROCEDURE — 6370000000 HC RX 637 (ALT 250 FOR IP): Performed by: INTERNAL MEDICINE

## 2022-05-03 PROCEDURE — 94150 VITAL CAPACITY TEST: CPT

## 2022-05-03 PROCEDURE — 94761 N-INVAS EAR/PLS OXIMETRY MLT: CPT

## 2022-05-03 PROCEDURE — 99239 HOSP IP/OBS DSCHRG MGMT >30: CPT | Performed by: PHYSICAL MEDICINE & REHABILITATION

## 2022-05-03 PROCEDURE — 6370000000 HC RX 637 (ALT 250 FOR IP): Performed by: PHYSICAL MEDICINE & REHABILITATION

## 2022-05-03 RX ADMIN — LEVETIRACETAM 750 MG: 500 TABLET, FILM COATED ORAL at 09:37

## 2022-05-03 RX ADMIN — AMLODIPINE BESYLATE 10 MG: 10 TABLET ORAL at 09:37

## 2022-05-03 RX ADMIN — BENZONATATE 100 MG: 100 CAPSULE ORAL at 09:37

## 2022-05-03 RX ADMIN — PAROXETINE HYDROCHLORIDE 40 MG: 20 TABLET, FILM COATED ORAL at 09:37

## 2022-05-03 RX ADMIN — LEVOTHYROXINE SODIUM 25 MCG: 0.05 TABLET ORAL at 06:16

## 2022-05-03 RX ADMIN — GUAIFENESIN 600 MG: 600 TABLET, EXTENDED RELEASE ORAL at 09:37

## 2022-05-03 NOTE — PLAN OF CARE
Problem: Discharge Planning  Goal: Discharge to home or other facility with appropriate resources  5/3/2022 0726 by Anil Rutherford RN  Outcome: Progressing  5/2/2022 2240 by Nicole Slade LPN  Outcome: Progressing     Problem: Safety - Adult  Goal: Free from fall injury  5/3/2022 0726 by Anil Rutherford RN  Outcome: Progressing  5/2/2022 2240 by Nicole Slade LPN  Outcome: Progressing     Problem: ABCDS Injury Assessment  Goal: Absence of physical injury  5/3/2022 0726 by Anil Rutherford RN  Outcome: Progressing  5/2/2022 2240 by Nicole Slade LPN  Outcome: Progressing     Problem: Skin/Tissue Integrity  Goal: Absence of new skin breakdown  Description: 1. Monitor for areas of redness and/or skin breakdown  2. Assess vascular access sites hourly  3. Every 4-6 hours minimum:  Change oxygen saturation probe site  4. Every 4-6 hours:  If on nasal continuous positive airway pressure, respiratory therapy assess nares and determine need for appliance change or resting period.   5/3/2022 0726 by Ainl Rutherford RN  Outcome: Progressing  5/2/2022 2240 by Nicole Slade LPN  Outcome: Progressing     Problem: Nutrition Deficit:  Goal: Optimize nutritional status  5/3/2022 0726 by Anil Rutherford RN  Outcome: Progressing  5/2/2022 2240 by Nicole Slade LPN  Outcome: Progressing     Problem: Pain  Goal: Verbalizes/displays adequate comfort level or baseline comfort level  5/3/2022 0726 by Anil Rutherford RN  Outcome: Progressing  5/2/2022 2240 by Nicole Slade LPN  Outcome: Progressing

## 2022-05-03 NOTE — PROGRESS NOTES
Pt discharged to group home where he lives. Picked up by Clif Garcia- his . AVS reviewed - pt signed- he's his own guardian. Questions by  answered by [facility] . Suggestion for life alert for pt- stated she will follow up. Pt cautioned to use walker at all times; not to  Shower when he is home alone. Wait for attendent to come to home. All belongings taken including walker and script for brodie andrade.   Maribel Anderson RN

## 2022-05-03 NOTE — PROGRESS NOTES
Bradly Velasquez    : 1965  Acct #: [de-identified]  MRN: [id number]              PM&R Progress Note      Admitting diagnosis:  Acute respiratory failure ([address])     Comorbid diagnoses impacting rehabilitation: Influenza A pneumonia, hypoxemia, generalized weakness, gait disturbance, seizure disorder, essential hypertension, cerebral palsy    Chief complaint: Anxious to get home tomorrow. No particular complaints. Staff reports some fatigue with activity as before. Prior (baseline) level of function: Independent. Current level of function:         Current  IRF-BENNETT and Goals:   Occupational Therapy:    Short Term Goals  Time Frame for Short term goals: STGs=LTGs :   Long Term Goals  Time Frame for Long term goals : 7-10 days or until d/c. Long Term Goal 1: Pt will complete self-feeding c Ind. Long Term Goal 2: Pt will complete grooming tasks c Mod I.  Long Term Goal 3: Pt will complete total body bathing c Mod I.  Long Term Goal 4: Pt will complete UB dressing c Ind. Long Term Goal 5: Pt will complete LB dressing c Mod I. Additional Goals?: Yes  Long Term Goal 6: Pt will doff/don footwear c Mod I.  Long Term Goal 7: Pt will complete toileting c Mod I.  Long Term Goal 8: Pt will perform functional transfers (bed, chair, toilet, shower) c DME PRN and Mod I.  Long Term Goal 9: Pt will perform therex/therax to facilitate increased strength/endurance/ax tolerance (c emphasis on dynamic standing balance/tolerance > 8 mins) c Mod I.  Long Term Goal 10:  Pt will complete home management tasks c Mod I. :                                       Eating: Eating  Assistance Needed: Independent  Comment: X  CARE Score: 6  Discharge Goal: Independent       Oral Hygiene: Oral Hygiene  Assistance Needed: Independent  Comment: X  CARE Score: 6  Discharge Goal: Independent    UB/LB Bathing: Shower/Bathe Self  Assistance Needed: Independent  Comment: X  CARE Score: 6  Discharge Goal: Independent    UB Dressing: Upper Body Dressing  Assistance Needed: Independent  Comment: X  CARE Score: 6  Discharge Goal: Independent         LB Dressing: Lower Body Dressing  Assistance Needed: Independent  Comment: X  CARE Score: 6  Discharge Goal: Independent    Donning and [employer]ike Footwear: Putting On/Taking Off Footwear  Assistance Needed: Independent  Comment: X  CARE Score: 6  Discharge Goal: Independent      Toileting: Toileting Hygiene  Assistance needed: Independent  Comment: Mod I  CARE Score: 6  Discharge Goal: Independent      Toilet Transfers: Toilet Transfer  Assistance needed: Independent  Comment: Mod I  CARE Score: 6  Discharge Goal: Independent    Physical Therapy:         Long Term Goals  Time Frame for Long term goals : 10 tx days:  Long term goal 1: Pt will complete bed mobility (scooting, rolling R/L, and sup<->sit) Ind.  Long term goal 2: Pt will complete OOB transfers Mod Ind-Ind. Long term goal 3: Pt will ambulate 150 ft over level surface using LRAD prn Mod Ind-Ind. Long term goal 4: Pt will ambulate at least 10 ft of uneven surface and ascend/descend curb step using LRAD with SBA-Sup. Long term goal 5: Pt will ascend/descend 1 flight of stairs using railings with SBA. Long term goal 6: Pt will complete object retrieval from the floor using reacher Mod Ind.       Bed Mobility:   Sit to Lying  Assistance Needed: Independent  Comment: Practiced in regular flat bed without rails  CARE Score: 6  Discharge Goal: Independent  Roll Left and Right  Assistance Needed: Independent  Comment: Practiced in regular flat bed without rails  CARE Score: 6  Discharge Goal: Independent  Lying to Sitting on Side of Bed  Assistance Needed: Independent  Comment: Practiced in regular flat bed without rails  CARE Score: 6  Discharge Goal: Independent    Transfers:    Sit to Stand  Assistance Needed: Independent  Comment: mod I with RW  CARE Score: 6  Discharge Goal: Independent  Chair/Bed-to-Chair Transfer  Assistance Needed: Independent  Comment: Mod I with RW  CARE Score: 6  Discharge Goal: Independent     Car Transfer  Assistance Needed: Independent  Comment: mod I with RW  CARE Score: 6  Discharge Goal: Independent    Ambulation:    Walking Ability  Does the Patient Walk?: Yes     Walk 10 Feet  Assistance Needed: Supervision or touching assistance  Comment: Supervision with RW d/t safety issues  CARE Score: 4  Discharge Goal: Independent     Walk 50 Feet with Two Turns  Assistance Needed: Supervision or touching assistance  Comment: Supervision with RW d/t safety issues  Reason if not Attempted: Not attempted due to medical condition or safety concerns  CARE Score: 4  Discharge Goal: Independent     Walk 150 Feet  Assistance Needed: Supervision or touching assistance  Comment: Supervision with RW d/t safety issues  Reason if not Attempted: Not attempted due to medical condition or safety concerns  CARE Score: 4  Discharge Goal: Independent     Walking 10 Feet on Uneven Surfaces  Assistance Needed: Supervision or touching assistance  Comment: SBA with RW (for safety issues)  CARE Score: 4  Discharge Goal: Supervision or touching assistance     1 Step (Curb)  Assistance Needed: Supervision or touching assistance  Comment: SBA with RW. (For safety issues)  CARE Score: 4  Discharge Goal: Supervision or touching assistance     4 Steps  Assistance Needed: Supervision or touching assistance  Comment: SBA with 2 rails.   CARE Score: 4  Discharge Goal: Supervision or touching assistance     12 Steps  Assistance Needed: Supervision or touching assistance  Comment: SBA with 2 rails  Reason if not Attempted: Not attempted due to medical condition or safety concerns  CARE Score: 4  Discharge Goal: Supervision or touching assistance       Wheelchair:  w/c Ability: Wheelchair Ability  Uses a Wheelchair and/or Scooter?: No                Balance:        Object: Picking Up Object  Assistance Needed: Supervision or touching assistance  Comment: Supervision with RW. Pt used reacher. CARE Score: 4  Discharge Goal: Independent    I      Exam:    Blood pressure (!) 131/91, pulse 84, temperature 97.5 °F (36.4 °C), temperature source Oral, resp. rate 18, height 5' 7\" (1.702 m), weight 160 lb 7.9 oz (72.8 kg), SpO2 93 %. General: Up in a bedside chair. Legs slightly elevated. Gazing right and left. HEENT: Minimal dysarthria as before. Neck supple. Gazing right and left. Pulmonary: No wheezes, rales or rhonchi. Cardiac: Regular rate and rhythm. Abdomen: Patient's abdomen is soft and nondistended. Bowel sounds were present throughout. There was no rebound, guarding or masses noted. Upper extremities: Increased tone across the elbows and wrist but he can manipulate items with his hands. Lower extremities: Coarse movements at the hips, knees and ankles with increased tone. No swelling or bruising. Sitting balance was good. Standing balance was fair-.    Lab Results   Component Value Date    WBC 7.7 04/28/2022    HGB 15.1 04/28/2022    HCT 44.3 04/28/2022    MCV 91.9 04/28/2022     04/28/2022     Lab Results   Component Value Date    INR 1.13 12/02/2019    INR 1.08 11/30/2019    PROTIME 12.9 12/02/2019    PROTIME 12.3 11/30/2019     Lab Results   Component Value Date    CREATININE 0.8 (L) 04/28/2022    BUN 9 04/28/2022     04/28/2022    K 3.6 04/28/2022     04/28/2022    CO2 26 04/28/2022     Lab Results   Component Value Date    ALT 36 04/28/2022    AST 20 04/28/2022    ALKPHOS 90 04/28/2022    BILITOT 0.6 04/28/2022       Expected length of stay  prior to a supervised level of function for discharge home with a walker and Kal[facility] 77 OT/PT is 5/3/2022. Recommendations:    1. Acute respiratory failure with gait dysfunction: We will transition to home therapy based program after discharge tomorrow. Arvil Pickaway training will remain the focus although he does use a wheelchair when son is not available to help him with his gait. We have weaned him from the supplemental.   Some urinary urgency.  No bowel incontinence.  Ongoing duo nebs, cough medicine and adaptive equipment training.  Verbal cues and SBA-CGA for transfers today. 2. DVT prophylaxis: Lovenox 40 mg subcu daily.  I must monitor his hemoglobin and platelet count periodically while on this medication.  Weightbearing activities are slowly improving daily.  GI prophylaxis provided.  No clinical signs of blood loss.   3. Seizure disorder: Tolerating his home AED med of Keppra 750 mg twice daily.  Consistent alertness seems to indicate he is adjusting to these meds.   He is sleeping better.  No clinical evidence of active seizures. 4. Hypertension: Norvasc to manage his blood pressure.  Target systolic blood pressure is 120-140.  Vital signs are checked at rest and with activity.  Blood pressure is essentially within target range again today.   5. Depression with anxiety: 40 mg daily.  More consistent sleep noted.  Involvement of his group home staff when possible.  Receiving encouragement from our staff.

## 2022-05-03 NOTE — PLAN OF CARE
Problem: Discharge Planning  Goal: Discharge to home or other facility with appropriate resources  5/2/2022 2240 by Georgiana Chance LPN  Outcome: Progressing  5/2/2022 0928 by Clover Red RN  Outcome: Progressing     Problem: Safety - Adult  Goal: Free from fall injury  5/2/2022 2240 by Georgiana Chance LPN  Outcome: Progressing  5/2/2022 0928 by Clover Red RN  Outcome: Progressing     Problem: ABCDS Injury Assessment  Goal: Absence of physical injury  5/2/2022 2240 by Georgiana Chance LPN  Outcome: Progressing  5/2/2022 0928 by Clover Red RN  Outcome: Progressing     Problem: Skin/Tissue Integrity  Goal: Absence of new skin breakdown  Description: 1. Monitor for areas of redness and/or skin breakdown  2. Assess vascular access sites hourly  3. Every 4-6 hours minimum:  Change oxygen saturation probe site  4. Every 4-6 hours:  If on nasal continuous positive airway pressure, respiratory therapy assess nares and determine need for appliance change or resting period.   5/2/2022 2240 by Georgiana Chance LPN  Outcome: Progressing  5/2/2022 0928 by Clover Red RN  Outcome: Progressing     Problem: Nutrition Deficit:  Goal: Optimize nutritional status  5/2/2022 2240 by Georgiana Chance LPN  Outcome: Progressing  5/2/2022 0928 by Clover Red RN  Outcome: Progressing     Problem: Pain  Goal: Verbalizes/displays adequate comfort level or baseline comfort level  5/2/2022 2240 by Georgiana Chance LPN  Outcome: Progressing  5/2/2022 0928 by Clover Red RN  Outcome: Progressing

## 2022-05-03 NOTE — CARE COORDINATION
[facility] notes & orders faxed to [address] [place] [address] Rd.    1100:  Patient's  Austin Mullins here for dc transport. Austin Mullins is now reporting that patient will be alone 8:30-2 daily. Previously she said there were 2 attendants available most of the day. Case mgt reviewed chart and neither continuous assistance nor continuous supervision are recommended. Reviewed with patient and Austin Mullins that patient is to use RW all the time. Recommended he shower with OT only, but with someone home at least.n  Case mgt concerned patient's impulsiveness in regard to regular RW use. Inquired about medicaid . Austin Mullins will look up contact info and refer back to ARU CM. Case mgt will call to see about a medical alert system and aide hours for patient.               ARU  Discharge Summary    D/C Date: 5/3/22    Patient discharged to:   Group home with attendants    Transported by:   [facility] ARUNA Mullins    Referrals made to:  Catherine Worley, [address] [place] [address] Rd    Additional information: see concerns noted above    Caregiver training:

## 2022-05-03 NOTE — PROGRESS NOTES
Pulmonary and Critical Care  Progress Note    Subjective: The patient is sitting in the chair. doing well. On RA. Shortness of breath none. Chest pain none. Addressing respiratory complaints Patient is negative for  hemoptysis and cyanosis. CONSTITUTIONAL:  negative for fevers and chills. Past Medical History:     has a past medical history of Cerebral palsy ([address] Depression, Hypertension, and Seizures ([address] has a past surgical history that includes Eye surgery; Abdomen surgery; and Upper gastrointestinal endoscopy (N/A, 12/03/2019). reports that he has never smoked. He has never used smokeless tobacco. He reports that he does not drink alcohol and does not use drugs. Family history:  family history is not on file. Allergies   Allergen Reactions    Pcn [Penicillins]      Social History:    Reviewed; no changes    Objective:   PHYSICAL EXAM:        VITALS:  /83   Pulse 84   Temp 98.1 °F (36.7 °C) (Oral)   Resp 16   Ht 5' 7\" (1.702 m)   Wt 158 lb 11.7 oz (72 kg)   SpO2 93%   BMI 24.86 kg/m²     24HR INTAKE/OUTPUT:      Intake/Output Summary (Last 24 hours) at 5/3/2022 1002  Last data filed at 5/3/2022 0954  Gross per 24 hour   Intake 577 ml   Output 575 ml   Net 2 ml       CONSTITUTIONAL:  Awake. LUNGS:  decreased breath sounds, occ basilar crackles. CARDIOVASCULAR:  normal S1 and S2 and negative JVD  ABD:Abdomen soft, non-tender. BS normal. No masses,  No organomegaly. [place] Officer. DATA:    CBC:  No results for input(s): WBC, RBC, HGB, HCT, PLT, MCV, MCH, MCHC, RDW, NRBC, SEGSPCT, BANDSPCT in the last 72 hours. BMP:  No results for input(s): NA, K, CL, CO2, BUN, CREATININE, CALCIUM, GLUCOSE in the last 72 hours. ABG:  No results for input(s): PH, PO2ART, KSL2EAH, HCO3, BEART, O2SAT in the last 72 hours.   Lab Results   Component Value Date    PROBNP 25.54 04/19/2022    PROBNP 26.35 04/17/2022    PROBNP 35.21 06/08/2021     No results found for: [facility]    Radiology Review: Pertinent images / reports were reviewed as a part of this visit. Assessment:     Patient Active Problem List   Diagnosis    Pneumonia    Cerebral palsy, hemiplegic (HCC)    Hypoxia    Seizure disorder ([address]    Gait disturbance    Pneumonia of both lungs due to infectious organism    Acute upper GI bleed    Cecal polyp    Sebaceous cyst    Acute respiratory failure with hypoxemia (HCC)    Influenza A    Generalized weakness    Essential hypertension    Spastic quadriplegic cerebral palsy ([address]       Plan:   1. Overall the patient has improved. 2. Inc. Activity.   Rima Jim MD   5/3/2022  10:02 AM

## 2022-06-01 ENCOUNTER — HOSPITAL ENCOUNTER (OUTPATIENT)
Age: 57
Discharge: HOME | End: 2022-06-01
Payer: MEDICARE

## 2022-06-01 ENCOUNTER — HOSPITAL ENCOUNTER (OUTPATIENT)
Dept: GENERAL RADIOLOGY | Age: 57
Discharge: HOME | End: 2022-06-01
Payer: MEDICARE

## 2022-06-01 DIAGNOSIS — J18.9 PNEUMONIA DUE TO INFECTIOUS ORGANISM, UNSPECIFIED LATERALITY, UNSPECIFIED PART OF LUNG: ICD-10-CM

## 2022-06-01 PROCEDURE — 71046 X-RAY EXAM CHEST 2 VIEWS: CPT

## 2022-06-02 NOTE — DISCHARGE SUMMARY
Patient Name: Bernard Cisneros  Patient :  1965  Patient MRN:   [id number]      Admission Date:  2022  Discharge Date:   5/3/2022    Admitting diagnosis: Acute respiratory failure ( [address])     Comorbid diagnoses impacting rehabilitation: Influenza A pneumonia, hypoxemia, generalized weakness, gait disturbance, seizure disorder, essential hypertension, cerebral palsy    Discharging diagnosis: Acute respiratory failure ( [address])     Comorbid diagnoses impacting rehabilitation: Influenza A pneumonia, hypoxemia, generalized weakness, gait disturbance, seizure disorder, essential hypertension, cerebral palsy     History of present illness: The patient is a 69-year-old right-hand-dominant male with chronic gait disturbance and left upper limb weakness related to cerebral palsy. He also has a seizure disorder. On 2022 he presented to our ED with increasing shortness of breath and some chest pain. There had been a 3-day history of progressive cough with sputum production and progressive weakness. In our ED he was hypoxic and tachypneic with bilateral pneumonia on chest x-ray. He required supplemental oxygen, steroid therapy and nebulizers. He did not require intubation. His respiratory panel revealed infection with influenza A. His prophylactic antibiotics were withdrawn and he also struggled with diarrhea. C. difficile toxin assay was negative. Prior (baseline) level of function: Independent. Current level of function:         Current  IRF-BENNETT and Goals:   Occupational Therapy:    Short Term Goals  Time Frame for Short term goals: STGs=LTGs :   Long Term Goals  Time Frame for Long term goals : 7-10 days or until d/c. Long Term Goal 1: Pt will complete self-feeding c Ind. Long Term Goal 2: Pt will complete grooming tasks c Mod I.  Long Term Goal 3: Pt will complete total body bathing c Mod I.  Long Term Goal 4: Pt will complete UB dressing c Ind.   Long Term Goal 5: Pt will complete LB dressing c Mod I. Additional Goals?: Yes  Long Term Goal 6: Pt will doff/don footwear c Mod I.  Long Term Goal 7: Pt will complete toileting c Mod I.  Long Term Goal 8: Pt will perform functional transfers (bed, chair, toilet, shower) c DME PRN and Mod I.  Long Term Goal 9: Pt will perform therex/therax to facilitate increased strength/endurance/ax tolerance (c emphasis on dynamic standing balance/tolerance > 8 mins) c Mod I.  Long Term Goal 10: Pt will complete home management tasks c Mod I. :                                       Eating: Eating  Assistance Needed: Independent  Comment: X  CARE Score: 6  Discharge Goal: Independent       Oral Hygiene: Oral Hygiene  Assistance Needed: Independent  Comment: X  CARE Score: 6  Discharge Goal: Independent    UB/LB Bathing: Shower/Bathe Self  Assistance Needed: Independent  Comment: X  CARE Score: 6  Discharge Goal: Independent    UB Dressing: Upper Body Dressing  Assistance Needed: Independent  Comment: X  CARE Score: 6  Discharge Goal: Independent         LB Dressing: Lower Body Dressing  Assistance Needed: Independent  Comment: X  CARE Score: 6  Discharge Goal: Independent    Donning and [place] Footwear: Putting On/Taking Off Footwear  Assistance Needed: Independent  Comment: X  CARE Score: 6  Discharge Goal: Independent      Toileting: Toileting Hygiene  Assistance needed: Independent  Comment: Mod I  CARE Score: 6  Discharge Goal: Independent      Toilet Transfers: Toilet Transfer  Assistance needed: Independent  Comment: Mod I  CARE Score: 6  Discharge Goal: Independent    Physical Therapy:         Long Term Goals  Time Frame for Long term goals : 10 tx days:  Long term goal 1: Pt will complete bed mobility (scooting, rolling R/L, and sup<->sit) Ind.  Long term goal 2: Pt will complete OOB transfers Mod Ind-Ind. Long term goal 3: Pt will ambulate 150 ft over level surface using LRAD prn Mod Ind-Ind.   Long term goal 4: Pt will ambulate at least 10 ft of uneven surface and ascend/descend curb step using LRAD with SBA-Sup. Long term goal 5: Pt will ascend/descend 1 flight of stairs using railings with SBA. Long term goal 6: Pt will complete object retrieval from the floor using reacher Mod Ind. Bed Mobility:   Sit to Lying  Assistance Needed: Independent  Comment: Practiced in regular flat bed without rails  CARE Score: 6  Discharge Goal: Independent  Roll Left and Right  Assistance Needed: Independent  Comment: Practiced in regular flat bed without rails  CARE Score: 6  Discharge Goal: Independent  Lying to Sitting on Side of Bed  Assistance Needed: Independent  Comment: Practiced in regular flat bed without rails  CARE Score: 6  Discharge Goal: Independent    Transfers:    Sit to Stand  Assistance Needed: Independent  Comment: mod I with RW  CARE Score: 6  Discharge Goal: Independent  Chair/Bed-to-Chair Transfer  Assistance Needed: Independent  Comment:  Mod I with RW  CARE Score: 6  Discharge Goal: Independent     Car Transfer  Assistance Needed: Independent  Comment: mod I with RW  CARE Score: 6  Discharge Goal: Independent    Ambulation:    Walking Ability  Does the Patient Walk?: Yes     Walk 10 Feet  Assistance Needed: Supervision or touching assistance  Comment: Supervision with RW d/t safety issues  CARE Score: 4  Discharge Goal: Independent     Walk 50 Feet with Two Turns  Assistance Needed: Supervision or touching assistance  Comment: Supervision with RW d/t safety issues  Reason if not Attempted: Not attempted due to medical condition or safety concerns  CARE Score: 4  Discharge Goal: Independent     Walk 150 Feet  Assistance Needed: Supervision or touching assistance  Comment: Supervision with RW d/t safety issues  Reason if not Attempted: Not attempted due to medical condition or safety concerns  CARE Score: 4  Discharge Goal: Independent     Walking 10 Feet on Uneven Surfaces  Assistance Needed: Supervision or touching assistance  Comment: SBA with RW (for safety issues)  CARE Score: 4  Discharge Goal: Supervision or touching assistance     1 Step (Curb)  Assistance Needed: Supervision or touching assistance  Comment: SBA with RW. (For safety issues)  CARE Score: 4  Discharge Goal: Supervision or touching assistance     4 Steps  Assistance Needed: Supervision or touching assistance  Comment: SBA with 2 rails. CARE Score: 4  Discharge Goal: Supervision or touching assistance     12 Steps  Assistance Needed: Supervision or touching assistance  Comment: SBA with 2 rails  Reason if not Attempted: Not attempted due to medical condition or safety concerns  CARE Score: 4  Discharge Goal: Supervision or touching assistance       Wheelchair:  w/c Ability: Wheelchair Ability  Uses a Wheelchair and/or Scooter?: No                Balance:        Object: Picking Up Object  Assistance Needed: Supervision or touching assistance  Comment: Supervision with RW. Pt used reacher. CARE Score: 4  Discharge Goal: Independent    I      Exam:    Blood pressure 127/83, pulse 84, temperature 98.1 °F (36.7 °C), temperature source Oral, resp. rate 18, height 5' 7\" (1.702 m), weight 158 lb 11.7 oz (72 kg), SpO2 93 %. General: Sitting in wheelchair. Requiring others to move it for him. Fair trunk control. Oriented x2-3. In no distress. HEENT: MMM. Neck supple. Gazing right and left. Pulmonary: No wheezes, rales or rhonchi. Cardiac: Regular rate and rhythm. Abdomen: Patient's abdomen is soft and nondistended. Bowel sounds were present throughout. There was no rebound, guarding or masses noted. Upper extremities: Increased tone across the elbows and wrist but he can manipulate items with his hands. Lower extremities: Coarse movements at the hips, knees and ankles with increased tone. No swelling or bruising. Sitting balance was good.   Standing balance was fair-.    Lab Results   Component Value Date    WBC 7.7 04/28/2022    HGB 15.1 04/28/2022    HCT 44.3 04/28/2022    MCV 91.9 04/28/2022     04/28/2022     Lab Results   Component Value Date    INR 1.13 12/02/2019    INR 1.08 11/30/2019    PROTIME 12.9 12/02/2019    PROTIME 12.3 11/30/2019     Lab Results   Component Value Date    CREATININE 0.8 (L) 04/28/2022    BUN 9 04/28/2022     04/28/2022    K 3.6 04/28/2022     04/28/2022    CO2 26 04/28/2022     Lab Results   Component Value Date    ALT 36 04/28/2022    AST 20 04/28/2022    ALKPHOS 90 04/28/2022    BILITOT 0.6 04/28/2022         The patient presented to the ARU with the above history requiring a multidisciplinary treatment plan including close medical supervision by the Mike Meraz Director. The patient participated in the prescribed therapy treatment plan with reasonable compliance and progressive tolerance. They avoided significant medical complications. By the time of discharge the patient had become safer with adaptive equipment to transfer and toilet with a FWW with the supervision of family/caregivers. The patient was tolerating an oral diet without choking/coughing and was back to their baseline with regards to bowel and bladder control. Discharge instructions were reviewed with the patient and his caregivers at the group home. The patient is to follow up with the PCP in 1 week. No driving. [facility] PT/OT/RN will be arranged.        Medication List      START taking these medications    guaiFENesin 600 MG extended release tablet  Commonly known as: MUCINEX  Take 1 tablet by mouth 2 times daily        CONTINUE taking these medications    acetaminophen 325 mg tablet  Commonly known as: Aminofen  Take 2 tablets by mouth every 6 hours as needed for Pain     albuterol sulfate  (90 Base) MCG/ACT inhaler  Inhale 2 puffs into the lungs every 2 hours as needed for Wheezing or Shortness of Breath     amLODIPine 10 MG tablet  Commonly known as: NORVASC  Take 1 tablet by mouth every morning     levETIRAcetam 750 MG tablet  Commonly known as: KEPPRA     levothyroxine 25 MCG tablet  Commonly known as: SYNTHROID     PARoxetine 40 MG tablet  Commonly known as: PAXIL  Take 1 tablet by mouth every morning        ASK your doctor about these medications    benzonatate 100 MG capsule  Commonly known as: TESSALON  Take 1 capsule by mouth 3 times daily for 10 days  Ask about: Should I take this medication? Where to Get Your Medications      You can get these medications from any pharmacy    Bring a paper prescription for each of these medications  · benzonatate 100 MG capsule  You don't need a prescription for these medications  · guaiFENesin 600 MG extended release tablet         CONDITION ON DISCHARGE: Stable. The prognosis is fair for further improvements in ADL's and safety with adapted gait/transfers. Record review, patient exam, discharge instructions, medication reconciliation and summary for this discharge visit took more than 30 minutes.

## 2022-06-20 ENCOUNTER — APPOINTMENT (OUTPATIENT)
Dept: CT IMAGING | Age: 57
End: 2022-06-20
Payer: MEDICARE

## 2022-06-20 ENCOUNTER — HOSPITAL ENCOUNTER (EMERGENCY)
Age: 57
Discharge: HOME | End: 2022-06-20
Attending: EMERGENCY MEDICINE
Payer: MEDICARE

## 2022-06-20 VITALS
HEART RATE: 72 BPM | RESPIRATION RATE: 17 BRPM | WEIGHT: 170 LBS | HEIGHT: 67 IN | BODY MASS INDEX: 26.68 KG/M2 | TEMPERATURE: 98.6 F | DIASTOLIC BLOOD PRESSURE: 91 MMHG | SYSTOLIC BLOOD PRESSURE: 145 MMHG | OXYGEN SATURATION: 92 %

## 2022-06-20 DIAGNOSIS — S01.81XA FACIAL LACERATION, INITIAL ENCOUNTER: Primary | ICD-10-CM

## 2022-06-20 DIAGNOSIS — S09.90XA CLOSED HEAD INJURY, INITIAL ENCOUNTER: ICD-10-CM
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
## 2022-06-20 PROCEDURE — 72125 CT NECK SPINE W/O DYE: CPT

## 2022-06-20 PROCEDURE — 6360000002 HC RX W HCPCS: Performed by: EMERGENCY MEDICINE

## 2022-06-20 PROCEDURE — 90471 IMMUNIZATION ADMIN: CPT | Performed by: EMERGENCY MEDICINE

## 2022-06-20 PROCEDURE — 90715 TDAP VACCINE 7 YRS/> IM: CPT | Performed by: EMERGENCY MEDICINE

## 2022-06-20 PROCEDURE — 12011 RPR F/E/E/N/L/M 2.5 CM/<: CPT

## 2022-06-20 PROCEDURE — 70450 CT HEAD/BRAIN W/O DYE: CPT

## 2022-06-20 PROCEDURE — 99284 EMERGENCY DEPT VISIT MOD MDM: CPT

## 2022-06-20 RX ORDER — ACETAMINOPHEN 325 MG/1
650 TABLET ORAL EVERY 6 HOURS PRN
Qty: 30 TABLET | Refills: 0 | Status: SHIPPED | OUTPATIENT
Start: 2022-06-20

## 2022-06-20 RX ADMIN — TETANUS TOXOID, REDUCED DIPHTHERIA TOXOID AND ACELLULAR PERTUSSIS VACCINE, ADSORBED 0.5 ML: 5; 2.5; 8; 8; 2.5 SUSPENSION INTRAMUSCULAR at 07:08

## 2022-06-20 ASSESSMENT — PAIN DESCRIPTION - ONSET: ONSET: SUDDEN

## 2022-06-20 ASSESSMENT — PAIN - FUNCTIONAL ASSESSMENT
PAIN_FUNCTIONAL_ASSESSMENT: 0-10
PAIN_FUNCTIONAL_ASSESSMENT: NONE - DENIES PAIN

## 2022-06-20 ASSESSMENT — PAIN DESCRIPTION - PAIN TYPE: TYPE: ACUTE PAIN

## 2022-06-20 ASSESSMENT — PAIN DESCRIPTION - LOCATION: LOCATION: FACE;KNEE

## 2022-06-20 ASSESSMENT — PAIN DESCRIPTION - ORIENTATION: ORIENTATION: LEFT

## 2022-06-20 ASSESSMENT — PAIN DESCRIPTION - FREQUENCY
FREQUENCY: CONTINUOUS
FREQUENCY: CONTINUOUS

## 2022-06-20 ASSESSMENT — PAIN SCALES - GENERAL: PAINLEVEL_OUTOF10: 2

## 2022-06-20 ASSESSMENT — PAIN DESCRIPTION - DESCRIPTORS: DESCRIPTORS: ACHING

## 2022-06-20 NOTE — ED TRIAGE NOTES
Pt to ED via EMS with c/o fall with facial laceration, and left knee pain. Pt arrived with c-collar on with small amounts of pain in his neck.

## 2022-06-20 NOTE — ED NOTES
Pt d/c'd home with caregiver & belongings. Pt amb to waiting room without difficulty. Resp even & non-labored. Pt states understanding to d/c instructions. No questions noted.       Rocio Nagel RN  06/20/22 3703

## 2022-06-20 NOTE — ED NOTES
Received report from [facility]. Assumed care @ this time.       Jeanette Choudhury RN  06/20/22 6015

## 2022-06-20 NOTE — ED PROVIDER NOTES
[address] ENCOUNTER      Pt Name: Stacey Ryan  CE  [place] 1965  Date of evaluation: 2022  Provider: Misael Polk PA-C        MEDICAL DECISION MAKING / ED COURSE:      This is a procedure note only. I assisted ED attending Dr. Don Stewart with suture only. Please see her documentation for additional details and final disposition patient. PROCEDURES:   Lac Repair    Date/Time: 2022 9:06 AM  Performed by: Misael Polk PA-C  Authorized by: Mert Davenport MD     Consent:     Consent obtained:  Verbal    Consent given by:  Patient    Risks discussed:  Pain and poor cosmetic result    Alternatives discussed:  Delayed treatment and no treatment  Anesthesia (see MAR for exact dosages):      Anesthesia method:  Local infiltration and nerve block    Local anesthetic:  Lidocaine 1% WITH epi    Block location:  Supraorbital    Block needle gauge:  27 G    Block anesthetic:  Lidocaine 1% WITH epi    Block injection procedure:  Anatomic landmarks identified, introduced needle, incremental injection and negative aspiration for blood    Block outcome:  Incomplete block  Laceration details:     Location:  Face    Face location:  Forehead (right)    Length (cm):  2.5  Repair type:     Repair type:  Simple  Pre-procedure details:     Preparation:  Patient was prepped and draped in usual sterile fashion  Exploration:     Hemostasis achieved with:  Direct pressure and epinephrine    Wound exploration: entire depth of wound probed and visualized      Wound extent: no fascia violation noted, no foreign bodies/material noted, no muscle damage noted, no nerve damage noted, no tendon damage noted and no vascular damage noted      Contaminated: no    Treatment:     Area cleansed with:  Saline and Hibiclens    Amount of cleaning:  Standard    Irrigation solution:  Sterile saline    Irrigation volume:  200 Irrigation method:  Pressure wash    Visualized foreign bodies/material removed: no    Skin repair:     Repair method:  Sutures    Suture size:  5-0    Suture technique:  Simple interrupted    Number of sutures:  5  Approximation:     Approximation:  Close  Post-procedure details:     Dressing:  Open (no dressing)    Patient tolerance of procedure: Tolerated well, no immediate complications      Portions of suturing performed by medical student, under my direct supervision.     (Please note this note were completed with a voice recognition program.  Efforts were made to edit the dictations but occasionally words are mis-transcribed.)    Electronically signed, Marcelino Reyna PA-C,          Marcelino Reyna PA-C  06/21/22 0200

## 2022-06-20 NOTE — ED NOTES
TRISHA Davis @ BS for sutures. resp even & non-labored. Will cont to monitor.       Titi Murray RN  06/20/22 3968

## 2022-06-20 NOTE — ED PROVIDER NOTES
CHIEF COMPLAINT  Chief Complaint   Patient presents with   [place] Camper Laceration     Facial       HPI  Dayan Brady is a 61 y.o. male with history of CP, seizures, hypertension who presents after trip and fall at home this morning just prior to arrival.  He did sustain a laceration superior to his right eyebrow. Denies loss of consciousness. He does have some associated centralized, midline neck pain. Denies any numbness or weakness in the arms or legs. Denies any trauma to the chest, abdomen or arms or legs. Pain is aching, throbbing and well localized to the area of laceration. Denies blood thinners or bleeding disorders. Signs and symptoms moderate and persistent, present at time of evaluation here. REVIEW OF SYSTEMS  Review of Systems   History obtained from chart review and the patient  General ROS: negative for - chills or fever  Ophthalmic ROS: negative for - decreased vision or double vision  ENT ROS: positive for - headaches  Hematological and Lymphatic ROS: negative for - bleeding problems  Endocrine ROS: negative for - unexpected weight changes  Respiratory ROS: no cough, shortness of breath, or wheezing  Cardiovascular ROS: no chest pain or dyspnea on exertion  Gastrointestinal ROS: no abdominal pain, change in bowel habits, or black or bloody stools  Genito-Urinary ROS: no dysuria, trouble voiding, or hematuria  Musculoskeletal ROS: negative for - joint stiffness or joint swelling  Neurological ROS: positive for - headaches  negative for - behavioral changes, confusion or weakness      PAST MEDICAL HISTORY  Past Medical History:   Diagnosis Date    Cerebral palsy ([address]     Depression     Hypertension     Seizures [address] [address]        FAMILY HISTORY  History reviewed. No pertinent family history.     SOCIAL HISTORY  Social History     Socioeconomic History    Marital status: Single     Spouse name: None    Number of children: None    Years of education: None    Highest education level: None   Occupational History    None   Tobacco Use    Smoking status: Never Smoker    Smokeless tobacco: Never Used   Substance and Sexual Activity    Alcohol use: No    Drug use: No    Sexual activity: None   Other Topics Concern    None   Social History Narrative    None     Social Determinants of Health     Financial Resource Strain:     Difficulty of Paying Living Expenses: Not on file   Food Insecurity:     Worried About Running Out of Food in the Last Year: Not on file    Abhijit of Food in the Last Year: Not on file   Transportation Needs:     Lack of Transportation (Medical): Not on file    Lack of Transportation (Non-Medical):  Not on file   Physical Activity:     Days of Exercise per Week: Not on file    Minutes of Exercise per Session: Not on file   Stress:     Feeling of Stress : Not on file   Social Connections:     Frequency of Communication with Friends and Family: Not on file    Frequency of Social Gatherings with Friends and Family: Not on file    Attends Yarsanism Services: Not on file    Active Member of [address] [place] [address] or Organizations: Not on file    Attends Club or Organization Meetings: Not on file    Marital Status: Not on file   Intimate Partner Violence:     Fear of Current or Ex-Partner: Not on file    Emotionally Abused: Not on file    Physically Abused: Not on file    Sexually Abused: Not on file   Housing Stability:     Unable to Pay for Housing in the Last Year: Not on file    Number of [place] in the Last Year: Not on file    Unstable Housing in the Last Year: Not on file       SURGICAL HISTORY  Past Surgical History:   Procedure Laterality Date    ABDOMEN SURGERY      Patient reports that he had surgery for Meckel's diverticulum at age 21 months   [address] N/A 12/03/2019    EGD BIOPSY performed by Adina Bales MD at [address] [facility]  No current facility-administered medications on file prior to encounter. Current Outpatient Medications on File Prior to Encounter   Medication Sig Dispense Refill    guaiFENesin (MUCINEX) 600 MG extended release tablet Take 1 tablet by mouth 2 times daily      levETIRAcetam (KEPPRA) 750 MG tablet Take 750 mg by mouth 2 times daily      albuterol sulfate  (90 Base) MCG/ACT inhaler Inhale 2 puffs into the lungs every 2 hours as needed for Wheezing or Shortness of Breath 1 Inhaler 0    levothyroxine (SYNTHROID) 25 MCG tablet Take 25 mcg by mouth Daily      PARoxetine (PAXIL) 40 MG tablet Take 1 tablet by mouth every morning 30 tablet 0    amLODIPine (NORVASC) 10 MG tablet Take 1 tablet by mouth every morning 30 tablet 0         ALLERGIES  Allergies   Allergen Reactions    Pcn [Penicillins]        PHYSICAL EXAM  VITAL SIGNS: BP (!) 145/91   Pulse 72   Temp 98.6 °F (37 °C) (Oral)   Resp 17   Ht 5' 7\" (1.702 m)   Wt 170 lb (77.1 kg)   SpO2 92%   BMI 26.63 kg/m²   Constitutional: Well developed, Well nourished, resting in bed, pleasant  HENT: Linear laceration superior to the right eyebrow, 3 cm, Bilateral external ears normal, Oropharynx moist, No oral exudates, Nose normal.   Eyes: PERRL, EOMI, Conjunctiva normal, No discharge. Neck: Normal range of motion, Supple, No stridor. Midline tenderness overlying C3 and C4, improved at time of recheck  Cardiovascular: Normal heart rate, Normal rhythm, No murmurs, No rubs, No gallops. Thorax & Lungs: Normal breath sounds, No respiratory distress, No wheezing, No chest tenderness. Abdomen: Bowel sounds normal, Soft, No tenderness, no guarding, no rebound, No masses, No pulsatile masses. Skin: Warm, Dry, No erythema, No rash. Extremities: Intact distal pulses, No edema, No tenderness, No cyanosis, No clubbing. Musculoskeletal: Good gross range of motion in all major joints. No major deformities noted.    Neurologic: Alert & oriented x 3, Normal gross motor function, Normal gross sensory function, No focal deficits noted. Psychiatric: Affect normal      RADIOLOGY/PROCEDURES/LABS  Last Imaging results   CT Cervical Spine WO Contrast   Final Result   No acute abnormality of the cervical spine. CT Head WO Contrast   Final Result   No acute intracranial abnormality. Stable appearance of the ventricles. Imaging reviewed by myself      Medications   tetanus-diphth-acell pertussis (BOOSTRIX) injection 0.5 mL (0.5 mLs IntraMUSCular Given 6/20/22 0708)       COURSE & MEDICAL DECISION MAKING  Pertinent Labs & Imaging studies reviewed. (See chart for details)    58-year-old male presents with facial laceration and closed head injury after a fall. The laceration was repaired per the PA, see his note for full details of care. I did perform the substantiated portion of the evaluation including review of imaging and disposition. Head CT without evidence of ICH or cervical spine disruption. Tetanus updated. Patient tolerated repair well, discharged for suture removal in 1 week. Strict return precautions put into place. Concussion precautions put into place. FINAL IMPRESSION  Problem List Items Addressed This Visit     None      Visit Diagnoses     Facial laceration, initial encounter    -  Primary    Closed head injury, initial encounter        Fall, initial encounter          1.    2.   3.    Patient gave me permission to discuss medical history, care, and plan with those present in the room.   Electronically signed by: Kiara Coelho MD, 6/20/2022  MD Kiara Stone MD  06/20/22 5331

## 2022-11-14 NOTE — PROGRESS NOTES
Pulmonary and Critical Care  Progress Note    Subjective: The patient is better. Shortness of breath none. Chest pain none. Addressing respiratory complaints Patient is negative for  hemoptysis and cyanosis. CONSTITUTIONAL:  negative for fevers and chills. Past Medical History:     has a past medical history of Cerebral palsy ([facility] [address] Depression, Hypertension, and Seizures ([facility] [address] has a past surgical history that includes Eye surgery; Abdomen surgery; and Upper gastrointestinal endoscopy (N/A, 12/3/2019). reports that he has never smoked. He has never used smokeless tobacco. He reports that he does not drink alcohol and does not use drugs. Family history:  family history is not on file. No Known Allergies  Social History:    Reviewed; no changes    Objective:   PHYSICAL EXAM:        VITALS:  BP (!) 120/92   Pulse 83   Temp 98.5 °F (36.9 °C) (Oral)   Resp 21   Ht 5' 7\" (1.702 m)   Wt 170 lb 10.2 oz (77.4 kg)   SpO2 93%   BMI 26.73 kg/m²     24HR INTAKE/OUTPUT:    No intake or output data in the 24 hours ending 06/09/21 1030    CONSTITUTIONAL:  awake, alert, cooperative, no apparent distress, and appears stated age. LUNGS:  decreased breath sounds, occ basilar crackles. CARDIOVASCULAR:  normal S1 and S2 and negative JVD  ABD:Abdomen soft, non-tender. BS normal. No masses,  No organomegaly  NEURO:Alert.   DATA:    CBC:  Recent Labs     06/07/21  0552 06/08/21  0249 06/09/21  0606   WBC 10.2 10.8* 10.4   RBC 4.51* 4.76 4.68   HGB 14.3 14.5 14.7   HCT 42.3 44.5 41.6*    418 446*   MCV 93.8 93.5 88.9   MCH 31.7* 30.5 31.4*   MCHC 33.8 32.6 35.3   RDW 11.7 11.5* 11.6*   SEGSPCT 54.6 54.1  --       BMP:  Recent Labs     06/07/21  0552 06/08/21  0249 06/09/21  0606    140 144   K 3.5 3.3* 3.8    102 107   CO2 27 25 26   BUN 12 11 10   CREATININE 0.8* 1.0 0.8*   CALCIUM 8.3 8.5 8.7   GLUCOSE 133* 115* 121*      ABG:  No results for input(s): PH, PO2ART, ABG9VKL, HCO3, BEART, O2SAT in the last 72 hours. Lab Results   Component Value Date    PROBNP 35.21 06/08/2021    PROBNP 31.09 06/04/2021    PROBNP 50.93 06/01/2021     No results found for: [facility]    Radiology Review:  Pertinent images / reports were reviewed as a part of this visit. Assessment:     Patient Active Problem List   Diagnosis    Pneumonia    Cerebral palsy, hemiplegic (HCC)    Hypoxia    Seizure disorder ([facility] [address]    Gait disturbance    Pneumonia of both lungs due to infectious organism    Acute upper GI bleed    Cecal polyp    Sebaceous cyst       Plan:   1. Overall the patient is better. 2. Inc. Activity.   Juarez Du MD MD  6/9/2021  10:30 AM negative - no dysuria

## 2023-02-10 ENCOUNTER — OFFICE VISIT (OUTPATIENT)
Dept: NEUROLOGY | Age: 58
End: 2023-02-10
Payer: MEDICARE

## 2023-02-10 VITALS
HEIGHT: 67 IN | WEIGHT: 165 LBS | OXYGEN SATURATION: 97 % | BODY MASS INDEX: 25.9 KG/M2 | DIASTOLIC BLOOD PRESSURE: 78 MMHG | SYSTOLIC BLOOD PRESSURE: 120 MMHG | HEART RATE: 76 BPM

## 2023-02-10 DIAGNOSIS — G40.409 OTHER GENERALIZED EPILEPSY, NOT INTRACTABLE, WITHOUT STATUS EPILEPTICUS (HCC): Primary | ICD-10-CM

## 2023-02-10 DIAGNOSIS — G80.2 SPASTIC HEMIPLEGIC CEREBRAL PALSY (HCC): ICD-10-CM

## 2023-02-10 PROCEDURE — 99204 OFFICE O/P NEW MOD 45 MIN: CPT | Performed by: PSYCHIATRY & NEUROLOGY

## 2023-02-10 PROCEDURE — 3017F COLORECTAL CA SCREEN DOC REV: CPT | Performed by: PSYCHIATRY & NEUROLOGY

## 2023-02-10 PROCEDURE — G8484 FLU IMMUNIZE NO ADMIN: HCPCS | Performed by: PSYCHIATRY & NEUROLOGY

## 2023-02-10 PROCEDURE — 3074F SYST BP LT 130 MM HG: CPT | Performed by: PSYCHIATRY & NEUROLOGY

## 2023-02-10 PROCEDURE — 1036F TOBACCO NON-USER: CPT | Performed by: PSYCHIATRY & NEUROLOGY

## 2023-02-10 PROCEDURE — G8427 DOCREV CUR MEDS BY ELIG CLIN: HCPCS | Performed by: PSYCHIATRY & NEUROLOGY

## 2023-02-10 PROCEDURE — 3078F DIAST BP <80 MM HG: CPT | Performed by: PSYCHIATRY & NEUROLOGY

## 2023-02-10 PROCEDURE — G8419 CALC BMI OUT NRM PARAM NOF/U: HCPCS | Performed by: PSYCHIATRY & NEUROLOGY

## 2023-02-10 NOTE — PATIENT INSTRUCTIONS
Please contact our office around 12/12/2023 to get your follow up appointment made. Our scheduling phone number is [phone]. Thank you!

## 2023-02-10 NOTE — PROGRESS NOTES
2/10/23    Lucy Perry  1965    Chief Complaint   Patient presents with    New Patient     Seizures        History of Present Illness  Valerio Parsons is a 61 y.o. male presenting today for evaluation of:  Seizure disorder in the setting of cerebral palsy. He is accompanied by [facility] healthcare professional at his facility who is known him for 2 years. 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 He tells me that it was not until his 25s that he started to develop seizures, though. It sounds like his seizures are characterized by possibly complex partial with secondary generalization given the description of staring off followed by convulsions. [facility] states that he may have had a seizure about a year ago but this was not confirmed. He had a spell where he started staring off and had some shaking. Our documentation reflects that he is on Keppra 750 mg twice daily. Daniel Barboza states that he thinks he is on 1000 mg twice daily. [facility] will confirm this she tells me. He keeps a happy and friendly demeanor. He does not have any behavioral issues at the facility. He does work part-time at [employer] working 1 day a week. He washes dishes. In his free time he enjoys watching sports. He does have some family in the area, a sister and 2 nephews. He tells me that his sister is in a facility as well due to her history of seizures from a childhood injury.       Subjective    Review of Symptoms:  Neurologic   Symptoms: sensory disturbances, weakness, difficulty with gait or walking, seizures, no bowel symptoms, no vertigo, no confusion, no memory loss, no speech disorder, no visual loss, no double vision, no dizziness, no loss of hearing, no headaches, no bladder symptoms, no excessive fatigue, and no syncope    Current Outpatient Medications   Medication Sig Dispense Refill    acetaminophen (AMINOFEN) 325 MG tablet Take 2 tablets by mouth every 6 hours as needed for Pain 30 tablet 0    guaiFENesin (MUCINEX) 600 MG extended release tablet Take 1 tablet by mouth 2 times daily      levETIRAcetam (KEPPRA) 750 MG tablet Take 750 mg by mouth 2 times daily      albuterol sulfate  (90 Base) MCG/ACT inhaler Inhale 2 puffs into the lungs every 2 hours as needed for Wheezing or Shortness of Breath 1 Inhaler 0    levothyroxine (SYNTHROID) 25 MCG tablet Take 25 mcg by mouth Daily      PARoxetine (PAXIL) 40 MG tablet Take 1 tablet by mouth every morning 30 tablet 0    amLODIPine (NORVASC) 10 MG tablet Take 1 tablet by mouth every morning 30 tablet 0     No current facility-administered medications for this visit. Past Medical History:   Diagnosis Date    Cerebral palsy ([facility] [address]     Depression     Hypertension     Seizures ([facility] [address]        Past Surgical History:   Procedure Laterality Date    ABDOMEN SURGERY      Patient reports that he had surgery for Meckel's diverticulum at age 21 months    EYE SURGERY      UPPER GASTROINTESTINAL ENDOSCOPY N/A 12/03/2019    EGD BIOPSY performed by Inder Macias MD at [address] [place] [address] History     Socioeconomic History    Marital status: Single   Tobacco Use    Smoking status: Never    Smokeless tobacco: Never   Substance and Sexual Activity    Alcohol use: No    Drug use: No       No family history on file.     Objective    Physical Exam:  Also present during visit: health care professional Nicole    Constitutional   Weight: well nourished  Heart/Vascular   Rate and Rhythm: RRR   Murmurs: none   Arterial Pulses:  no carotid bruits  Neck   Appearance/Palpation/Auscultation: supple  Mental Status   Orientation: oriented to person, oriented to place, oriented to problem, and oriented to time   Mood/Affect: appropriate mood and appropriate affect   Memory/Other: recent memory intact, remote memory intact, fund of knowledge intact, attention span normal, and concentration normal  Language   Language: (normal) language, no dysarthria, (normal) articulation, and no dysphasia/aphasia  Cranial Nerves   CN II Right: visual fields appear intact   CN II Left: visual fields appear intact   CN III, IV, VI: EOM no nystagmus, normal pursuit, and extraocular muscle strength normal   CN III: pupil normal size, pupil reactive to light and dark, pupil accomodates, and no ptosis   CN IV: normal   CN VI: normal   CN V Right: normal sensation and muscles of mastication intact   CN V Left: normal sensation and muscles of mastication intact   CN VII Right: normal facial expression   CN VII Left: normal facial expression   CN VIII Right: hearing in tact to normal conversation   CN VIII Left: hearing in tact to normal conversation   CN IX,X: normal palatal movement   CN XI Right: normal sternocleidomastoid and normal trapezius   CN XI Left: normal sternocleidomastoid and normal trapezius   CN XII: no tremors of the tongue, no fasciculation of the tongue, tongue protrudes midline, normal power to left, and normal power to right  Gait and Stance   Gait/Posture: ambulates independently and spastic gait  Motor/Coordination Exam   General: no bradykinesia, no tremors, no chorea, no athetosis, no myoclonus, and no dyskinesia   Right Upper Extremity: normal bulk, muscle weakness mild, and spastic   Left Upper Extremity: normal motor strength, normal bulk, and normal tone   Right Lower Extremity: normal bulk, muscle weakness mild, and spastic   Left Lower Extremity: normal motor strength, normal bulk, and normal tone  Reflexes   Reflexes Right: DTRS are normal throughout   Reflexes Left: DTRS are normal throughout  Sensory   Sensation: no neglect and decreased light touch and pinprick in the right upper extremity  Spine   Cervical Spine: no tenderness, no dystonia , and full ROM   Thoracic Spine: no spasms, no bony abnormalities, normal curvature, no tenderness, and full ROM   Low Back: full ROM, no pain, no spasms, and no bony abnormalities  Lungs   Auscultation: normal breath sounds  Skin   Inspection: no jaundice, no lesions, no rashes, and no cyanosis      /78 (Site: Right Upper Arm, Position: Sitting, Cuff Size: Medium Adult)   Pulse 76   Ht 5' 7\" (1.702 m)   Wt 165 lb (74.8 kg)   SpO2 97%   BMI 25.84 kg/m²     Assessment and Plan     Diagnosis Orders   1. Other generalized epilepsy, not intractable, without status epilepticus ([facility] [address]        2. Spastic hemiplegic cerebral palsy ([facility]            [facility] is here to establish care for his history of epilepsy in the setting of cerebral palsy. He remains well controlled on Keppra 750 mg twice daily (although he thinks he is on 1000 mg twice daily). They will confirm with the facility and call him. I will not make any changes at this time. He does have spasticity and weakness in his right upper and lower extremities. We discussed the possibility of Botox injections but he does remain fairly active and independent despite the weakness and spasticity. He is uncertain at this time if you would like to pursue Botox but will call let me know if he would like to do so in the future. Return in about 1 year (around 2/10/2024) for Follow-up me or MACKENZIE.     Earnestine Dumont DO

## 2024-10-20 ENCOUNTER — APPOINTMENT (OUTPATIENT)
Dept: CT IMAGING | Age: 59
End: 2024-10-20
Payer: MEDICARE

## 2024-10-20 ENCOUNTER — HOSPITAL ENCOUNTER (EMERGENCY)
Age: 59
Discharge: HOME | End: 2024-10-21
Payer: MEDICARE

## 2024-10-20 DIAGNOSIS — R10.84 GENERALIZED ABDOMINAL PAIN: Primary | ICD-10-CM

## 2024-10-20 DIAGNOSIS — K42.9 PERIUMBILICAL HERNIA: ICD-10-CM

## 2024-10-20 LAB
ALBUMIN SERPL-MCNC: 4.2 G/DL (ref 3.4–5)
ALBUMIN/GLOB SERPL: 1.6 {RATIO} (ref 1.1–2.2)
ALP SERPL-CCNC: 87 U/L (ref 40–129)
ALT SERPL-CCNC: 28 U/L (ref 10–40)
ANION GAP SERPL CALCULATED.3IONS-SCNC: 10 MMOL/L (ref 9–17)
AST SERPL-CCNC: 23 U/L (ref 15–37)
BASOPHILS # BLD: 0.07 K/UL
BASOPHILS NFR BLD: 1 % (ref 0–1)
BILIRUB SERPL-MCNC: 0.7 MG/DL (ref 0–1)
BILIRUB UR QL STRIP: NEGATIVE
BUN SERPL-MCNC: 15 MG/DL (ref 7–20)
CALCIUM SERPL-MCNC: 9.1 MG/DL (ref 8.3–10.6)
CHLORIDE SERPL-SCNC: 108 MMOL/L (ref 99–110)
CLARITY UR: CLEAR
CO2 SERPL-SCNC: 24 MMOL/L (ref 21–32)
COLOR UR: YELLOW
COMMENT: ABNORMAL
CREAT SERPL-MCNC: 1 MG/DL (ref 0.8–1.3)
EOSINOPHIL # BLD: 0.86 K/UL
EOSINOPHILS RELATIVE PERCENT: 10 % (ref 0–3)
ERYTHROCYTE [DISTWIDTH] IN BLOOD BY AUTOMATED COUNT: 11.8 % (ref 11.7–14.9)
GFR, ESTIMATED: 76 ML/MIN/1.73M2
GLUCOSE SERPL-MCNC: 101 MG/DL (ref 74–99)
GLUCOSE UR STRIP-MCNC: NEGATIVE MG/DL
HCT VFR BLD AUTO: 45.3 % (ref 42–52)
HGB BLD-MCNC: 15.8 G/DL (ref 13.5–18)
HGB UR QL STRIP.AUTO: NEGATIVE
IMM GRANULOCYTES # BLD AUTO: 0.02 K/UL
IMM GRANULOCYTES NFR BLD: 0 %
KETONES UR STRIP-MCNC: NEGATIVE MG/DL
LACTATE BLDV-SCNC: 1.1 MMOL/L (ref 0.4–2)
LEUKOCYTE ESTERASE UR QL STRIP: NEGATIVE
LIPASE SERPL-CCNC: 110 U/L (ref 13–60)
LYMPHOCYTES NFR BLD: 2.75 K/UL
LYMPHOCYTES RELATIVE PERCENT: 31 % (ref 24–44)
MCH RBC QN AUTO: 31.4 PG (ref 27–31)
MCHC RBC AUTO-ENTMCNC: 34.9 G/DL (ref 32–36)
MCV RBC AUTO: 90.1 FL (ref 78–100)
MONOCYTES NFR BLD: 0.98 K/UL
MONOCYTES NFR BLD: 11 % (ref 0–4)
NEUTROPHILS NFR BLD: 48 % (ref 36–66)
NEUTS SEG NFR BLD: 4.26 K/UL
NITRITE UR QL STRIP: NEGATIVE
PH UR STRIP: 6 [PH] (ref 5–8)
PLATELET, FLUORESCENCE: 203 K/UL (ref 140–440)
PMV BLD AUTO: 10.4 FL (ref 7.5–11.1)
POTASSIUM SERPL-SCNC: 4.1 MMOL/L (ref 3.5–5.1)
PROT SERPL-MCNC: 6.9 G/DL (ref 6.4–8.2)
PROT UR STRIP-MCNC: NEGATIVE MG/DL
RBC # BLD AUTO: 5.03 M/UL (ref 4.6–6.2)
SODIUM SERPL-SCNC: 142 MMOL/L (ref 136–145)
SP GR UR STRIP: >1.03 (ref 1–1.03)
UROBILINOGEN UR STRIP-ACNC: 0.2 EU/DL (ref 0–1)
WBC OTHER # BLD: 8.9 K/UL (ref 4–10.5)

## 2024-10-20 PROCEDURE — 83690 ASSAY OF LIPASE: CPT

## 2024-10-20 PROCEDURE — 6360000002 HC RX W HCPCS: Performed by: PHYSICIAN ASSISTANT

## 2024-10-20 PROCEDURE — 74177 CT ABD & PELVIS W/CONTRAST: CPT

## 2024-10-20 PROCEDURE — 80053 COMPREHEN METABOLIC PANEL: CPT

## 2024-10-20 PROCEDURE — 99285 EMERGENCY DEPT VISIT HI MDM: CPT

## 2024-10-20 PROCEDURE — 36415 COLL VENOUS BLD VENIPUNCTURE: CPT

## 2024-10-20 PROCEDURE — 96375 TX/PRO/DX INJ NEW DRUG ADDON: CPT

## 2024-10-20 PROCEDURE — 81003 URINALYSIS AUTO W/O SCOPE: CPT

## 2024-10-20 PROCEDURE — 85025 COMPLETE CBC W/AUTO DIFF WBC: CPT

## 2024-10-20 PROCEDURE — 6360000004 HC RX CONTRAST MEDICATION: Performed by: PHYSICIAN ASSISTANT

## 2024-10-20 PROCEDURE — 96374 THER/PROPH/DIAG INJ IV PUSH: CPT

## 2024-10-20 PROCEDURE — 83605 ASSAY OF LACTIC ACID: CPT

## 2024-10-20 RX ORDER — IOPAMIDOL 755 MG/ML
75 INJECTION, SOLUTION INTRAVASCULAR
Status: COMPLETED | OUTPATIENT
Start: 2024-10-20 | End: 2024-10-20

## 2024-10-20 RX ORDER — ONDANSETRON 2 MG/ML
4 INJECTION INTRAMUSCULAR; INTRAVENOUS EVERY 30 MIN PRN
Status: DISCONTINUED | OUTPATIENT
Start: 2024-10-20 | End: 2024-10-21 | Stop reason: HOSPADM

## 2024-10-20 RX ORDER — FENTANYL CITRATE 50 UG/ML
25 INJECTION, SOLUTION INTRAMUSCULAR; INTRAVENOUS ONCE
Status: COMPLETED | OUTPATIENT
Start: 2024-10-20 | End: 2024-10-20

## 2024-10-20 RX ORDER — DICYCLOMINE HYDROCHLORIDE 10 MG/1
10 CAPSULE ORAL
Qty: 40 CAPSULE | Refills: 0 | Status: SHIPPED | OUTPATIENT
Start: 2024-10-20 | End: 2024-10-30

## 2024-10-20 RX ADMIN — ONDANSETRON 4 MG: 2 INJECTION INTRAMUSCULAR; INTRAVENOUS at 20:30

## 2024-10-20 RX ADMIN — IOPAMIDOL 75 ML: 755 INJECTION, SOLUTION INTRAVENOUS at 21:57

## 2024-10-20 RX ADMIN — FENTANYL CITRATE 25 MCG: 50 INJECTION, SOLUTION INTRAMUSCULAR; INTRAVENOUS at 20:31

## 2024-10-20 ASSESSMENT — PAIN - FUNCTIONAL ASSESSMENT: PAIN_FUNCTIONAL_ASSESSMENT: 0-10

## 2024-10-20 ASSESSMENT — PAIN DESCRIPTION - LOCATION: LOCATION: ABDOMEN

## 2024-10-20 ASSESSMENT — PAIN SCALES - GENERAL
PAINLEVEL_OUTOF10: 6
PAINLEVEL_OUTOF10: 5

## 2024-10-21 ENCOUNTER — APPOINTMENT (OUTPATIENT)
Dept: CT IMAGING | Age: 59
End: 2024-10-21
Payer: MEDICARE

## 2024-10-21 ENCOUNTER — HOSPITAL ENCOUNTER (EMERGENCY)
Age: 59
Discharge: HOME | End: 2024-10-21
Attending: EMERGENCY MEDICINE
Payer: MEDICARE

## 2024-10-21 VITALS
BODY MASS INDEX: 25.9 KG/M2 | DIASTOLIC BLOOD PRESSURE: 84 MMHG | RESPIRATION RATE: 14 BRPM | OXYGEN SATURATION: 95 % | HEART RATE: 76 BPM | WEIGHT: 165 LBS | HEIGHT: 67 IN | SYSTOLIC BLOOD PRESSURE: 136 MMHG | TEMPERATURE: 98.1 F

## 2024-10-21 VITALS
TEMPERATURE: 97.7 F | SYSTOLIC BLOOD PRESSURE: 133 MMHG | OXYGEN SATURATION: 96 % | HEART RATE: 75 BPM | RESPIRATION RATE: 17 BRPM | DIASTOLIC BLOOD PRESSURE: 91 MMHG

## 2024-10-21 DIAGNOSIS — T14.8XXA ABRASION: ICD-10-CM

## 2024-10-21 DIAGNOSIS — S09.90XA CLOSED HEAD INJURY, INITIAL ENCOUNTER: Primary | ICD-10-CM

## 2024-10-21 PROCEDURE — 70450 CT HEAD/BRAIN W/O DYE: CPT

## 2024-10-21 PROCEDURE — 99284 EMERGENCY DEPT VISIT MOD MDM: CPT

## 2024-10-21 PROCEDURE — 6360000002 HC RX W HCPCS: Performed by: EMERGENCY MEDICINE

## 2024-10-21 PROCEDURE — 90471 IMMUNIZATION ADMIN: CPT | Performed by: EMERGENCY MEDICINE

## 2024-10-21 PROCEDURE — 90715 TDAP VACCINE 7 YRS/> IM: CPT | Performed by: EMERGENCY MEDICINE

## 2024-10-21 PROCEDURE — 6370000000 HC RX 637 (ALT 250 FOR IP): Performed by: EMERGENCY MEDICINE

## 2024-10-21 RX ORDER — ACETAMINOPHEN 500 MG
1000 TABLET ORAL EVERY 6 HOURS PRN
Qty: 30 TABLET | Refills: 0 | Status: SHIPPED | OUTPATIENT
Start: 2024-10-21

## 2024-10-21 RX ORDER — IBUPROFEN 600 MG/1
600 TABLET, FILM COATED ORAL ONCE
Status: COMPLETED | OUTPATIENT
Start: 2024-10-21 | End: 2024-10-21

## 2024-10-21 RX ORDER — BACITRACIN ZINC AND POLYMYXIN B SULFATE 500; 1000 [USP'U]/G; [USP'U]/G
OINTMENT TOPICAL
Qty: 28.3 G | Refills: 1 | Status: SHIPPED | OUTPATIENT
Start: 2024-10-21 | End: 2024-10-28

## 2024-10-21 RX ADMIN — IBUPROFEN 600 MG: 600 TABLET, FILM COATED ORAL at 08:27

## 2024-10-21 RX ADMIN — TETANUS TOXOID, REDUCED DIPHTHERIA TOXOID AND ACELLULAR PERTUSSIS VACCINE, ADSORBED 0.5 ML: 5; 2.5; 8; 8; 2.5 SUSPENSION INTRAMUSCULAR at 08:27

## 2024-10-21 ASSESSMENT — PAIN SCALES - GENERAL
PAINLEVEL_OUTOF10: 3
PAINLEVEL_OUTOF10: 0
PAINLEVEL_OUTOF10: 3
PAINLEVEL_OUTOF10: 5

## 2024-10-21 ASSESSMENT — PAIN DESCRIPTION - ORIENTATION
ORIENTATION: LEFT
ORIENTATION: LEFT

## 2024-10-21 ASSESSMENT — PAIN DESCRIPTION - LOCATION
LOCATION: HEAD;LEG;ARM
LOCATION: ARM;LEG

## 2024-10-21 ASSESSMENT — PAIN - FUNCTIONAL ASSESSMENT: PAIN_FUNCTIONAL_ASSESSMENT: 0-10

## 2024-10-21 NOTE — ED NOTES
Discharge education completed, pt verbalized understanding. Pt left unit in wheelchair with caregiver without incident.

## 2024-10-21 NOTE — ED PROVIDER NOTES
EMERGENCY DEPARTMENT ENCOUNTER        Pt Name: Daniel Swanson  MRN: [id number]  Birthdate 1965  Date of evaluation: 10/20/2024  Provider: TRISHA RANDALL  PCP: Virgil Mckeon MD    MACKENZIE. I have evaluated this patient.        Triage CHIEF COMPLAINT       Chief Complaint   Patient presents with    Hernia     Present less than a year, started having pain yesterday         HISTORY OF PRESENT ILLNESS      Chief Complaint: Abdominal pain, hernia pain    Daniel Swanson is a 59 y.o.  male who presents to the ED for concern of mid abdominal pain around his mid abdomen area.  Patient has pain around his periumbilical hernia.  Patient has history of cerebral palsy, had history of abdominal surgery as a child, has a hernia in the periumbilical area, states has been bothering him today.  He denies any nausea or vomiting, no constipation.  No fevers or chills.  No chest pain no shortness of breath, no back pain, denies urinary symptoms.  No history of other bowel surgeries, no history of incarcerated or strangulated hernias.  No bowel obstruction.  Denies any other symptoms.    Nursing Notes were all reviewed and agreed with or any disagreements were addressed in the HPI.    REVIEW OF SYSTEMS     At least 10 systems reviewed and otherwise acutely negative except as in the [place].     PAST MEDICAL HISTORY     Past Medical History:   Diagnosis Date    Cerebral palsy (HCC)     Depression     Hypertension     Seizures (HCC)        SURGICAL HISTORY     Past Surgical History:   Procedure Laterality Date    ABDOMEN SURGERY      Patient reports that he had surgery for Meckel's diverticulum at age 18 months    EYE SURGERY      UPPER GASTROINTESTINAL ENDOSCOPY N/A 12/03/2019    EGD BIOPSY performed by Maura Marks MD at [facility] ENDOSCOPY       CURRENTMEDICATIONS       Discharge Medication List as of 10/20/2024 11:50 PM        CONTINUE these medications which have NOT CHANGED    Details   acetaminophen (AMINOFEN) 325  has history of cerebral palsy, history as an infant and has had some pain surrounding the hernia at the periumbilical area for some time, worsening today.  No nausea vomiting, no fevers, having bowel movements, no history of bowel obstruction or other significant abnormality.    Abdomen generally soft, hemodynamically stable.    Blood work, lactic acid all within normal limits, urinalysis demonstrating no signs of overt infection, CT scan abdomen pelvis with IV contrast also negative for acute abdominal pathology, feeling better on reevaluation remaining stable, unclear of the exact etiology but low suspicion for acute surgical abnormality or acute pathology.  Will look to discharge with further outpatient management, follow-up with primary care.    History from : Patient    Limitations to history : Cerebral palsy    Patient was given the following medications:  Medications   ondansetron (ZOFRAN) injection 4 mg (4 mg IntraVENous Given 10/20/24 2030)   fentaNYL (SUBLIMAZE) injection 25 mcg (25 mcg IntraVENous Given 10/20/24 2031)   iopamidol (ISOVUE-370) 76 % injection 75 mL (75 mLs IntraVENous Given 10/20/24 2157)       Independent Imaging Interpretation by me: CT imaging of the abdomen pelvis demonstrates no signs of acute intra-abdominal pathology    EKG (if obtained): See supervising physician's note for EKG interpretation     Chronic conditions affecting care: Cerebral palsy, history of periumbilical hernia    Discussion with Other Profesionals : None    Social Determinants : None    Records Reviewed : None    Appropriate for outpatient management      I am the Primary Clinician of Record.        CLINICAL IMPRESSION      1. Generalized abdominal pain    2. Periumbilical hernia          DISPOSITION/PLAN   DISPOSITION Decision To Discharge 10/20/2024 11:48:14 PM  Condition at Disposition: Data Unavailable      PATIENT REFERREDTO:  Virgil Mckeon MD  [address] [facility] [address]  [phone]    Schedule an

## 2024-10-21 NOTE — ED PROVIDER NOTES
Emergency Department Encounter    Patient: Daniel Swanson  MRN: [id number]  : 1965  Date of Evaluation: 10/21/2024  ED Provider:  Vasquez Hays MD    Triage Chief Complaint:   Fall (Hit head, not on blood thinners. No LOC. )    [place]:  Daniel Swanson is a 59 y.o. male that presents for this morning.  Patient stated that he was getting out of.  Out of bed and this knee hit the side of the bed.  Patient then stumbled and hit his face into the adjacent wall.  Patient stated he almost punched a hole into the wall.  No loss of consciousness.  No neck pain.  No paresthesias extremities.    ROS - see HPI, below listed is current ROS at time of my eval:  Review of systems negative except as above.     Past Medical History:   Diagnosis Date    Cerebral palsy (HCC)     Depression     Hypertension     Seizures (HCC)      Past Surgical History:   Procedure Laterality Date    ABDOMEN SURGERY      Patient reports that he had surgery for Meckel's diverticulum at age 18 months    EYE SURGERY      UPPER GASTROINTESTINAL ENDOSCOPY N/A 2019    EGD BIOPSY performed by Maura Marks MD at [facility] ENDOSCOPY     No family history on file.  Social History     Socioeconomic History    Marital status: Single     Spouse name: Not on file    Number of children: Not on file    Years of education: Not on file    Highest education level: Not on file   Occupational History    Not on file   Tobacco Use    Smoking status: Never    Smokeless tobacco: Never   Substance and Sexual Activity    Alcohol use: No    Drug use: No    Sexual activity: Not on file   Other Topics Concern    Not on file   Social History Narrative    Not on file     Social Determinants of Health     Financial Resource Strain: Not on file   Food Insecurity: Not on file   Transportation Needs: Not on file   Physical Activity: Not on file   Stress: Not on file   Social Connections: Not on file   Intimate Partner Violence: Not on file   Housing Stability: Not

## 2024-10-22 ENCOUNTER — OFFICE VISIT (OUTPATIENT)
Dept: SURGERY | Age: 59
End: 2024-10-22
Payer: MEDICARE

## 2024-10-22 VITALS — BODY MASS INDEX: 25.9 KG/M2 | WEIGHT: 165 LBS | HEIGHT: 67 IN | OXYGEN SATURATION: 99 %

## 2024-10-22 DIAGNOSIS — K40.20 NON-RECURRENT BILATERAL INGUINAL HERNIA WITHOUT OBSTRUCTION OR GANGRENE: ICD-10-CM

## 2024-10-22 DIAGNOSIS — R10.33 PERIUMBILICAL ABDOMINAL PAIN: Primary | ICD-10-CM

## 2024-10-22 DIAGNOSIS — K52.9 CHRONIC DIARRHEA: ICD-10-CM

## 2024-10-22 DIAGNOSIS — K42.9 PERIUMBILICAL HERNIA: ICD-10-CM

## 2024-10-22 PROCEDURE — G8484 FLU IMMUNIZE NO ADMIN: HCPCS | Performed by: SURGERY

## 2024-10-22 PROCEDURE — G8419 CALC BMI OUT NRM PARAM NOF/U: HCPCS | Performed by: SURGERY

## 2024-10-22 PROCEDURE — G8427 DOCREV CUR MEDS BY ELIG CLIN: HCPCS | Performed by: SURGERY

## 2024-10-22 PROCEDURE — 99204 OFFICE O/P NEW MOD 45 MIN: CPT | Performed by: SURGERY

## 2024-10-22 PROCEDURE — 3017F COLORECTAL CA SCREEN DOC REV: CPT | Performed by: SURGERY

## 2024-10-22 PROCEDURE — 1036F TOBACCO NON-USER: CPT | Performed by: SURGERY

## 2024-10-22 NOTE — PROGRESS NOTES
GENERAL SURGERY NOTE - Outpatient History & Physical  [facility]    PATIENT: Daniel Swanson 1965, 59 y.o., male   Date: 10/22/2024    MRN: [id number]   Requesting Provider:  ED Referral History Obtained From:  patient, electronic medical record     Reason for Evaluation & Chief Complaint:    Chief Complaint   Patient presents with    Surgical Consult     Abd pain - poss hernia      HISTORY OF PRESENT ILLNESS:    Daniel Swanson is a 59 y.o. male presenting with abdominal pain, concern for a possible hernia.     He was seen in the ED 10/21/24 for abdominal pain. The pain just started recently (Saturday?).   No N/V.   He has intermittent diarrhea, 3-4 BM a day usually right after he eats, but no acute change in his bowel habits. Stools are nonbloody.   No F/C.   He has been urinating a little more frequently, denies burning or hematuria.     He has reduced sensation on the right associated with his cerebral palsy, so isn't sure if he has right-sided abdominal pain or not. The pain is not very focal.     Location: upper abdomen (unsure if on the right or not due to lack of sensation)  Quality, Severity: constant  Pain is described as aching and pressure-like  Timing, Duration: a few days  Context, Modifying Factors: denies  Associated Signs & Symptoms: as above    Workup Includes:   CT with a portion of the bladder extending into a right inguinal hernia. Small left fat containing inguinal hernia. Tiny supraumbilical paramidline fat containing hernia. Small hiatal hernia.   Of Note:    Had open excision of a Meckel diverticulum at 17months   Lap cecectomy and excision of a neck sebaceous cyst with Dr. Rojas in 2020  Colonoscopy 6/27/22 - had polyps removed, rec'd to return in 5 years     Past Medical History:    Past Medical History:   Diagnosis Date    Cerebral palsy (HCC)     Depression     Hypertension     Seizures (HCC)        Past Surgical History:    Past Surgical History:

## 2024-10-30 ENCOUNTER — HOSPITAL ENCOUNTER (OUTPATIENT)
Dept: ULTRASOUND IMAGING | Age: 59
Discharge: HOME | End: 2024-10-30
Attending: SURGERY
Payer: MEDICARE

## 2024-10-30 DIAGNOSIS — R10.33 PERIUMBILICAL ABDOMINAL PAIN: ICD-10-CM

## 2024-10-30 PROBLEM — K42.9 PERIUMBILICAL HERNIA: Status: ACTIVE | Noted: 2024-10-30

## 2024-10-30 PROBLEM — K40.20 BILATERAL INGUINAL HERNIA WITHOUT OBSTRUCTION OR GANGRENE: Status: ACTIVE | Noted: 2024-10-30

## 2024-10-30 PROCEDURE — 76705 ECHO EXAM OF ABDOMEN: CPT

## 2024-10-30 ASSESSMENT — ENCOUNTER SYMPTOMS
ABDOMINAL PAIN: 1
CHEST TIGHTNESS: 0
DIARRHEA: 1
NAUSEA: 0
COLOR CHANGE: 0
CONSTIPATION: 0
SHORTNESS OF BREATH: 0
EYE REDNESS: 0
SORE THROAT: 0
VOMITING: 0
EYE DISCHARGE: 0
ABDOMINAL DISTENTION: 0

## 2024-11-19 ENCOUNTER — OFFICE VISIT (OUTPATIENT)
Dept: SURGERY | Age: 59
End: 2024-11-19
Payer: MEDICARE

## 2024-11-19 VITALS
OXYGEN SATURATION: 96 % | SYSTOLIC BLOOD PRESSURE: 142 MMHG | DIASTOLIC BLOOD PRESSURE: 86 MMHG | BODY MASS INDEX: 25.81 KG/M2 | WEIGHT: 164.8 LBS | HEART RATE: 72 BPM

## 2024-11-19 DIAGNOSIS — K80.20 CALCULUS OF GALLBLADDER WITHOUT CHOLECYSTITIS WITHOUT OBSTRUCTION: ICD-10-CM

## 2024-11-19 DIAGNOSIS — K52.9 CHRONIC DIARRHEA: ICD-10-CM

## 2024-11-19 DIAGNOSIS — R10.33 PERIUMBILICAL ABDOMINAL PAIN: Primary | ICD-10-CM

## 2024-11-19 DIAGNOSIS — K42.9 PERIUMBILICAL HERNIA: ICD-10-CM

## 2024-11-19 DIAGNOSIS — K40.20 NON-RECURRENT BILATERAL INGUINAL HERNIA WITHOUT OBSTRUCTION OR GANGRENE: ICD-10-CM

## 2024-11-19 PROCEDURE — 3017F COLORECTAL CA SCREEN DOC REV: CPT | Performed by: SURGERY

## 2024-11-19 PROCEDURE — G8484 FLU IMMUNIZE NO ADMIN: HCPCS | Performed by: SURGERY

## 2024-11-19 PROCEDURE — 1036F TOBACCO NON-USER: CPT | Performed by: SURGERY

## 2024-11-19 PROCEDURE — G8427 DOCREV CUR MEDS BY ELIG CLIN: HCPCS | Performed by: SURGERY

## 2024-11-19 PROCEDURE — 3079F DIAST BP 80-89 MM HG: CPT | Performed by: SURGERY

## 2024-11-19 PROCEDURE — 3077F SYST BP >= 140 MM HG: CPT | Performed by: SURGERY

## 2024-11-19 PROCEDURE — G8419 CALC BMI OUT NRM PARAM NOF/U: HCPCS | Performed by: SURGERY

## 2024-11-19 PROCEDURE — 36415 COLL VENOUS BLD VENIPUNCTURE: CPT | Performed by: SURGERY

## 2024-11-19 PROCEDURE — 99213 OFFICE O/P EST LOW 20 MIN: CPT | Performed by: SURGERY

## 2024-11-19 ASSESSMENT — ENCOUNTER SYMPTOMS
COLOR CHANGE: 0
SORE THROAT: 0
EYE DISCHARGE: 0
EYE REDNESS: 0
CONSTIPATION: 0
DIARRHEA: 1
VOMITING: 0
CHEST TIGHTNESS: 0
ABDOMINAL DISTENTION: 0
SHORTNESS OF BREATH: 0
NAUSEA: 0
ABDOMINAL PAIN: 1

## 2024-11-19 NOTE — PROGRESS NOTES
Acute respiratory failure with hypoxemia 04/21/2022    Calculus of gallbladder without cholecystitis without obstruction 11/20/2024    Bilateral inguinal hernia without obstruction or gangrene 10/30/2024    Periumbilical hernia 10/30/2024    Cecal polyp 01/16/2020    Sebaceous cyst 01/16/2020    Acute upper GI bleed 12/01/2019    Pneumonia of both lungs due to infectious organism     Cerebral palsy, hemiplegic (HCC) 03/10/2016    Hypoxia 03/10/2016    Seizure disorder (HCC) 03/10/2016    Gait disturbance 03/10/2016    Pneumonia 03/08/2016     PLAN:  Discussed findings and options with Daniel Swanson.   His abdominal pain doesn't appear to be associated with the location of any of his hernias, all of which are reducible. Discussed further workup for his pain prior to consideration for hernia repairs. Likely will want to proceed with inguinal hernia repairs as he has the dome of the bladder in the right hernia.    Upper abdominal pain, can't determine if he has right sided pain or not due to lack of sensation. Abdominal US showed cholelithiasis. LFTs were normal on 10/20/24. Lipase was 110, recheck. Could have a component of pancreatitis.    Chronic diarrhea/loose stools - he follows with Dr. Marks, recommended follow up. Has an EGD scheduled soon.   Will plan to check labs and follow up after his endoscopy to review results.   Follow Up: Return in about 2 weeks (around 12/3/2024) for re-check, test results (after testing is complete).    Orders Placed This Encounter   Procedures    Lipase    Comprehensive Metabolic Panel    CBC with Auto Differential        No orders of the defined types were placed in this encounter.         Electronically signed by MARK ALAN MD, 11/20/2024, 4:53 PM

## 2024-11-20 PROBLEM — K80.20 CALCULUS OF GALLBLADDER WITHOUT CHOLECYSTITIS WITHOUT OBSTRUCTION: Status: ACTIVE | Noted: 2024-11-20

## 2024-11-20 LAB
ALBUMIN SERPL-MCNC: 4.3 G/DL (ref 3.4–5)
ALBUMIN/GLOB SERPL: 1.8 {RATIO} (ref 1.1–2.2)
ALP SERPL-CCNC: 87 U/L (ref 40–129)
ALT SERPL-CCNC: 25 U/L (ref 10–40)
ANION GAP SERPL CALCULATED.3IONS-SCNC: 13 MMOL/L (ref 3–16)
AST SERPL-CCNC: 24 U/L (ref 15–37)
BASOPHILS # BLD: 0.1 K/UL (ref 0–0.2)
BASOPHILS NFR BLD: 0.9 %
BILIRUB SERPL-MCNC: 0.5 MG/DL (ref 0–1)
BUN SERPL-MCNC: 19 MG/DL (ref 7–20)
CALCIUM SERPL-MCNC: 9.1 MG/DL (ref 8.3–10.6)
CHLORIDE SERPL-SCNC: 106 MMOL/L (ref 99–110)
CO2 SERPL-SCNC: 22 MMOL/L (ref 21–32)
CREAT SERPL-MCNC: 1 MG/DL (ref 0.8–1.3)
DEPRECATED RDW RBC AUTO: 12.5 % (ref 12.4–15.4)
EOSINOPHIL # BLD: 0.9 K/UL (ref 0–0.6)
EOSINOPHIL NFR BLD: 11.7 %
GFR SERPLBLD CREATININE-BSD FMLA CKD-EPI: 86 ML/MIN/{1.73_M2}
GLUCOSE SERPL-MCNC: 84 MG/DL (ref 70–99)
HCT VFR BLD AUTO: 46.5 % (ref 40.5–52.5)
HGB BLD-MCNC: 16.3 G/DL (ref 13.5–17.5)
LIPASE SERPL-CCNC: 73 U/L (ref 13–60)
LYMPHOCYTES # BLD: 2.4 K/UL (ref 1–5.1)
LYMPHOCYTES NFR BLD: 29.9 %
MCH RBC QN AUTO: 32 PG (ref 26–34)
MCHC RBC AUTO-ENTMCNC: 35 G/DL (ref 31–36)
MCV RBC AUTO: 91.3 FL (ref 80–100)
MONOCYTES # BLD: 0.9 K/UL (ref 0–1.3)
MONOCYTES NFR BLD: 10.6 %
NEUTROPHILS # BLD: 3.8 K/UL (ref 1.7–7.7)
NEUTROPHILS NFR BLD: 46.9 %
PLATELET # BLD AUTO: 150 K/UL (ref 135–450)
PMV BLD AUTO: 10.1 FL (ref 5–10.5)
POTASSIUM SERPL-SCNC: 4.4 MMOL/L (ref 3.5–5.1)
PROT SERPL-MCNC: 6.7 G/DL (ref 6.4–8.2)
RBC # BLD AUTO: 5.09 M/UL (ref 4.2–5.9)
SODIUM SERPL-SCNC: 141 MMOL/L (ref 136–145)
WBC # BLD AUTO: 8.1 K/UL (ref 4–11)

## 2024-12-25 ENCOUNTER — APPOINTMENT (OUTPATIENT)
Dept: GENERAL RADIOLOGY | Age: 59
End: 2024-12-25
Payer: MEDICARE

## 2024-12-25 ENCOUNTER — HOSPITAL ENCOUNTER (EMERGENCY)
Age: 59
Discharge: HOME | End: 2024-12-25
Attending: EMERGENCY MEDICINE
Payer: MEDICARE

## 2024-12-25 VITALS
SYSTOLIC BLOOD PRESSURE: 163 MMHG | HEART RATE: 87 BPM | OXYGEN SATURATION: 94 % | DIASTOLIC BLOOD PRESSURE: 103 MMHG | BODY MASS INDEX: 25.74 KG/M2 | RESPIRATION RATE: 23 BRPM | HEIGHT: 67 IN | WEIGHT: 164 LBS | TEMPERATURE: 97.6 F
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
## 2024-12-25 PROCEDURE — 73502 X-RAY EXAM HIP UNI 2-3 VIEWS: CPT

## 2024-12-25 PROCEDURE — 72100 X-RAY EXAM L-S SPINE 2/3 VWS: CPT

## 2024-12-25 PROCEDURE — 99283 EMERGENCY DEPT VISIT LOW MDM: CPT

## 2024-12-25 ASSESSMENT — PAIN SCALES - GENERAL: PAINLEVEL_OUTOF10: 6

## 2024-12-25 ASSESSMENT — LIFESTYLE VARIABLES
HOW MANY STANDARD DRINKS CONTAINING ALCOHOL DO YOU HAVE ON A TYPICAL DAY: PATIENT DOES NOT DRINK
HOW OFTEN DO YOU HAVE A DRINK CONTAINING ALCOHOL: NEVER

## 2024-12-25 ASSESSMENT — PAIN DESCRIPTION - LOCATION: LOCATION: BACK;HIP

## 2024-12-25 ASSESSMENT — PAIN - FUNCTIONAL ASSESSMENT: PAIN_FUNCTIONAL_ASSESSMENT: 0-10

## 2024-12-25 ASSESSMENT — PAIN DESCRIPTION - ORIENTATION: ORIENTATION: RIGHT;LOWER

## 2024-12-25 ASSESSMENT — PAIN DESCRIPTION - DESCRIPTORS: DESCRIPTORS: ACHING

## 2024-12-25 NOTE — ED PROVIDER NOTES
Emergency Department Encounter    Patient: Daniel Swanson  MRN: [id number]  : 1965  Date of Evaluation: 2024  ED Provider:  Ghulam Estrada MD    Triage Chief Complaint:   Fall, Back Pain, and Hip Pain (Slipped in shower)    "[employer]:  Daniel Swanson is a 59 y.o. male with Bell's palsy, seizure, hypertension and that presents to emergency department via ambulance status post fall in the shower earlier this morning.  Patient states he is having some right-sided hip pain and low back pain.  No loss of consciousness reported.  Patient denies headache, back, chest, neck or abdominal pains.  He denies pain in the other extremities he does not admit to visual changes.  Patient denies shortness of breath.    ROS - see HPI, below listed is current ROS at time of my eval:  General:  No fevers, no chills, no weakness  Eyes:  No recent vison changes, no discharge  ENT: No facial and neck pain  Cardiovascular:  No chest pain, no palpitations  Respiratory:  No shortness of breath, no cough, no wheezing  Gastrointestinal:  No pain, no nausea, no vomiting, no diarrhea  Musculoskeletal: Lower back pain  Skin:  No rash, no pruritis, no easy bruising  Neurologic:  No speech problems, no headache, no extremity numbness, no extremity tingling, no extremity weakness  Psychiatric:  No anxiety  Extremities: Right hip pain    Past Medical History:   Diagnosis Date    Cerebral palsy (HCC)     Depression     Hypertension     Seizures (HCC)      Past Surgical History:   Procedure Laterality Date    ABDOMEN SURGERY      Patient reports that he had surgery for Meckel's diverticulum at age 18 months    EYE SURGERY      UPPER GASTROINTESTINAL ENDOSCOPY N/A 2019    EGD BIOPSY performed by Maura Marks MD at [facility] ENDOSCOPY     History reviewed. No pertinent family history.  Social History     Socioeconomic History    Marital status: Single     Spouse name: Not on file    Number of children: Not on file    Years of

## 2024-12-25 NOTE — ED TRIAGE NOTES
Patient arrived to ED via EMS. EMS states patient fell in shower and is now c/o lower back pain and right hip pain. EMS states patient lives at a group home. EMS states patient has cerebral palsy and hypertension. BP in route was 199/100. Patient A&O x4. Resp even and unlabored. Patient denies LOC. Patient not on blood thinners.

## 2024-12-25 NOTE — DISCHARGE INSTRUCTIONS
You may take Tylenol 1000 mg 2-3 times a day as needed for pain  You may apply ice in the lower back today/tonight if you are having pain

## 2025-01-20 ENCOUNTER — OFFICE VISIT (OUTPATIENT)
Dept: SURGERY | Age: 60
End: 2025-01-20
Payer: MEDICARE

## 2025-01-20 VITALS
SYSTOLIC BLOOD PRESSURE: 122 MMHG | DIASTOLIC BLOOD PRESSURE: 80 MMHG | OXYGEN SATURATION: 94 % | BODY MASS INDEX: 25.69 KG/M2 | HEIGHT: 67 IN | HEART RATE: 88 BPM

## 2025-01-20 DIAGNOSIS — K40.20 NON-RECURRENT BILATERAL INGUINAL HERNIA WITHOUT OBSTRUCTION OR GANGRENE: ICD-10-CM

## 2025-01-20 DIAGNOSIS — K52.9 CHRONIC DIARRHEA: ICD-10-CM

## 2025-01-20 DIAGNOSIS — R10.33 PERIUMBILICAL ABDOMINAL PAIN: Primary | ICD-10-CM

## 2025-01-20 PROCEDURE — 1036F TOBACCO NON-USER: CPT | Performed by: NURSE PRACTITIONER

## 2025-01-20 PROCEDURE — 99213 OFFICE O/P EST LOW 20 MIN: CPT | Performed by: NURSE PRACTITIONER

## 2025-01-20 PROCEDURE — 3074F SYST BP LT 130 MM HG: CPT | Performed by: NURSE PRACTITIONER

## 2025-01-20 PROCEDURE — G8427 DOCREV CUR MEDS BY ELIG CLIN: HCPCS | Performed by: NURSE PRACTITIONER

## 2025-01-20 PROCEDURE — 3079F DIAST BP 80-89 MM HG: CPT | Performed by: NURSE PRACTITIONER

## 2025-01-20 PROCEDURE — G8419 CALC BMI OUT NRM PARAM NOF/U: HCPCS | Performed by: NURSE PRACTITIONER

## 2025-01-20 PROCEDURE — 3017F COLORECTAL CA SCREEN DOC REV: CPT | Performed by: NURSE PRACTITIONER

## 2025-01-20 ASSESSMENT — ENCOUNTER SYMPTOMS
ABDOMINAL DISTENTION: 0
CONSTIPATION: 0
CHEST TIGHTNESS: 0
EYE REDNESS: 0
SORE THROAT: 0
DIARRHEA: 1
SHORTNESS OF BREATH: 0
NAUSEA: 0
VOMITING: 0
ABDOMINAL PAIN: 1
COLOR CHANGE: 0
EYE DISCHARGE: 0

## 2025-01-20 NOTE — PROGRESS NOTES
GENERAL SURGERY NOTE - Outpatient Progress  [facility]    PATIENT: Daniel Swanson 1965, 59 y.o., male   Date: 1/20/2025    MRN: [id number]   Requesting Provider:  ED Referral History Obtained From:  patient, electronic medical record     Reason for Evaluation & Chief Complaint:    Chief Complaint   Patient presents with    Follow-up     2 month F/U Discuss Surgery  Lab Results     HISTORY OF PRESENT ILLNESS:    Daniel Swanson is a 59 y.o. male presenting in follow up with abdominal pain.  He was scheduled to see Dr. Marks for possible EGD.  He denies nausea, vomiting, abdominal pain, constipation, diarrhea.  Having regular BMs.  He states he has not had any issues since his last visit    RUQ US showed:   IMPRESSION:   Cholelithiasis without evidence of cholecystitis.    From Previously:   He was seen in the ED 10/21/24 for abdominal pain. The pain just started recently (Saturday?).   No N/V.   He has intermittent diarrhea, 3-4 BM a day usually right after he eats, but no acute change in his bowel habits. Stools are nonbloody.   No F/C.   He has been urinating a little more frequently, denies burning or hematuria.     He has reduced sensation on the right associated with his cerebral palsy, so isn't sure if he has right-sided abdominal pain or not. The pain is not very focal.     Location: upper abdomen (unsure if on the right or not due to lack of sensation)  Quality, Severity: constant  Pain is described as aching and pressure-like  Timing, Duration: a few days  Context, Modifying Factors: denies  Associated Signs & Symptoms: as above    Workup Includes:   CT with a portion of the bladder extending into a right inguinal hernia. Small left fat containing inguinal hernia. Tiny supraumbilical paramidline fat containing hernia. Small hiatal hernia.   Of Note:    Had open excision of a Meckel diverticulum at 17months   Lap cecectomy and excision of a neck sebaceous cyst with Dr. Rojas in

## (undated) DEVICE — FORCEPS BX L240CM JAW DIA2.8MM L CAP W/ NDL MIC MESH TOOTH

## (undated) DEVICE — Z DISCONTINUED (USE MFG CAT MVABO)  TUBING GAS SAMPLING STD 6.5 FT FEMALE CONN SMRT CAPNOLINE